# Patient Record
Sex: MALE | Race: WHITE | Employment: OTHER | URBAN - METROPOLITAN AREA
[De-identification: names, ages, dates, MRNs, and addresses within clinical notes are randomized per-mention and may not be internally consistent; named-entity substitution may affect disease eponyms.]

---

## 2018-01-15 PROBLEM — I47.9 PAROXYSMAL TACHYCARDIA (HCC): Status: ACTIVE | Noted: 2018-01-15

## 2018-01-15 PROBLEM — I10 ESSENTIAL HYPERTENSION WITH GOAL BLOOD PRESSURE LESS THAN 130/85: Status: ACTIVE | Noted: 2018-01-15

## 2018-01-15 PROBLEM — Z82.49 FAMILY HISTORY OF CORONARY ARTERIOSCLEROSIS: Status: ACTIVE | Noted: 2018-01-15

## 2018-01-15 PROBLEM — R00.2 PALPITATIONS: Status: ACTIVE | Noted: 2018-01-15

## 2018-01-15 PROBLEM — Z76.89 ENCOUNTER TO ESTABLISH CARE: Status: ACTIVE | Noted: 2018-01-15

## 2018-01-22 ENCOUNTER — HOSPITAL ENCOUNTER (OUTPATIENT)
Dept: LAB | Age: 63
Discharge: HOME | End: 2018-01-22
Payer: COMMERCIAL

## 2018-01-22 DIAGNOSIS — R00.2 PALPITATIONS: ICD-10-CM

## 2018-01-22 DIAGNOSIS — Z82.49 FAMILY HISTORY OF CORONARY ARTERIOSCLEROSIS: ICD-10-CM

## 2018-01-22 DIAGNOSIS — Z76.89 ENCOUNTER TO ESTABLISH CARE: ICD-10-CM

## 2018-01-22 DIAGNOSIS — I10 ESSENTIAL HYPERTENSION WITH GOAL BLOOD PRESSURE LESS THAN 130/85: ICD-10-CM

## 2018-01-22 DIAGNOSIS — I47.9 PAROXYSMAL TACHYCARDIA (HCC): ICD-10-CM

## 2018-01-22 LAB
ANION GAP SERPL CALC-SCNC: 6 MMOL/L
BUN SERPL-MCNC: 14 MG/DL (ref 8–23)
CALCIUM SERPL-MCNC: 8.8 MG/DL (ref 8.3–10.4)
CHLORIDE SERPL-SCNC: 104 MMOL/L (ref 98–107)
CHOLEST SERPL-MCNC: 299 MG/DL
CO2 SERPL-SCNC: 29 MMOL/L (ref 21–32)
CREAT SERPL-MCNC: 1 MG/DL (ref 0.8–1.5)
GLUCOSE SERPL-MCNC: 110 MG/DL (ref 65–100)
HDLC SERPL-MCNC: 44 MG/DL (ref 40–60)
HDLC SERPL: 6.8 {RATIO}
LDLC SERPL CALC-MCNC: 206.8 MG/DL
LIPID PROFILE,FLP: ABNORMAL
MAGNESIUM SERPL-MCNC: 1.8 MG/DL (ref 1.8–2.4)
POTASSIUM SERPL-SCNC: 3.8 MMOL/L (ref 3.5–5.1)
SODIUM SERPL-SCNC: 139 MMOL/L (ref 136–145)
TRIGL SERPL-MCNC: 241 MG/DL (ref 35–150)
TSH SERPL DL<=0.005 MIU/L-ACNC: 0.8 UIU/ML (ref 0.36–3.74)
VLDLC SERPL CALC-MCNC: 48.2 MG/DL (ref 6–23)

## 2018-01-22 PROCEDURE — 83735 ASSAY OF MAGNESIUM: CPT | Performed by: INTERNAL MEDICINE

## 2018-01-22 PROCEDURE — 36415 COLL VENOUS BLD VENIPUNCTURE: CPT | Performed by: INTERNAL MEDICINE

## 2018-01-22 PROCEDURE — 84443 ASSAY THYROID STIM HORMONE: CPT | Performed by: INTERNAL MEDICINE

## 2018-01-22 PROCEDURE — 80048 BASIC METABOLIC PNL TOTAL CA: CPT | Performed by: INTERNAL MEDICINE

## 2018-01-22 PROCEDURE — 80061 LIPID PANEL: CPT | Performed by: INTERNAL MEDICINE

## 2018-02-01 PROBLEM — E78.2 MIXED HYPERLIPIDEMIA: Status: ACTIVE | Noted: 2018-02-01

## 2018-02-01 PROBLEM — I49.1 PAC (PREMATURE ATRIAL CONTRACTION): Status: ACTIVE | Noted: 2018-02-01

## 2018-02-13 ENCOUNTER — HOSPITAL ENCOUNTER (OUTPATIENT)
Dept: PHYSICAL THERAPY | Age: 63
Discharge: HOME | End: 2018-02-13
Payer: COMMERCIAL

## 2018-02-13 PROCEDURE — 97161 PT EVAL LOW COMPLEX 20 MIN: CPT

## 2018-02-13 PROCEDURE — 97110 THERAPEUTIC EXERCISES: CPT

## 2018-02-13 NOTE — THERAPY EVALUATION
Mikhail Hylton  : 1955  Primary: [facility]  Secondary:  [facility] [place] [address], [place], [address]  [phone]   [phone]       OUTPATIENT PHYSICAL THERAPY:Initial Assessment 2018    ICD-10: Treatment Diagnosis: Pain in right shoulder (M25.511)  Treatment diagnosis 2: Impingement syndrome, right shoulder (M75.41)  Treatment diagnosis 3: Calcific tendinitis, right shoulder (M75.31)  Precautions: None  Allergies: Review of patient's allergies indicates no known allergies. Fall Risk Score: 1 (? 5 = High Risk)  MD Orders: evaluate and treat  MEDICAL/REFERRING DIAGNOSIS:  Impingement syndrome of right shoulder [M75.41]  Calcific tendinitis of right shoulder [M75.31]   DATE OF ONSET: Increasing pain for months  REFERRING PHYSICIAN: Carla Bess MD  RETURN PHYSICIAN APPOINTMENT: 2018     INITIAL ASSESSMENT:  Mr. Samuel Sanchez is a 57 y.o. male presenting to physical therapy with complaints of pain in right shoulder for the past few months. Patient is a full time  but has decreased work volume due to pain in right shoulder. Pain is increased with overhead work and minimal when upper extremity is below shoulder level. Pain is primarily in anterior shoulder and radiates to medial deltoid. Pain is occasionally in posterior shoulder muscles. Patient reports recent imaging that indicates calcific tendinitis in right shoulder. Patient reports no previous injuries or surgeries. Patient speaks very little [place] and requires a polish . Patient is a good candidate for skilled physical therapy services to include manual therapy, therapeutic exercise, and pain modalities as needed. PROBLEM LIST (Impacting functional limitations):  1. Decreased ADL/Functional Activities  2. Increased Pain  3. Decreased Activity Tolerance  4.  Decreased Flexibility/Joint Mobility INTERVENTIONS PLANNED:  1. Cold  2. Cryotherapy  3. Electrical Stimulation  4. Heat  5. Home Exercise Program (HEP)  6. Manual Therapy  7. Neuromuscular Re-education/Strengthening  8. Range of Motion (ROM)  9. Therapeutic Activites  10. Therapeutic Exercise/Strengthening  11. Transcutaneous Electrical Nerve Stimulation (TENS)  12. Ultrasound (US)   TREATMENT PLAN:  Effective Dates: 2/13/18 TO 3/13/18. Frequency/Duration: 2 times a week for 4 weeks  GOALS: (Goals have been discussed and agreed upon with patient.)  Short-Term Functional Goals: Time Frame: 2/13/18 to 2/27/18  1. Patient will be independent with home program to increase shoulder mobility. 2. Patient will report no more than 5/10 pain in right shoulder with all upper extremity work. Discharge Goals: Time Frame: 2/13/18 to 3/13/18  1. Patient will report no more than 2/10 pain in right shoulder with all overhead work and prolonged painting. 2. Patient will increase shoulder internal rotation bilaterally to 55 degrees to increase mobility. 3. Patient will increase right shoulder abduction to 170 degrees to increase functional mobility. 4. Patient will perform full active elevation of right shoulder/upper extremity with 0/10 pain. 5. Patient will improve Disability of the arm, shoulder, and hand score to 18/55 from 31/55. Rehabilitation Potential For Stated Goals: Good  Regarding [facility], I certify that the treatment plan above will be carried out by a therapist or under their direction. Thank you for this referral,  Justina Joseph PT     Referring Physician Signature: Liliana Granger MD              Date                    The information in this section was collected on 2/13/18 (except where otherwise noted). HISTORY:   History of Present Injury/Illness (Reason for Referral):  Mr. Zahra Real is a 57 y.o. male presenting to physical therapy with complaints of pain in right shoulder for the past few months.   Patient is a full time  but has decreased work volume due to pain in right shoulder. Pain is increased with overhead work and minimal when upper extremity is below shoulder level. Pain is primarily in anterior shoulder and radiates to medial deltoid. Pain is occasionally in posterior shoulder muscles. Patient reports recent imaging that indicates calcific tendinitis in right shoulder. Patient reports no previous injuries or surgeries. Patient speaks very little [place] and requires a polish . Past Medical History/Comorbidities:   Mr. He Ibarra  has no past medical history on file. Mr. He Ibarra  has no past surgical history on file. Social History/Living Environment:    Patient lives at home. Prior Level of Function/Work/Activity:  Full time , self employed. Dominant Side:         RIGHT  Current Medications:       Current Outpatient Prescriptions:     amLODIPine-benazepril (LOTREL) 2.5-10 mg per capsule, Take 1 Cap by mouth daily. , Disp: 90 Cap, Rfl: 3   Date Last Reviewed:  2/13/2018   Number of Personal Factors/Comorbidities that affect the Plan of Care:  (None) 0: LOW COMPLEXITY   EXAMINATION:   Observation/Orthostatic Postural Assessment:          Patient presents with mild rounded shoulders posture. Palpation:          Tender at anterior shoulder (biceps, long head tendon at bicipital groove)  ROM:            Shoulder AROM: (degrees)  Flexion: left=180, right=165 with pain  Abduction: left=180, right=150 with pain  External rotation: left=85, right=80  Internal rotation: left=40, right=40    Strength:            Gross strength in bilateral shoulders and upper extremities is 5/5. Pain only with resisted shoulder flexion (right side).    : left=65 lbs, right=63 lbs    Special Tests:            Giron Syed=negative  Speed's test=positive  Empty can=negative  Hornblower's=negative  Painful arc=positive    Neurological Screen:        Myotomes:  Bilateral upper extremities are normal Dermatomes:  Intact for light touch and sensation    Functional Mobility:           Standing shoulder flexion: left=175, right=175 with pain  Standing shoulder abduction: gnxs=793, right=175 with pain     Body Structures Involved:  1. Bones  2. Joints  3. Muscles Body Functions Affected:  1. Sensory/Pain  2. Movement Related Activities and Participation Affected:  1. General Tasks and Demands  2. Mobility  3. Domestic Life  4. Community, Social and [place]   Number of elements (examined above) that affect the Plan of Care: 1-2: LOW COMPLEXITY   CLINICAL PRESENTATION:   Presentation: Stable and uncomplicated: LOW COMPLEXITY   CLINICAL DECISION MAKING:   Outcome Measure: Tool Used: Disabilities of the Arm, Shoulder and Hand (DASH) Questionnaire - Quick Version  Score:  Initial: 31/55  Most Recent: X/55 (Date: -- )   Interpretation of Score: The DASH is designed to measure the activities of daily living in person's with upper extremity dysfunction or pain. Each section is scored on a 1-5 scale, 5 representing the greatest disability. The scores of each section are added together for a total score of 55. Score 11 12-19 20-28 29-37 38-45 46-54 55   Modifier CH CI CJ CK CL CM CN       Medical Necessity:   · Patient is expected to demonstrate progress in strength, range of motion, coordination and functional technique to decrease pain and increase tolerance for overhead activities and work duties. · Skilled intervention continues to be required due to pain and limitation in right shoulder. Reason for Services/Other Comments:  · Patient continues to require skilled intervention due to pain and limitation in right shoulder.    Use of outcome tool(s) and clinical judgement create a POC that gives a:  (no co-morbidities, moderate pain, moderate limitation on outcome measure) Clear prediction of patient's progress: LOW COMPLEXITY            TREATMENT:   (In addition to Assessment/Re-Assessment sessions the following treatments were rendered)  Pre-treatment Symptoms/Complaints:  Patient reports increased pain with overhead work (painting) and minimal pain when below shoulder level. Pain: Initial:   Pain Intensity 1: 4  Pain Location 1: Shoulder  Pain Orientation 1: Right  Post Session:  Patient reports 2/10 pain     Therapeutic Exercise: (25 Minutes):  Exercises per grid below to improve mobility, strength, coordination and dynamic movement of shoulder - right to improve functional lifting, carrying, reaching and overhead activites. Required minimal visual, verbal and manual cues to promote proper body alignment, promote proper body posture and promote proper body mechanics. Progressed resistance, range, repetitions and complexity of movement as indicated. Date:  2/13/18 Date:   Date:     Activity/Exercise Parameters Parameters Parameters   Wand flexion [id number], supine     Wand abduction [id number], standing     Functional IR stretch [id number], right, with towel     Prone T 1x10, 5 sec holds     Prone shoulder extension 1x10, 5 sec holds                   Manual Therapy (     ): None today    Therapeutic Modalities: for pain and edema                  Right Shoulder Cold  Type: Cold/ice pack  Duration : 10 minutes  Patient Position: Sitting                                                                            HEP: As above; handouts given to patient for all exercises. Treatment/Session Assessment:    · Response to Treatment:  Patient reported decreased pain following cold treatment and reported no issues with stretches/exercises. Patient showed good understanding of home program.  Patient advised to limit overhead work and heavy lifting, and ice shoulder 2 times per day. · Compliance with Program/Exercises: compliant most of the time. · Recommendations/Intent for next treatment session: \"Next visit will focus on advancements to more challenging activities\".     Total Treatment Duration: 55 minutes; 20 minute evaluation, 25 minutes therapeutic exercise, 10 minute cold pack    PT Patient Time In/Time Out  Time In: 0905  Time Out: [address], PT

## 2018-02-13 NOTE — PROGRESS NOTES
Ambulatory/Rehab Services H2 Model Falls Risk Assessment    Risk Factor Pts. ·   Confusion/Disorientation/Impulsivity  []    4 ·   Symptomatic Depression  []   2 ·   Altered Elimination  []   1 ·   Dizziness/Vertigo  []   1 ·   Gender (Male)  [x]   1 ·   Any administered antiepileptics (anticonvulsants):  []   2 ·   Any administered benzodiazepines:  []   1 ·   Visual Impairment (specify):  []   1 ·   Portable Oxygen Use  []   1 ·   Orthostatic ? BP  []   1 ·   History of Recent Falls (within 3 mos.)  []   5     Ability to Rise from Chair (choose one) Pts. ·   Ability to rise in a single movement  [x]   0 ·   Pushes up, successful in one attempt  []   1 ·   Multiple attempts, but successful  []   3 ·   Unable to rise without assistance  []   4   Total: (5 or greater = High Risk) 1     Falls Prevention Plan:   []                Physical Limitations to Exercise (specify):   []                Mobility Assistance Device (type):   []                Exercise/Equipment Adaptation (specify):    ©2010 [facility] [address] Patent #[id number].  Federal Law prohibits the replication, distribution or use without written permission from [facility] [employer]

## 2018-02-20 ENCOUNTER — HOSPITAL ENCOUNTER (OUTPATIENT)
Dept: PHYSICAL THERAPY | Age: 63
Discharge: HOME | End: 2018-02-20
Payer: COMMERCIAL

## 2018-02-20 PROCEDURE — 97110 THERAPEUTIC EXERCISES: CPT

## 2018-02-20 PROCEDURE — 97035 APP MDLTY 1+ULTRASOUND EA 15: CPT

## 2018-02-20 NOTE — PROGRESS NOTES
Taylorjuana Wyatt  : 1955  Primary: [place] [facility]  Secondary:  [facility] [place] [address], [place], [address]  [phone]   [phone]       OUTPATIENT PHYSICAL THERAPY:Daily Note 2018    ICD-10: Treatment Diagnosis: Pain in right shoulder (M25.511)  Treatment diagnosis 2: Impingement syndrome, right shoulder (M75.41)  Treatment diagnosis 3: Calcific tendinitis, right shoulder (M75.31)  Precautions: None  Allergies: Review of patient's allergies indicates no known allergies. Fall Risk Score: 1 (? 5 = High Risk)  MD Orders: evaluate and treat  MEDICAL/REFERRING DIAGNOSIS:  Impingement syndrome of right shoulder [M75.41]  Calcific tendinitis of right shoulder [M75.31]   DATE OF ONSET: Increasing pain for months  REFERRING PHYSICIAN: Shasha Hooper MD  RETURN PHYSICIAN APPOINTMENT: 2018     INITIAL ASSESSMENT:  Mr. Benoit Metzger is a 57 y.o. male presenting to physical therapy with complaints of pain in right shoulder for the past few months. Patient is a full time  but has decreased work volume due to pain in right shoulder. Pain is increased with overhead work and minimal when upper extremity is below shoulder level. Pain is primarily in anterior shoulder and radiates to medial deltoid. Pain is occasionally in posterior shoulder muscles. Patient reports recent imaging that indicates calcific tendinitis in right shoulder. Patient reports no previous injuries or surgeries. Patient speaks very little [place] and requires a polish . Patient is a good candidate for skilled physical therapy services to include manual therapy, therapeutic exercise, and pain modalities as needed. PROBLEM LIST (Impacting functional limitations):  1. Decreased ADL/Functional Activities  2. Increased Pain  3. Decreased Activity Tolerance  4.  Decreased Flexibility/Joint Mobility INTERVENTIONS PLANNED:  1. Cold  2. Cryotherapy  3. Electrical Stimulation  4. Heat  5. Home Exercise Program (HEP)  6. Manual Therapy  7. Neuromuscular Re-education/Strengthening  8. Range of Motion (ROM)  9. Therapeutic Activites  10. Therapeutic Exercise/Strengthening  11. Transcutaneous Electrical Nerve Stimulation (TENS)  12. Ultrasound (US)   TREATMENT PLAN:  Effective Dates: 2/13/18 TO 3/13/18. Frequency/Duration: 2 times a week for 4 weeks  GOALS: (Goals have been discussed and agreed upon with patient.)  Short-Term Functional Goals: Time Frame: 2/13/18 to 2/27/18  1. Patient will be independent with home program to increase shoulder mobility. 2. Patient will report no more than 5/10 pain in right shoulder with all upper extremity work. Discharge Goals: Time Frame: 2/13/18 to 3/13/18  1. Patient will report no more than 2/10 pain in right shoulder with all overhead work and prolonged painting. 2. Patient will increase shoulder internal rotation bilaterally to 55 degrees to increase mobility. 3. Patient will increase right shoulder abduction to 170 degrees to increase functional mobility. 4. Patient will perform full active elevation of right shoulder/upper extremity with 0/10 pain. 5. Patient will improve Disability of the arm, shoulder, and hand score to 18/55 from 31/55. Rehabilitation Potential For Stated Goals: Good  [facility] certify that the treatment plan above will be carried out by a therapist or under their direction. Thank you for this referral,  Haven Buck PT     Referring Physician Signature: Donnie Vallejo MD              Date                    The information in this section was collected on 2/13/18 (except where otherwise noted). HISTORY:   History of Present Injury/Illness (Reason for Referral):  Mr. He Ibarra is a 57 y.o. male presenting to physical therapy with complaints of pain in right shoulder for the past few months.   Patient is a full time  but has decreased work volume due to pain in right shoulder. Pain is increased with overhead work and minimal when upper extremity is below shoulder level. Pain is primarily in anterior shoulder and radiates to medial deltoid. Pain is occasionally in posterior shoulder muscles. Patient reports recent imaging that indicates calcific tendinitis in right shoulder. Patient reports no previous injuries or surgeries. Patient speaks very little [place] and requires a polish . Past Medical History/Comorbidities:   Mr. Terell Hernandez  has no past medical history on file. Mr. Terell Hernandez  has no past surgical history on file. Social History/Living Environment:    Patient lives at home. Prior Level of Function/Work/Activity:  Full time , self employed. Dominant Side:         RIGHT  Current Medications:       Current Outpatient Prescriptions:     amLODIPine-benazepril (LOTREL) 2.5-10 mg per capsule, Take 1 Cap by mouth daily. , Disp: 90 Cap, Rfl: 3   Date Last Reviewed:  2/20/2018   Number of Personal Factors/Comorbidities that affect the Plan of Care:  (None) 0: LOW COMPLEXITY   EXAMINATION:   Observation/Orthostatic Postural Assessment:          Patient presents with mild rounded shoulders posture. Palpation:          Tender at anterior shoulder (biceps, long head tendon at bicipital groove)  ROM:            Shoulder AROM: (degrees)  Flexion: left=180, right=165 with pain  Abduction: left=180, right=150 with pain  External rotation: left=85, right=80  Internal rotation: left=40, right=40    Strength:            Gross strength in bilateral shoulders and upper extremities is 5/5. Pain only with resisted shoulder flexion (right side).    : left=65 lbs, right=63 lbs    Special Tests:            Giron Syed=negative  Speed's test=positive  Empty can=negative  Hornblower's=negative  Painful arc=positive    Neurological Screen:        Myotomes:  Bilateral upper extremities are normal Dermatomes:  Intact for light touch and sensation    Functional Mobility:           Standing shoulder flexion: left=175, right=175 with pain  Standing shoulder abduction: gqvy=706, right=175 with pain     Body Structures Involved:  1. Bones  2. Joints  3. Muscles Body Functions Affected:  1. Sensory/Pain  2. Movement Related Activities and Participation Affected:  1. General Tasks and Demands  2. Mobility  3. Domestic Life  4. Community, Social and [place]   Number of elements (examined above) that affect the Plan of Care: 1-2: LOW COMPLEXITY   CLINICAL PRESENTATION:   Presentation: Stable and uncomplicated: LOW COMPLEXITY   CLINICAL DECISION MAKING:   Outcome Measure: Tool Used: Disabilities of the Arm, Shoulder and Hand (DASH) Questionnaire - Quick Version  Score:  Initial: 31/55  Most Recent: X/55 (Date: -- )   Interpretation of Score: The DASH is designed to measure the activities of daily living in person's with upper extremity dysfunction or pain. Each section is scored on a 1-5 scale, 5 representing the greatest disability. The scores of each section are added together for a total score of 55. Score 11 12-19 20-28 29-37 38-45 46-54 55   Modifier CH CI CJ CK CL CM CN       Medical Necessity:   · Patient is expected to demonstrate progress in strength, range of motion, coordination and functional technique to decrease pain and increase tolerance for overhead activities and work duties. · Skilled intervention continues to be required due to pain and limitation in right shoulder. Reason for Services/Other Comments:  · Patient continues to require skilled intervention due to pain and limitation in right shoulder.    Use of outcome tool(s) and clinical judgement create a POC that gives a:  (no co-morbidities, moderate pain, moderate limitation on outcome measure) Clear prediction of patient's progress: LOW COMPLEXITY            TREATMENT:   (In addition to Assessment/Re-Assessment sessions the following treatments were rendered)  Pre-treatment Symptoms/Complaints:  Patient reports moderate shoulder pain today and reports no issues with home program.    Pain: Initial:   Pain Intensity 1: 4  Pain Location 1: Shoulder  Pain Orientation 1: Right, Anterior  Post Session:  Patient reports 2/10 pain     Therapeutic Exercise: (35 Minutes):  Exercises per grid below to improve mobility, strength, coordination and dynamic movement of shoulder - right to improve functional lifting, carrying, reaching and overhead activites. Required minimal visual, verbal and manual cues to promote proper body alignment, promote proper body posture and promote proper body mechanics. Progressed resistance, range, repetitions and complexity of movement as indicated. Date:  2/13/18 Date:  2/20/18 Date:     Activity/Exercise Parameters Parameters Parameters   Wand flexion [id number], supine     Wand abduction [id number], standing     Functional IR stretch [id number], right, with towel     Prone T 1x10, 5 sec holds     Low rows  2x10, green    Cable rows  2x10, 20 lbs    Bilateral ER  2x10, red    Bicep curls  2x10, eccentric only, 8 lbs    Tband punch  2x10, blue    Prone shoulder extension 1x10, 5 sec holds     Doorway stretch, low  [id number]            Manual Therapy (     ): None today    Therapeutic Modalities: for pain and edema                  Right Shoulder Cold  Type: Cold/ice pack  Duration : 10 minutes  Patient Position: Sitting                                            Right Shoulder Ultrasound  Duty Cycle: 50 %  Frequency: 1 mHz  Intensity: 1.5 duran/cm sq  Duration : 10 minutes  Patient Position: Sitting                               HEP: As above; handouts given to patient for all exercises. Treatment/Session Assessment:    · Response to Treatment:  Patient reported no issues or pain with exercises and stretches. Patient reported mild relief following session.   · Compliance with Program/Exercises: compliant most of the time.  · Recommendations/Intent for next treatment session: \"Next visit will focus on advancements to more challenging activities\".     Total Treatment Duration: 55 minutes    PT Patient Time In/Time Out  Time In: 0800  Time Out: Pr-[address] [place] By Caden Richmond PT

## 2018-02-22 ENCOUNTER — HOSPITAL ENCOUNTER (OUTPATIENT)
Dept: PHYSICAL THERAPY | Age: 63
Discharge: HOME | End: 2018-02-22
Payer: COMMERCIAL

## 2018-02-22 PROCEDURE — 97110 THERAPEUTIC EXERCISES: CPT

## 2018-02-22 PROCEDURE — 97140 MANUAL THERAPY 1/> REGIONS: CPT

## 2018-02-22 PROCEDURE — 97035 APP MDLTY 1+ULTRASOUND EA 15: CPT

## 2018-02-22 NOTE — PROGRESS NOTES
Natalya Davey  : 1955  Primary: [facility]  Secondary:  [facility] [place] [facility] [address], [place], [address]  [phone]   [phone]       OUTPATIENT PHYSICAL THERAPY:Daily Note 2018    ICD-10: Treatment Diagnosis: Pain in right shoulder (M25.511)  Treatment diagnosis 2: Impingement syndrome, right shoulder (M75.41)  Treatment diagnosis 3: Calcific tendinitis, right shoulder (M75.31)  Precautions: None  Allergies: Review of patient's allergies indicates no known allergies. Fall Risk Score: 1 (? 5 = High Risk)  MD Orders: evaluate and treat  MEDICAL/REFERRING DIAGNOSIS:  Impingement syndrome of right shoulder [M75.41]  Calcific tendinitis of right shoulder [M75.31]   DATE OF ONSET: Increasing pain for months  REFERRING PHYSICIAN: Lesli Brady MD  RETURN PHYSICIAN APPOINTMENT: 2018     INITIAL ASSESSMENT:  Mr. Sofya Hill is a 57 y.o. male presenting to physical therapy with complaints of pain in right shoulder for the past few months. Patient is a full time  but has decreased work volume due to pain in right shoulder. Pain is increased with overhead work and minimal when upper extremity is below shoulder level. Pain is primarily in anterior shoulder and radiates to medial deltoid. Pain is occasionally in posterior shoulder muscles. Patient reports recent imaging that indicates calcific tendinitis in right shoulder. Patient reports no previous injuries or surgeries. Patient speaks very little [place] and requires a polish . Patient is a good candidate for skilled physical therapy services to include manual therapy, therapeutic exercise, and pain modalities as needed. PROBLEM LIST (Impacting functional limitations):  1. Decreased ADL/Functional Activities  2. Increased Pain  3. Decreased Activity Tolerance  4.  Decreased Flexibility/Joint Mobility INTERVENTIONS PLANNED:  1. Cold  2. Cryotherapy  3. Electrical Stimulation  4. Heat  5. Home Exercise Program (HEP)  6. Manual Therapy  7. Neuromuscular Re-education/Strengthening  8. Range of Motion (ROM)  9. Therapeutic Activites  10. Therapeutic Exercise/Strengthening  11. Transcutaneous Electrical Nerve Stimulation (TENS)  12. Ultrasound (US)   TREATMENT PLAN:  Effective Dates: 2/13/18 TO 3/13/18. Frequency/Duration: 2 times a week for 4 weeks  GOALS: (Goals have been discussed and agreed upon with patient.)  Short-Term Functional Goals: Time Frame: 2/13/18 to 2/27/18  1. Patient will be independent with home program to increase shoulder mobility. 2. Patient will report no more than 5/10 pain in right shoulder with all upper extremity work. Discharge Goals: Time Frame: 2/13/18 to 3/13/18  1. Patient will report no more than 2/10 pain in right shoulder with all overhead work and prolonged painting. 2. Patient will increase shoulder internal rotation bilaterally to 55 degrees to increase mobility. 3. Patient will increase right shoulder abduction to 170 degrees to increase functional mobility. 4. Patient will perform full active elevation of right shoulder/upper extremity with 0/10 pain. 5. Patient will improve Disability of the arm, shoulder, and hand score to 18/55 from 31/55. Rehabilitation Potential For Stated Goals: Good  Regarding [facility], I certify that the treatment plan above will be carried out by a therapist or under their direction. Thank you for this referral,  Haven Buck PT     Referring Physician Signature: Donnie Vallejo MD              Date                    The information in this section was collected on 2/13/18 (except where otherwise noted). HISTORY:   History of Present Injury/Illness (Reason for Referral):  Mr. He Ibarra is a 57 y.o. male presenting to physical therapy with complaints of pain in right shoulder for the past few months.   Patient is a full time  but has decreased work volume due to pain in right shoulder. Pain is increased with overhead work and minimal when upper extremity is below shoulder level. Pain is primarily in anterior shoulder and radiates to medial deltoid. Pain is occasionally in posterior shoulder muscles. Patient reports recent imaging that indicates calcific tendinitis in right shoulder. Patient reports no previous injuries or surgeries. Patient speaks very little [place] and requires a polish . Past Medical History/Comorbidities:   Mr. Te Hummel  has no past medical history on file. Mr. Te Hummel  has no past surgical history on file. Social History/Living Environment:    Patient lives at home. Prior Level of Function/Work/Activity:  Full time , self employed. Dominant Side:         RIGHT  Current Medications:       Current Outpatient Prescriptions:     amLODIPine-benazepril (LOTREL) 2.5-10 mg per capsule, Take 1 Cap by mouth daily. , Disp: 90 Cap, Rfl: 3   Date Last Reviewed:  2/22/2018   Number of Personal Factors/Comorbidities that affect the Plan of Care:  (None) 0: LOW COMPLEXITY   EXAMINATION:   Observation/Orthostatic Postural Assessment:          Patient presents with mild rounded shoulders posture. Palpation:          Tender at anterior shoulder (biceps, long head tendon at bicipital groove)  ROM:            Shoulder AROM: (degrees)  Flexion: left=180, right=165 with pain  Abduction: left=180, right=150 with pain  External rotation: left=85, right=80  Internal rotation: left=40, right=40    Strength:            Gross strength in bilateral shoulders and upper extremities is 5/5. Pain only with resisted shoulder flexion (right side).    : left=65 lbs, right=63 lbs    Special Tests:            Giron Syed=negative  Speed's test=positive  Empty can=negative  Hornblower's=negative  Painful arc=positive    Neurological Screen:        Myotomes:  Bilateral upper extremities are normal Dermatomes:  Intact for light touch and sensation    Functional Mobility:           Standing shoulder flexion: left=175, right=175 with pain  Standing shoulder abduction: vpac=395, right=175 with pain     Body Structures Involved:  1. Bones  2. Joints  3. Muscles Body Functions Affected:  1. Sensory/Pain  2. Movement Related Activities and Participation Affected:  1. General Tasks and Demands  2. Mobility  3. Domestic Life  4. Community, Social and [place]   Number of elements (examined above) that affect the Plan of Care: 1-2: LOW COMPLEXITY   CLINICAL PRESENTATION:   Presentation: Stable and uncomplicated: LOW COMPLEXITY   CLINICAL DECISION MAKING:   Outcome Measure: Tool Used: Disabilities of the Arm, Shoulder and Hand (DASH) Questionnaire - Quick Version  Score:  Initial: 31/55  Most Recent: X/55 (Date: -- )   Interpretation of Score: The DASH is designed to measure the activities of daily living in person's with upper extremity dysfunction or pain. Each section is scored on a 1-5 scale, 5 representing the greatest disability. The scores of each section are added together for a total score of 55. Score 11 12-19 20-28 29-37 38-45 46-54 55   Modifier CH CI CJ CK CL CM CN       Medical Necessity:   · Patient is expected to demonstrate progress in strength, range of motion, coordination and functional technique to decrease pain and increase tolerance for overhead activities and work duties. · Skilled intervention continues to be required due to pain and limitation in right shoulder. Reason for Services/Other Comments:  · Patient continues to require skilled intervention due to pain and limitation in right shoulder.    Use of outcome tool(s) and clinical judgement create a POC that gives a:  (no co-morbidities, moderate pain, moderate limitation on outcome measure) Clear prediction of patient's progress: LOW COMPLEXITY            TREATMENT:   (In addition to Assessment/Re-Assessment sessions the following treatments were rendered)  Pre-treatment Symptoms/Complaints:  Patient reports mild pain today only with movement. Patient reports slight improvement since starting therapy. Pain: Initial:   Pain Intensity 1: 4  Pain Location 1: Shoulder  Pain Orientation 1: Right, Anterior  Post Session:  Patient reports 3/10 pain     Therapeutic Exercise: (35 Minutes):  Exercises per grid below to improve mobility, strength, coordination and dynamic movement of shoulder - right to improve functional lifting, carrying, reaching and overhead activites. Required minimal visual, verbal and manual cues to promote proper body alignment, promote proper body posture and promote proper body mechanics. Progressed resistance, range, repetitions and complexity of movement as indicated. Date:  2/13/18 Date:  2/20/18 Date:  2/22/18   Activity/Exercise Parameters Parameters Parameters   Wand flexion [id number], supine     Wand abduction [id number], standing     Push up plus   1x10, against table   Functional IR stretch [id number], right, with towel     Sidelying ER   1x15, 3 lbs   Prone Y   2x10, 3 lbs   Prone T 1x10, 5 sec holds  2x10, 3 lbs   Low rows  2x10, green    Cable rows  2x10, 20 lbs 2x12, B, 22.5lbs   Bilateral ER  2x10, red    Bicep curls  2x10, eccentric only, 8 lbs 2x10, 10 lbs, eccentric only   Cable punch  2x10, blue 2x10, 10 lbs, R   Prone shoulder extension 1x10, 5 sec holds  2x10, 3 lb   Doorway stretch, low  [id number]            Manual Therapy (    Soft Tissue Mobilization Duration  Duration: 10 Minutes):  Inferior and posterior glides of glenohumeral joint in supine with gentle oscillation and distraction    Therapeutic Modalities: for pain and edema                                                               Right Shoulder Ultrasound  Duty Cycle: 100 %  Frequency: 1 mHz  Intensity: 0.5 duran/cm sq  Duration : 10 minutes  Patient Position: Sitting                               HEP: As above; handouts given to patient for all exercises. Treatment/Session Assessment:    · Response to Treatment:  Patient reported no issues or pain with most exercises and stretches. Patient reports slight pain with prone shoulder Y exercise  · Compliance with Program/Exercises: compliant most of the time. · Recommendations/Intent for next treatment session: \"Next visit will focus on advancements to more challenging activities\".     Total Treatment Duration: 55 minutes    PT Patient Time In/Time Out  Time In: 1230  Time Out: 238 Maria Eugenia Casas, PT

## 2018-02-27 ENCOUNTER — HOSPITAL ENCOUNTER (OUTPATIENT)
Dept: PHYSICAL THERAPY | Age: 63
Discharge: HOME | End: 2018-02-27
Payer: COMMERCIAL

## 2018-02-27 PROCEDURE — 97035 APP MDLTY 1+ULTRASOUND EA 15: CPT

## 2018-02-27 PROCEDURE — 97110 THERAPEUTIC EXERCISES: CPT

## 2018-02-27 PROCEDURE — 97140 MANUAL THERAPY 1/> REGIONS: CPT

## 2018-02-27 NOTE — PROGRESS NOTES
Char Burgess  : 1955  Primary: [facility]  Secondary:  [facility] [place] [address], [place], [address] [place] [address] Street  [phone]   [phone]       OUTPATIENT PHYSICAL THERAPY:Daily Note 2018    ICD-10: Treatment Diagnosis: Pain in right shoulder (M25.511)  Treatment diagnosis 2: Impingement syndrome, right shoulder (M75.41)  Treatment diagnosis 3: Calcific tendinitis, right shoulder (M75.31)  Precautions: None  Allergies: Review of patient's allergies indicates no known allergies. Fall Risk Score: 1 (? 5 = High Risk)  MD Orders: evaluate and treat  MEDICAL/REFERRING DIAGNOSIS:  Impingement syndrome of right shoulder [M75.41]  Calcific tendinitis of right shoulder [M75.31]   DATE OF ONSET: Increasing pain for months  REFERRING PHYSICIAN: Niko Gomez MD  RETURN PHYSICIAN APPOINTMENT: 2018     INITIAL ASSESSMENT:  Mr. Freida Alvarez is a 57 y.o. male presenting to physical therapy with complaints of pain in right shoulder for the past few months. Patient is a full time  but has decreased work volume due to pain in right shoulder. Pain is increased with overhead work and minimal when upper extremity is below shoulder level. Pain is primarily in anterior shoulder and radiates to medial deltoid. Pain is occasionally in posterior shoulder muscles. Patient reports recent imaging that indicates calcific tendinitis in right shoulder. Patient reports no previous injuries or surgeries. Patient speaks very little [place] and requires a polish . Patient is a good candidate for skilled physical therapy services to include manual therapy, therapeutic exercise, and pain modalities as needed. PROBLEM LIST (Impacting functional limitations):  1. Decreased ADL/Functional Activities  2. Increased Pain  3. Decreased Activity Tolerance  4.  Decreased Flexibility/Joint Mobility INTERVENTIONS PLANNED:  1. Cold  2. Cryotherapy  3. Electrical Stimulation  4. Heat  5. Home Exercise Program (HEP)  6. Manual Therapy  7. Neuromuscular Re-education/Strengthening  8. Range of Motion (ROM)  9. Therapeutic Activites  10. Therapeutic Exercise/Strengthening  11. Transcutaneous Electrical Nerve Stimulation (TENS)  12. Ultrasound (US)   TREATMENT PLAN:  Effective Dates: 2/13/18 TO 3/13/18. Frequency/Duration: 2 times a week for 4 weeks  GOALS: (Goals have been discussed and agreed upon with patient.)  Short-Term Functional Goals: Time Frame: 2/13/18 to 2/27/18  1. Patient will be independent with home program to increase shoulder mobility. 2. Patient will report no more than 5/10 pain in right shoulder with all upper extremity work. Discharge Goals: Time Frame: 2/13/18 to 3/13/18  1. Patient will report no more than 2/10 pain in right shoulder with all overhead work and prolonged painting. 2. Patient will increase shoulder internal rotation bilaterally to 55 degrees to increase mobility. 3. Patient will increase right shoulder abduction to 170 degrees to increase functional mobility. 4. Patient will perform full active elevation of right shoulder/upper extremity with 0/10 pain. 5. Patient will improve Disability of the arm, shoulder, and hand score to 18/55 from 31/55. Rehabilitation Potential For Stated Goals: Good  Regarding \[facility]\"" [employer], I certify that the treatment plan above will be carried out by a therapist or under their direction. Thank you for this referral,  Piper De Leon PT     Referring Physician Signature: Nemesio Robertson MD              Date                    The information in this section was collected on 2/13/18 (except where otherwise noted). HISTORY:   History of Present Injury/Illness (Reason for Referral):  Mr. Parvez Bedolla is a 57 y.o. male presenting to physical therapy with complaints of pain in right shoulder for the past few months.   Patient is a full time  but has decreased work volume due to pain in right shoulder. Pain is increased with overhead work and minimal when upper extremity is below shoulder level. Pain is primarily in anterior shoulder and radiates to medial deltoid. Pain is occasionally in posterior shoulder muscles. Patient reports recent imaging that indicates calcific tendinitis in right shoulder. Patient reports no previous injuries or surgeries. Patient speaks very little [place] and requires a polish . Past Medical History/Comorbidities:   Mr. Te Hummel  has no past medical history on file. Mr. Te Hummel  has no past surgical history on file. Social History/Living Environment:    Patient lives at home. Prior Level of Function/Work/Activity:  Full time , self employed. Dominant Side:         RIGHT  Current Medications:       Current Outpatient Prescriptions:     amLODIPine-benazepril (LOTREL) 2.5-10 mg per capsule, Take 1 Cap by mouth daily. , Disp: 90 Cap, Rfl: 3   Date Last Reviewed:  2/27/2018   Number of Personal Factors/Comorbidities that affect the Plan of Care:  (None) 0: LOW COMPLEXITY   EXAMINATION:   Observation/Orthostatic Postural Assessment:          Patient presents with mild rounded shoulders posture. Palpation:          Tender at anterior shoulder (biceps, long head tendon at bicipital groove)  ROM:            Shoulder AROM: (degrees)  Flexion: left=180, right=165 with pain  Abduction: left=180, right=150 with pain  External rotation: left=85, right=80  Internal rotation: left=40, right=40    Strength:            Gross strength in bilateral shoulders and upper extremities is 5/5. Pain only with resisted shoulder flexion (right side).    : left=65 lbs, right=63 lbs    Special Tests:            Giron Syed=negative  Speed's test=positive  Empty can=negative  Hornblower's=negative  Painful arc=positive    Neurological Screen:        Myotomes:  Bilateral upper extremities are normal Dermatomes:  Intact for light touch and sensation    Functional Mobility:           Standing shoulder flexion: left=175, right=175 with pain  Standing shoulder abduction: wctp=770, right=175 with pain     Body Structures Involved:  1. Bones  2. Joints  3. Muscles Body Functions Affected:  1. Sensory/Pain  2. Movement Related Activities and Participation Affected:  1. General Tasks and Demands  2. Mobility  3. Domestic Life  4. Community, Social and [place]   Number of elements (examined above) that affect the Plan of Care: 1-2: LOW COMPLEXITY   CLINICAL PRESENTATION:   Presentation: Stable and uncomplicated: LOW COMPLEXITY   CLINICAL DECISION MAKING:   Outcome Measure: Tool Used: Disabilities of the Arm, Shoulder and Hand (DASH) Questionnaire - Quick Version  Score:  Initial: 31/55  Most Recent: X/55 (Date: -- )   Interpretation of Score: The DASH is designed to measure the activities of daily living in person's with upper extremity dysfunction or pain. Each section is scored on a 1-5 scale, 5 representing the greatest disability. The scores of each section are added together for a total score of 55. Score 11 12-19 20-28 29-37 38-45 46-54 55   Modifier CH CI CJ CK CL CM CN       Medical Necessity:   · Patient is expected to demonstrate progress in strength, range of motion, coordination and functional technique to decrease pain and increase tolerance for overhead activities and work duties. · Skilled intervention continues to be required due to pain and limitation in right shoulder. Reason for Services/Other Comments:  · Patient continues to require skilled intervention due to pain and limitation in right shoulder.    Use of outcome tool(s) and clinical judgement create a POC that gives a:  (no co-morbidities, moderate pain, moderate limitation on outcome measure) Clear prediction of patient's progress: LOW COMPLEXITY            TREATMENT:   (In addition to Assessment/Re-Assessment sessions the following treatments were rendered)  Pre-treatment Symptoms/Complaints:  Patient continues to report mild pain in right shoulder and reports no significant change in symptoms since last session. Pain: Initial:   Pain Intensity 1: 4  Pain Location 1: Shoulder  Pain Orientation 1: Right, Anterior  Post Session:  Patient reports 2/10 pain     Therapeutic Exercise: (35 Minutes):  Exercises per grid below to improve mobility, strength, coordination and dynamic movement of shoulder - right to improve functional lifting, carrying, reaching and overhead activites. Required minimal visual, verbal and manual cues to promote proper body alignment, promote proper body posture and promote proper body mechanics. Progressed resistance, range, repetitions and complexity of movement as indicated. Date:  2/27/18 Date:  2/20/18 Date:  2/22/18   Activity/Exercise Parameters Parameters Parameters   Wand flexion      Wand abduction      Push up plus 2x10, against table  1x10, against table   Functional IR stretch      External rotation  2x10, blue, right  1x15, 3 lbs   Prone Y   2x10, 3 lbs   Prone T Standing Tband, 1x10, green  2x10, 3 lbs   Low rows  2x10, green    Cable rows 3x10, B, 25lbs 2x10, 20 lbs 2x12, B, 22.5lbs   Bilateral ER  2x10, red    Bicep curls 2x10, 10 lbs, eccentric focus 2x10, eccentric only, 8 lbs 2x10, 10 lbs, eccentric only   Cable punch 2x10, 12.5 lbs, B 2x10, blue 2x10, 10 lbs, R   Prone shoulder extension   2x10, 3 lb   Doorway stretch, low  [id number]            Manual Therapy (    Soft Tissue Mobilization Duration  Duration: 10 Minutes):  Inferior and posterior glides of glenohumeral joint in supine with gentle oscillation and distraction    Therapeutic Modalities: for pain and edema                                                               Right Shoulder Ultrasound  Duty Cycle: 100 %  Frequency: 1 mHz  Intensity: 0.5 duran/cm sq  Duration : 10 minutes  Patient Position: Sitting HEP: As above; handouts given to patient for all exercises. Treatment/Session Assessment:    · Response to Treatment:  Patient reported no issues or pain with exercises today. Patient reported minimal pain post session, primarily fatigue. · Compliance with Program/Exercises: compliant most of the time. · Recommendations/Intent for next treatment session: \"Next visit will focus on advancements to more challenging activities\".     Total Treatment Duration: 55 minutes    PT Patient Time In/Time Out  Time In: 0805  Time Out: 0900    Karis Carlson PT

## 2018-03-02 ENCOUNTER — HOSPITAL ENCOUNTER (OUTPATIENT)
Dept: PHYSICAL THERAPY | Age: 63
Discharge: HOME | End: 2018-03-02
Payer: COMMERCIAL

## 2018-03-02 PROCEDURE — 97140 MANUAL THERAPY 1/> REGIONS: CPT

## 2018-03-02 PROCEDURE — 97035 APP MDLTY 1+ULTRASOUND EA 15: CPT

## 2018-03-02 PROCEDURE — 97110 THERAPEUTIC EXERCISES: CPT

## 2018-03-02 NOTE — PROGRESS NOTES
Taylor Kerr  : 1955  Primary: [facility]*  Secondary:  [facility] at [address], [facility], [facility]  [phone]   [phone]       OUTPATIENT PHYSICAL THERAPY:Daily Note 3/2/2018    ICD-10: Treatment Diagnosis: Pain in right shoulder (M25.511)  Treatment diagnosis 2: Impingement syndrome, right shoulder (M75.41)  Treatment diagnosis 3: Calcific tendinitis, right shoulder (M75.31)  Precautions: None  Allergies: Review of patient's allergies indicates no known allergies. Fall Risk Score: 1 (? 5 = High Risk)  MD Orders: evaluate and treat  MEDICAL/REFERRING DIAGNOSIS:  Impingement syndrome of right shoulder [M75.41]  Calcific tendinitis of right shoulder [M75.31]   DATE OF ONSET: Increasing pain for months  REFERRING PHYSICIAN: Nenita Jaime MD  RETURN PHYSICIAN APPOINTMENT: 2018     INITIAL ASSESSMENT:  Mr. Te Hummel is a 57 y.o. male presenting to physical therapy with complaints of pain in right shoulder for the past few months. Patient is a full time  but has decreased work volume due to pain in right shoulder. Pain is increased with overhead work and minimal when upper extremity is below shoulder level. Pain is primarily in anterior shoulder and radiates to medial deltoid. Pain is occasionally in posterior shoulder muscles. Patient reports recent imaging that indicates calcific tendinitis in right shoulder. Patient reports no previous injuries or surgeries. Patient speaks very little [place] and requires a polish . Patient is a good candidate for skilled physical therapy services to include manual therapy, therapeutic exercise, and pain modalities as needed. PROBLEM LIST (Impacting functional limitations):  1. Decreased ADL/Functional Activities  2. Increased Pain  3. Decreased Activity Tolerance  4.  Decreased Flexibility/Joint Mobility INTERVENTIONS PLANNED:  1. Cold  2. Cryotherapy  3. Electrical Stimulation  4. Heat  5. Home Exercise Program (HEP)  6. Manual Therapy  7. Neuromuscular Re-education/Strengthening  8. Range of Motion (ROM)  9. Therapeutic Activites  10. Therapeutic Exercise/Strengthening  11. Transcutaneous Electrical Nerve Stimulation (TENS)  12. Ultrasound (US)   TREATMENT PLAN:  Effective Dates: 2/13/18 TO 3/13/18. Frequency/Duration: 2 times a week for 4 weeks  GOALS: (Goals have been discussed and agreed upon with patient.)  Short-Term Functional Goals: Time Frame: 2/13/18 to 2/27/18  1. Patient will be independent with home program to increase shoulder mobility. 2. Patient will report no more than 5/10 pain in right shoulder with all upper extremity work. Discharge Goals: Time Frame: 2/13/18 to 3/13/18  1. Patient will report no more than 2/10 pain in right shoulder with all overhead work and prolonged painting. 2. Patient will increase shoulder internal rotation bilaterally to 55 degrees to increase mobility. 3. Patient will increase right shoulder abduction to 170 degrees to increase functional mobility. 4. Patient will perform full active elevation of right shoulder/upper extremity with 0/10 pain. 5. Patient will improve Disability of the arm, shoulder, and hand score to 18/55 from 31/55. Rehabilitation Potential For Stated Goals: Good  Regarding [facility] [place] [employer], I certify that the treatment plan above will be carried out by a therapist or under their direction. Thank you for this referral,  Dimitri Pérez PT     Referring Physician Signature: Dick Riddle MD              Date                    The information in this section was collected on 2/13/18 (except where otherwise noted). HISTORY:   History of Present Injury/Illness (Reason for Referral):  Mr. Aristides Bess is a 57 y.o. male presenting to physical therapy with complaints of pain in right shoulder for the past few months.   Patient is a full time  but has decreased work volume due to pain in right shoulder. Pain is increased with overhead work and minimal when upper extremity is below shoulder level. Pain is primarily in anterior shoulder and radiates to medial deltoid. Pain is occasionally in posterior shoulder muscles. Patient reports recent imaging that indicates calcific tendinitis in right shoulder. Patient reports no previous injuries or surgeries. Patient speaks very little [place] and requires a polish . Past Medical History/Comorbidities:   Mr. Trevor Lai  has no past medical history on file. Mr. Trevor Lai  has no past surgical history on file. Social History/Living Environment:    Patient lives at home. Prior Level of Function/Work/Activity:  Full time , self employed. Dominant Side:         RIGHT  Current Medications:       Current Outpatient Prescriptions:     amLODIPine-benazepril (LOTREL) 2.5-10 mg per capsule, Take 1 Cap by mouth daily. , Disp: 90 Cap, Rfl: 3   Date Last Reviewed:  3/2/2018   Number of Personal Factors/Comorbidities that affect the Plan of Care:  (None) 0: LOW COMPLEXITY   EXAMINATION:   Observation/Orthostatic Postural Assessment:          Patient presents with mild rounded shoulders posture. Palpation:          Tender at anterior shoulder (biceps, long head tendon at bicipital groove)  ROM:            Shoulder AROM: (degrees)  Flexion: left=180, right=165 with pain  Abduction: left=180, right=150 with pain  External rotation: left=85, right=80  Internal rotation: left=40, right=40    Strength:            Gross strength in bilateral shoulders and upper extremities is 5/5. Pain only with resisted shoulder flexion (right side).    : left=65 lbs, right=63 lbs    Special Tests:            Giron Syed=negative  Speed's test=positive  Empty can=negative  Hornblower's=negative  Painful arc=positive    Neurological Screen:        Myotomes:  Bilateral upper extremities are normal Dermatomes:  Intact for light touch and sensation    Functional Mobility:           Standing shoulder flexion: left=175, right=175 with pain  Standing shoulder abduction: yxnn=260, right=175 with pain     Body Structures Involved:  1. Bones  2. Joints  3. Muscles Body Functions Affected:  1. Sensory/Pain  2. Movement Related Activities and Participation Affected:  1. General Tasks and Demands  2. Mobility  3. Domestic Life  4. Community, Social and [place]   Number of elements (examined above) that affect the Plan of Care: 1-2: LOW COMPLEXITY   CLINICAL PRESENTATION:   Presentation: Stable and uncomplicated: LOW COMPLEXITY   CLINICAL DECISION MAKING:   Outcome Measure: Tool Used: Disabilities of the Arm, Shoulder and Hand (DASH) Questionnaire - Quick Version  Score:  Initial: 31/55  Most Recent: X/55 (Date: -- )   Interpretation of Score: The DASH is designed to measure the activities of daily living in person's with upper extremity dysfunction or pain. Each section is scored on a 1-5 scale, 5 representing the greatest disability. The scores of each section are added together for a total score of 55. Score 11 12-19 20-28 29-37 38-45 46-54 55   Modifier CH CI CJ CK CL CM CN       Medical Necessity:   · Patient is expected to demonstrate progress in strength, range of motion, coordination and functional technique to decrease pain and increase tolerance for overhead activities and work duties. · Skilled intervention continues to be required due to pain and limitation in right shoulder. Reason for Services/Other Comments:  · Patient continues to require skilled intervention due to pain and limitation in right shoulder.    Use of outcome tool(s) and clinical judgement create a POC that gives a:  (no co-morbidities, moderate pain, moderate limitation on outcome measure) Clear prediction of patient's progress: LOW COMPLEXITY            TREATMENT:   (In addition to Assessment/Re-Assessment sessions the following treatments were rendered)  Pre-treatment Symptoms/Complaints:  Patient reports improvement since starting therapy however continues to report increased right shoulder pain with overhead activities. .    Pain: Initial:   Pain Intensity 1: 3  Pain Location 1: Shoulder  Pain Orientation 1: Anterior, Right  Post Session:  Patient reports 1/10 pain     Therapeutic Exercise: (20 Minutes):  Exercises per grid below to improve mobility, strength, coordination and dynamic movement of shoulder - right to improve functional lifting, carrying, reaching and overhead activites. Required minimal visual, verbal and manual cues to promote proper body alignment, promote proper body posture and promote proper body mechanics. Progressed resistance, range, repetitions and complexity of movement as indicated. Date:  2/27/18 Date:  3-2-18 Date:  2/22/18   Activity/Exercise Parameters Parameters Parameters   Wand flexion      Wand abduction      Push up plus 2x10, against table Wall push ups 2 x 10 1x10, against table   Functional IR stretch      External rotation  2x10, blue, right  1x15, 3 lbs   Prone Y   2x10, 3 lbs   Prone T Standing Tband, 1x10, green  2x10, 3 lbs   Low rows  Green band 2 x 10    Cable rows 3x10, B, 25lbs 12.5 lbs 2 x 10 2x12, B, 22.5lbs   Bilateral ER      Bicep curls 2x10, 10 lbs, eccentric focus  2x10, 10 lbs, eccentric only   Cable punch 2x10, 12.5 lbs, B  2x10, 10 lbs, R   Prone shoulder extension   2x10, 3 lb   Doorway stretch, low      Scapular retraction  Multiple reps    scaption  2 x 10                  Manual Therapy (    Soft Tissue Mobilization Duration  Duration: 25 Minutes):  Inferior and posterior glides of glenohumeral joint in supine with gentle oscillation and distraction    Therapeutic Modalities: for pain and edema                                                               Right Shoulder Ultrasound  Frequency: 1 mHz  Intensity: 1.3 duran/cm sq  Duration : 10 minutes  Patient Position: Sitting HEP: As above; handouts given to patient for all exercises. Treatment/Session Assessment:    · Response to Treatment: decreased shoulder pain with forward elevation after educated in proper scapular positioning and control    · Compliance with Program/Exercises: compliant most of the time. · Recommendations/Intent for next treatment session: \"Next visit will focus on advancements to more challenging activities\".     Total Treatment Duration: 55 minutes    PT Patient Time In/Time Out  Time In: 1100  Time Out: [address] [facility]

## 2018-03-06 ENCOUNTER — HOSPITAL ENCOUNTER (OUTPATIENT)
Dept: PHYSICAL THERAPY | Age: 63
Discharge: HOME | End: 2018-03-06
Payer: COMMERCIAL

## 2018-03-06 PROCEDURE — 97140 MANUAL THERAPY 1/> REGIONS: CPT

## 2018-03-06 PROCEDURE — 97110 THERAPEUTIC EXERCISES: CPT

## 2018-03-06 NOTE — PROGRESS NOTES
Jelena Barragan  : 1955  Primary: Noe Manzanares [facility]*  Secondary:  Therapy Center at [address], [address]  [phone]   [phone]       OUTPATIENT PHYSICAL THERAPY:Daily Note 3/6/2018    ICD-10: Treatment Diagnosis: Pain in right shoulder (M25.511)  Treatment diagnosis 2: Impingement syndrome, right shoulder (M75.41)  Treatment diagnosis 3: Calcific tendinitis, right shoulder (M75.31)  Precautions: None  Allergies: Review of patient's allergies indicates no known allergies. Fall Risk Score: 1 (? 5 = High Risk)  MD Orders: evaluate and treat  MEDICAL/REFERRING DIAGNOSIS:  Impingement syndrome of right shoulder [M75.41]  Calcific tendinitis of right shoulder [M75.31]   DATE OF ONSET: Increasing pain for months  REFERRING PHYSICIAN: Brittnee Hernández MD  RETURN PHYSICIAN APPOINTMENT: 2018     INITIAL ASSESSMENT:  Mr. José Miguel Menendez is a 57 y.o. male presenting to physical therapy with complaints of pain in right shoulder for the past few months. Patient is a full time  but has decreased work volume due to pain in right shoulder. Pain is increased with overhead work and minimal when upper extremity is below shoulder level. Pain is primarily in anterior shoulder and radiates to medial deltoid. Pain is occasionally in posterior shoulder muscles. Patient reports recent imaging that indicates calcific tendinitis in right shoulder. Patient reports no previous injuries or surgeries. Patient speaks very little [place] and requires a polish . Patient is a good candidate for skilled physical therapy services to include manual therapy, therapeutic exercise, and pain modalities as needed. PROBLEM LIST (Impacting functional limitations):  1. Decreased ADL/Functional Activities  2. Increased Pain  3. Decreased Activity Tolerance  4.  Decreased Flexibility/Joint Mobility INTERVENTIONS PLANNED:  1. Cold  2. Cryotherapy  3. Electrical Stimulation  4. Heat  5. Home Exercise Program (HEP)  6. Manual Therapy  7. Neuromuscular Re-education/Strengthening  8. Range of Motion (ROM)  9. Therapeutic Activites  10. Therapeutic Exercise/Strengthening  11. Transcutaneous Electrical Nerve Stimulation (TENS)  12. Ultrasound (US)   TREATMENT PLAN:  Effective Dates: 2/13/18 TO 3/13/18. Frequency/Duration: 2 times a week for 4 weeks  GOALS: (Goals have been discussed and agreed upon with patient.)  Short-Term Functional Goals: Time Frame: 2/13/18 to 2/27/18  1. Patient will be independent with home program to increase shoulder mobility. 2. Patient will report no more than 5/10 pain in right shoulder with all upper extremity work. Discharge Goals: Time Frame: 2/13/18 to 3/13/18  1. Patient will report no more than 2/10 pain in right shoulder with all overhead work and prolonged painting. 2. Patient will increase shoulder internal rotation bilaterally to 55 degrees to increase mobility. 3. Patient will increase right shoulder abduction to 170 degrees to increase functional mobility. 4. Patient will perform full active elevation of right shoulder/upper extremity with 0/10 pain. 5. Patient will improve Disability of the arm, shoulder, and hand score to 18/55 from 31/55. Rehabilitation Potential For Stated Goals: Good  Regarding [facility] [employer], I certify that the treatment plan above will be carried out by a therapist or under their direction. Thank you for this referral,  Karis Carlson PT     Referring Physician Signature: Dorothea Sim MD              Date                    The information in this section was collected on 2/13/18 (except where otherwise noted). HISTORY:   History of Present Injury/Illness (Reason for Referral):  Mr. Trevor Lai is a 57 y.o. male presenting to physical therapy with complaints of pain in right shoulder for the past few months.   Patient is a full time  but has decreased work volume due to pain in right shoulder. Pain is increased with overhead work and minimal when upper extremity is below shoulder level. Pain is primarily in anterior shoulder and radiates to medial deltoid. Pain is occasionally in posterior shoulder muscles. Patient reports recent imaging that indicates calcific tendinitis in right shoulder. Patient reports no previous injuries or surgeries. Patient speaks very little [place] and requires a polish . Past Medical History/Comorbidities:   Mr. Orion Naidu  has no past medical history on file. Mr. Orion Naidu  has no past surgical history on file. Social History/Living Environment:    Patient lives at home. Prior Level of Function/Work/Activity:  Full time , self employed. Dominant Side:         RIGHT  Current Medications:       Current Outpatient Prescriptions:     amLODIPine-benazepril (LOTREL) 2.5-10 mg per capsule, Take 1 Cap by mouth daily. , Disp: 90 Cap, Rfl: 3   Date Last Reviewed:  3/6/2018   Number of Personal Factors/Comorbidities that affect the Plan of Care:  (None) 0: LOW COMPLEXITY   EXAMINATION:   Observation/Orthostatic Postural Assessment:          Patient presents with mild rounded shoulders posture. Palpation:          Tender at anterior shoulder (biceps, long head tendon at bicipital groove)  ROM:            Shoulder AROM: (degrees)  Flexion: left=180, right=165 with pain  Abduction: left=180, right=150 with pain  External rotation: left=85, right=80  Internal rotation: left=40, right=40    Strength:            Gross strength in bilateral shoulders and upper extremities is 5/5. Pain only with resisted shoulder flexion (right side).    : left=65 lbs, right=63 lbs    Special Tests:            Giron Syed=negative  Speed's test=positive  Empty can=negative  Hornblower's=negative  Painful arc=positive    Neurological Screen:        Myotomes:  Bilateral upper extremities are normal Dermatomes:  Intact for light touch and sensation    Functional Mobility:           Standing shoulder flexion: left=175, right=175 with pain  Standing shoulder abduction: pcvs=827, right=175 with pain     Body Structures Involved:  1. Bones  2. Joints  3. Muscles Body Functions Affected:  1. Sensory/Pain  2. Movement Related Activities and Participation Affected:  1. General Tasks and Demands  2. Mobility  3. Domestic Life  4. Community, Social and [place]   Number of elements (examined above) that affect the Plan of Care: 1-2: LOW COMPLEXITY   CLINICAL PRESENTATION:   Presentation: Stable and uncomplicated: LOW COMPLEXITY   CLINICAL DECISION MAKING:   Outcome Measure: Tool Used: Disabilities of the Arm, Shoulder and Hand (DASH) Questionnaire - Quick Version  Score:  Initial: 31/55  Most Recent: X/55 (Date: -- )   Interpretation of Score: The DASH is designed to measure the activities of daily living in person's with upper extremity dysfunction or pain. Each section is scored on a 1-5 scale, 5 representing the greatest disability. The scores of each section are added together for a total score of 55. Score 11 12-19 20-28 29-37 38-45 46-54 55   Modifier CH CI CJ CK CL CM CN       Medical Necessity:   · Patient is expected to demonstrate progress in strength, range of motion, coordination and functional technique to decrease pain and increase tolerance for overhead activities and work duties. · Skilled intervention continues to be required due to pain and limitation in right shoulder. Reason for Services/Other Comments:  · Patient continues to require skilled intervention due to pain and limitation in right shoulder.    Use of outcome tool(s) and clinical judgement create a POC that gives a:  (no co-morbidities, moderate pain, moderate limitation on outcome measure) Clear prediction of patient's progress: LOW COMPLEXITY            TREATMENT:   (In addition to Assessment/Re-Assessment sessions the following treatments were rendered)  Pre-treatment Symptoms/Complaints:  Patient reports minor pain in right shoulder but reports overall improvement with therapy. Pain: Initial:   Pain Intensity 1: 3  Pain Location 1: Shoulder  Pain Orientation 1: Anterior, Right  Post Session:  Patient reports 2/10 pain     Therapeutic Exercise: (45 Minutes):  Exercises per grid below to improve mobility, strength, coordination and dynamic movement of shoulder - right to improve functional lifting, carrying, reaching and overhead activites. Required minimal visual, verbal and manual cues to promote proper body alignment, promote proper body posture and promote proper body mechanics. Progressed resistance, range, repetitions and complexity of movement as indicated. Date:  2/27/18 Date:  3-2-18 Date:  3/6/18   Activity/Exercise Parameters Parameters Parameters   Wand flexion      Wand abduction      Push up plus 2x10, against table Wall push ups 2 x 10 2x10, against wall, green ball   Functional IR stretch   [id number]   External rotation  2x10, blue, right  2x15, green band   Prone Y      Prone T Standing Tband, 1x10, green  Standing, 2x15, green band   Low rows  Green band 2 x 10 2x15, blue   Cable rows 3x10, B, 25lbs 12.5 lbs 2 x 10 3x10, B, 25lbs   Bilateral ER      Bicep curls 2x10, 10 lbs, eccentric focus  2x10, 10 lbs, eccentric only   Cable punch 2x10, 12.5 lbs, B  2x10, 12.5lbs, R   Prone shoulder extension      Doorway stretch, low   [id number]   Scapular retraction  Multiple reps    scaption  2 x 10 2x10, 2 lbs   Rhomboid stretch   [id number]   Wall walks with Tband   10ft x 4, green loop     Manual Therapy (    Soft Tissue Mobilization Duration  Duration: 10 Minutes):  Inferior and posterior glides of glenohumeral joint in supine with gentle oscillation and distraction    Therapeutic Modalities: for pain and edema                                                                                               HEP: As above; handouts given to patient for all exercises. Treatment/Session Assessment:    · Response to Treatment: Patient continues to tolerate and progress all exercises. Patient shows improved tolerance for overhead movements with proper scapular positioning and upper extremity positioning. Discussed with patient proper positioning within scapular plane during upper extremity activities. Patient showed good understanding. · Compliance with Program/Exercises: compliant most of the time. · Recommendations/Intent for next treatment session: \"Next visit will focus on advancements to more challenging activities\".     Total Treatment Duration: 55 minutes    PT Patient Time In/Time Out  Time In: 0900  Time Out: 1200 MILTON [place], PT

## 2018-03-08 ENCOUNTER — HOSPITAL ENCOUNTER (OUTPATIENT)
Dept: PHYSICAL THERAPY | Age: 63
Discharge: HOME | End: 2018-03-08
Payer: COMMERCIAL

## 2018-03-08 PROCEDURE — 97110 THERAPEUTIC EXERCISES: CPT

## 2018-03-08 PROCEDURE — 97140 MANUAL THERAPY 1/> REGIONS: CPT

## 2018-03-08 NOTE — PROGRESS NOTES
Carlos Mustafa  : 1955  Primary: [facility]*  Secondary:  [facility] at [address], [address]  [phone]   [phone]       OUTPATIENT PHYSICAL THERAPY:Daily Note 3/8/2018    ICD-10: Treatment Diagnosis: Pain in right shoulder (M25.511)  Treatment diagnosis 2: Impingement syndrome, right shoulder (M75.41)  Treatment diagnosis 3: Calcific tendinitis, right shoulder (M75.31)  Precautions: None  Allergies: Review of patient's allergies indicates no known allergies. Fall Risk Score: 1 (? 5 = High Risk)  MD Orders: evaluate and treat  MEDICAL/REFERRING DIAGNOSIS:  Impingement syndrome of right shoulder [M75.41]  Calcific tendinitis of right shoulder [M75.31]   DATE OF ONSET: Increasing pain for months  REFERRING PHYSICIAN: Christine Luz MD  RETURN PHYSICIAN APPOINTMENT: 2018     INITIAL ASSESSMENT:  Mr. Sonja Gomez is a 57 y.o. male presenting to physical therapy with complaints of pain in right shoulder for the past few months. Patient is a full time  but has decreased work volume due to pain in right shoulder. Pain is increased with overhead work and minimal when upper extremity is below shoulder level. Pain is primarily in anterior shoulder and radiates to medial deltoid. Pain is occasionally in posterior shoulder muscles. Patient reports recent imaging that indicates calcific tendinitis in right shoulder. Patient reports no previous injuries or surgeries. Patient speaks very little [place] and requires a polish . Patient is a good candidate for skilled physical therapy services to include manual therapy, therapeutic exercise, and pain modalities as needed. PROBLEM LIST (Impacting functional limitations):  1. Decreased ADL/Functional Activities  2. Increased Pain  3. Decreased Activity Tolerance  4.  Decreased Flexibility/Joint Mobility INTERVENTIONS PLANNED:  1. Cold  2. Cryotherapy  3. Electrical Stimulation  4. Heat  5. Home Exercise Program (HEP)  6. Manual Therapy  7. Neuromuscular Re-education/Strengthening  8. Range of Motion (ROM)  9. Therapeutic Activites  10. Therapeutic Exercise/Strengthening  11. Transcutaneous Electrical Nerve Stimulation (TENS)  12. Ultrasound (US)   TREATMENT PLAN:  Effective Dates: 2/13/18 TO 3/13/18. Frequency/Duration: 2 times a week for 4 weeks  GOALS: (Goals have been discussed and agreed upon with patient.)  Short-Term Functional Goals: Time Frame: 2/13/18 to 2/27/18  1. Patient will be independent with home program to increase shoulder mobility. 2. Patient will report no more than 5/10 pain in right shoulder with all upper extremity work. Discharge Goals: Time Frame: 2/13/18 to 3/13/18  1. Patient will report no more than 2/10 pain in right shoulder with all overhead work and prolonged painting. 2. Patient will increase shoulder internal rotation bilaterally to 55 degrees to increase mobility. 3. Patient will increase right shoulder abduction to 170 degrees to increase functional mobility. 4. Patient will perform full active elevation of right shoulder/upper extremity with 0/10 pain. 5. Patient will improve Disability of the arm, shoulder, and hand score to 18/55 from 31/55. Rehabilitation Potential For Stated Goals: Good  Regarding [facility] [employer], I certify that the treatment plan above will be carried out by a therapist or under their direction. Thank you for this referral,  Justina Joseph PT     Referring Physician Signature: Liliana Granger MD              Date                    The information in this section was collected on 2/13/18 (except where otherwise noted). HISTORY:   History of Present Injury/Illness (Reason for Referral):  Mr. Zahra Real is a 57 y.o. male presenting to physical therapy with complaints of pain in right shoulder for the past few months.   Patient is a full time  but has decreased work volume due to pain in right shoulder. Pain is increased with overhead work and minimal when upper extremity is below shoulder level. Pain is primarily in anterior shoulder and radiates to medial deltoid. Pain is occasionally in posterior shoulder muscles. Patient reports recent imaging that indicates calcific tendinitis in right shoulder. Patient reports no previous injuries or surgeries. Patient speaks very little [place] and requires a polish . Past Medical History/Comorbidities:   Mr. Aristides Bess  has no past medical history on file. Mr. Aristides Bess  has no past surgical history on file. Social History/Living Environment:    Patient lives at home. Prior Level of Function/Work/Activity:  Full time , self employed. Dominant Side:         RIGHT  Current Medications:       Current Outpatient Prescriptions:     amLODIPine-benazepril (LOTREL) 2.5-10 mg per capsule, Take 1 Cap by mouth daily. , Disp: 90 Cap, Rfl: 3   Date Last Reviewed:  3/8/2018   Number of Personal Factors/Comorbidities that affect the Plan of Care:  (None) 0: LOW COMPLEXITY   EXAMINATION:   Observation/Orthostatic Postural Assessment:          Patient presents with mild rounded shoulders posture. Palpation:          Tender at anterior shoulder (biceps, long head tendon at bicipital groove)  ROM:            Shoulder AROM: (degrees)  Flexion: left=180, right=165 with pain  Abduction: left=180, right=150 with pain  External rotation: left=85, right=80  Internal rotation: left=40, right=40    Strength:            Gross strength in bilateral shoulders and upper extremities is 5/5. Pain only with resisted shoulder flexion (right side).    : left=65 lbs, right=63 lbs    Special Tests:            Giron Syed=negative  Speed's test=positive  Empty can=negative  Hornblower's=negative  Painful arc=positive    Neurological Screen:        Myotomes:  Bilateral upper extremities are normal Dermatomes:  Intact for light touch and sensation    Functional Mobility:           Standing shoulder flexion: left=175, right=175 with pain  Standing shoulder abduction: txvs=597, right=175 with pain     Body Structures Involved:  1. Bones  2. Joints  3. Muscles Body Functions Affected:  1. Sensory/Pain  2. Movement Related Activities and Participation Affected:  1. General Tasks and Demands  2. Mobility  3. Domestic Life  4. Community, Social and [place]   Number of elements (examined above) that affect the Plan of Care: 1-2: LOW COMPLEXITY   CLINICAL PRESENTATION:   Presentation: Stable and uncomplicated: LOW COMPLEXITY   CLINICAL DECISION MAKING:   Outcome Measure: Tool Used: Disabilities of the Arm, Shoulder and Hand (DASH) Questionnaire - Quick Version  Score:  Initial: 31/55  Most Recent: X/55 (Date: -- )   Interpretation of Score: The DASH is designed to measure the activities of daily living in person's with upper extremity dysfunction or pain. Each section is scored on a 1-5 scale, 5 representing the greatest disability. The scores of each section are added together for a total score of 55. Score 11 12-19 20-28 29-37 38-45 46-54 55   Modifier CH CI CJ CK CL CM CN       Medical Necessity:   · Patient is expected to demonstrate progress in strength, range of motion, coordination and functional technique to decrease pain and increase tolerance for overhead activities and work duties. · Skilled intervention continues to be required due to pain and limitation in right shoulder. Reason for Services/Other Comments:  · Patient continues to require skilled intervention due to pain and limitation in right shoulder.    Use of outcome tool(s) and clinical judgement create a POC that gives a:  (no co-morbidities, moderate pain, moderate limitation on outcome measure) Clear prediction of patient's progress: LOW COMPLEXITY            TREATMENT:   (In addition to Assessment/Re-Assessment sessions the following treatments were rendered)  Pre-treatment Symptoms/Complaints:  Patient reports over all improvement since starting therapy however continues to report shoulder pain with over head activities. Pain: Initial:   Pain Intensity 1: 3  Pain Location 1: Shoulder  Pain Orientation 1: Right  Post Session:  Patient reports 1/10 pain     Therapeutic Exercise: (45 Minutes):  Exercises per grid below to improve mobility, strength, coordination and dynamic movement of shoulder - right to improve functional lifting, carrying, reaching and overhead activites. Required minimal visual, verbal and manual cues to promote proper body alignment, promote proper body posture and promote proper body mechanics. Progressed resistance, range, repetitions and complexity of movement as indicated. Date:  3-8-18 Date:  3-2-18 Date:  3/6/18   Activity/Exercise Parameters Parameters Parameters   Wand flexion      Wand abduction      Push up plus 2x12, against wall green ball Wall push ups 2 x 10 2x10, against wall, green ball   Functional IR stretch 3 x 20 sec  [id number]   External rotation  2x15, blue, right  2x15, green band   Prone Y      Prone T Standing Tband, blue 2 x 15  Standing, 2x15, green band   Low rows Blue 2 x 15 Green band 2 x 10 2x15, blue   Cable rows 3x10, B, 25lbs 12.5 lbs 2 x 10 3x10, B, 25lbs   Bilateral ER      Bicep curls 2x10, 10 lbs, eccentric focus  2x10, 10 lbs, eccentric only   Cable punch 2x10, 25 lbs , B  2x10, 12.5lbs, R   Prone shoulder extension      Doorway stretch, low 3 x 20 sec  [id number]   Scapular retraction  Multiple reps    scaption 3 lbs 2 x 12 2 x 10 2x10, 2 lbs   Rhomboid stretch   [id number]   Wall walks with Tband 10 ft x 4  10ft x 4, green loop     Manual Therapy (    Soft Tissue Mobilization Duration  Duration: 10 Minutes):  Inferior and posterior glides of glenohumeral joint in supine with gentle oscillation and distraction    Therapeutic Modalities: for pain and edema HEP: As above; handouts given to patient for all exercises. Treatment/Session Assessment:    · Response to Treatment: improved ROM and functional strength. · Compliance with Program/Exercises: compliant most of the time. · Recommendations/Intent for next treatment session: \"Next visit will focus on advancements to more challenging activities\".     Total Treatment Duration: 55 minutes    PT Patient Time In/Time Out  Time In: 9961  Time Out: [facility]

## 2018-03-12 ENCOUNTER — APPOINTMENT (OUTPATIENT)
Dept: PHYSICAL THERAPY | Age: 63
End: 2018-03-12
Payer: COMMERCIAL

## 2018-03-13 ENCOUNTER — HOSPITAL ENCOUNTER (OUTPATIENT)
Dept: PHYSICAL THERAPY | Age: 63
Discharge: HOME | End: 2018-03-13
Payer: COMMERCIAL

## 2018-03-13 PROCEDURE — 97110 THERAPEUTIC EXERCISES: CPT

## 2018-03-13 PROCEDURE — 97140 MANUAL THERAPY 1/> REGIONS: CPT

## 2018-03-13 NOTE — PROGRESS NOTES
Jose Sniderter  : 1955  Primary: [facility]*  Secondary:  [facility] at [place] [address], [place], [address]  [phone]   [phone]       OUTPATIENT PHYSICAL THERAPY:Daily Note 3/13/2018    ICD-10: Treatment Diagnosis: Pain in right shoulder (M25.511)  Treatment diagnosis 2: Impingement syndrome, right shoulder (M75.41)  Treatment diagnosis 3: Calcific tendinitis, right shoulder (M75.31)  Precautions: None  Allergies: Review of patient's allergies indicates no known allergies. Fall Risk Score: 1 (? 5 = High Risk)  MD Orders: evaluate and treat  MEDICAL/REFERRING DIAGNOSIS:  Impingement syndrome of right shoulder [M75.41]  Calcific tendinitis of right shoulder [M75.31]   DATE OF ONSET: Increasing pain for months  REFERRING PHYSICIAN: Nemesio Robertson MD  RETURN PHYSICIAN APPOINTMENT: 2018     INITIAL ASSESSMENT:  Mr. Parvez Bedolla is a 57 y.o. male presenting to physical therapy with complaints of pain in right shoulder for the past few months. Patient is a full time  but has decreased work volume due to pain in right shoulder. Pain is increased with overhead work and minimal when upper extremity is below shoulder level. Pain is primarily in anterior shoulder and radiates to medial deltoid. Pain is occasionally in posterior shoulder muscles. Patient reports recent imaging that indicates calcific tendinitis in right shoulder. Patient reports no previous injuries or surgeries. Patient speaks very little [place] and requires a polish . Patient is a good candidate for skilled physical therapy services to include manual therapy, therapeutic exercise, and pain modalities as needed. PROBLEM LIST (Impacting functional limitations):  1. Decreased ADL/Functional Activities  2. Increased Pain  3. Decreased Activity Tolerance  4.  Decreased Flexibility/Joint Mobility INTERVENTIONS PLANNED:  1. Cold  2. Cryotherapy  3. Electrical Stimulation  4. Heat  5. Home Exercise Program (HEP)  6. Manual Therapy  7. Neuromuscular Re-education/Strengthening  8. Range of Motion (ROM)  9. Therapeutic Activites  10. Therapeutic Exercise/Strengthening  11. Transcutaneous Electrical Nerve Stimulation (TENS)  12. Ultrasound (US)   TREATMENT PLAN:  Effective Dates: 2/13/18 TO 3/13/18. Frequency/Duration: 2 times a week for 4 weeks  GOALS: (Goals have been discussed and agreed upon with patient.)  Short-Term Functional Goals: Time Frame: 2/13/18 to 2/27/18  1. Patient will be independent with home program to increase shoulder mobility. 2. Patient will report no more than 5/10 pain in right shoulder with all upper extremity work. Discharge Goals: Time Frame: 2/13/18 to 3/13/18  1. Patient will report no more than 2/10 pain in right shoulder with all overhead work and prolonged painting. 2. Patient will increase shoulder internal rotation bilaterally to 55 degrees to increase mobility. 3. Patient will increase right shoulder abduction to 170 degrees to increase functional mobility. 4. Patient will perform full active elevation of right shoulder/upper extremity with 0/10 pain. 5. Patient will improve Disability of the arm, shoulder, and hand score to 18/55 from 31/55. Rehabilitation Potential For Stated Goals: Good  Regarding [facility] [employer], I certify that the treatment plan above will be carried out by a therapist or under their direction. Thank you for this referral,  Uday Carvalho PT     Referring Physician Signature: Artur Fisher MD              Date                    The information in this section was collected on 2/13/18 (except where otherwise noted). HISTORY:   History of Present Injury/Illness (Reason for Referral):  Mr. Sarwat Heaton is a 57 y.o. male presenting to physical therapy with complaints of pain in right shoulder for the past few months.   Patient is a full time  but has decreased work volume due to pain in right shoulder. Pain is increased with overhead work and minimal when upper extremity is below shoulder level. Pain is primarily in anterior shoulder and radiates to medial deltoid. Pain is occasionally in posterior shoulder muscles. Patient reports recent imaging that indicates calcific tendinitis in right shoulder. Patient reports no previous injuries or surgeries. Patient speaks very little [place] and requires a polish . Past Medical History/Comorbidities:   Mr. Ronnie Wilson  has no past medical history on file. Mr. Ronnie Wilson  has no past surgical history on file. Social History/Living Environment:    Patient lives at home. Prior Level of Function/Work/Activity:  Full time , self employed. Dominant Side:         RIGHT  Current Medications:       Current Outpatient Prescriptions:     amLODIPine-benazepril (LOTREL) 2.5-10 mg per capsule, Take 1 Cap by mouth daily. , Disp: 90 Cap, Rfl: 3   Date Last Reviewed:  3/13/2018   Number of Personal Factors/Comorbidities that affect the Plan of Care:  (None) 0: LOW COMPLEXITY   EXAMINATION:   Observation/Orthostatic Postural Assessment:          Patient presents with mild rounded shoulders posture. Palpation:          Tender at anterior shoulder (biceps, long head tendon at bicipital groove)  ROM:            Shoulder AROM: (degrees)  Flexion: left=180, right=165 with pain  Abduction: left=180, right=150 with pain  External rotation: left=85, right=80  Internal rotation: left=40, right=40    Strength:            Gross strength in bilateral shoulders and upper extremities is 5/5. Pain only with resisted shoulder flexion (right side).    : left=65 lbs, right=63 lbs    Special Tests:            Giron Syed=negative  Speed's test=positive  Empty can=negative  Hornblower's=negative  Painful arc=positive    Neurological Screen:        Myotomes:  Bilateral upper extremities are normal Dermatomes:  Intact for light touch and sensation    Functional Mobility:           Standing shoulder flexion: left=175, right=175 with pain  Standing shoulder abduction: edyr=106, right=175 with pain     Body Structures Involved:  1. Bones  2. Joints  3. Muscles Body Functions Affected:  1. Sensory/Pain  2. Movement Related Activities and Participation Affected:  1. General Tasks and Demands  2. Mobility  3. Domestic Life  4. Community, Social and [place]   Number of elements (examined above) that affect the Plan of Care: 1-2: LOW COMPLEXITY   CLINICAL PRESENTATION:   Presentation: Stable and uncomplicated: LOW COMPLEXITY   CLINICAL DECISION MAKING:   Outcome Measure: Tool Used: Disabilities of the Arm, Shoulder and Hand (DASH) Questionnaire - Quick Version  Score:  Initial: 31/55  Most Recent: X/55 (Date: -- )   Interpretation of Score: The DASH is designed to measure the activities of daily living in person's with upper extremity dysfunction or pain. Each section is scored on a 1-5 scale, 5 representing the greatest disability. The scores of each section are added together for a total score of 55. Score 11 12-19 20-28 29-37 38-45 46-54 55   Modifier CH CI CJ CK CL CM CN       Medical Necessity:   · Patient is expected to demonstrate progress in strength, range of motion, coordination and functional technique to decrease pain and increase tolerance for overhead activities and work duties. · Skilled intervention continues to be required due to pain and limitation in right shoulder. Reason for Services/Other Comments:  · Patient continues to require skilled intervention due to pain and limitation in right shoulder.    Use of outcome tool(s) and clinical judgement create a POC that gives a:  (no co-morbidities, moderate pain, moderate limitation on outcome measure) Clear prediction of patient's progress: LOW COMPLEXITY            TREATMENT:   (In addition to Assessment/Re-Assessment sessions the following treatments were rendered)  Pre-treatment Symptoms/Complaints:  Patient states he was able to paint overhead yesterday several hours without difficulty. .      Pain: Initial:   Pain Intensity 1: 2  Pain Location 1: Shoulder  Pain Orientation 1: Right  Post Session:  Patient reports 1/10 pain     Therapeutic Exercise: (45 Minutes):  Exercises per grid below to improve mobility, strength, coordination and dynamic movement of shoulder - right to improve functional lifting, carrying, reaching and overhead activites. Required minimal visual, verbal and manual cues to promote proper body alignment, promote proper body posture and promote proper body mechanics. Progressed resistance, range, repetitions and complexity of movement as indicated. Date:  3-8-18 Date:  3-13-18 Date:  3/6/18   Activity/Exercise Parameters Parameters Parameters   Wand flexion      Wand abduction      Push up plus 2x12, against wall green ball Wall push ups 2 x 15 2x10, against wall, green ball   Functional IR stretch 3 x 20 sec 10 x 10 sec [id number]   External rotation  2x15, blue, right 2 x 15 blue 2x15, green band   Prone Y      Prone T Standing Tband, blue 2 x 15 Standing blue 2 x 10 Standing, 2x15, green band   Low rows Blue 2 x 15 Black 2 x 15 2x15, blue   Cable rows 3x10, B, 25lbs 25 lbs 3 x 12 3x10, B, 25lbs   Bilateral ER  Red 2 x 10    Bicep curls 2x10, 10 lbs, eccentric focus  2x10, 10 lbs, eccentric only   Cable punch 2x10, 25 lbs , B 12.5 lbs 3 x 12 2x10, 12.5lbs, R   Prone shoulder extension      Doorway stretch, low 3 x 20 sec  [id number]   Scapular retraction      scaption 3 lbs 2 x 12 3 lbs 3 x 10 2x10, 2 lbs   Rhomboid stretch   [id number]   Wall walks with Tband 10 ft x 4 Green 10 ft x 4 10ft x 4, green loop   Internal rotation  Blue 2 x 15            Manual Therapy (    Soft Tissue Mobilization Duration  Duration: 10 Minutes):  Inferior and posterior glides of glenohumeral joint in supine with gentle oscillation and distraction followed by PROM all planes    Therapeutic Modalities: for pain and edema                                                                                               HEP: As above; handouts given to patient for all exercises. Treatment/Session Assessment:    · Response to Treatment: improved ROM and functional strength. · Compliance with Program/Exercises: compliant most of the time. · Recommendations/Intent for next treatment session: \"Next visit will focus on advancements to more challenging activities\".     Total Treatment Duration: 55 minutes    PT Patient Time In/Time Out  Time In: 0900  Time Out: Heath Gallegos 14, PTA

## 2018-03-14 ENCOUNTER — HOSPITAL ENCOUNTER (OUTPATIENT)
Dept: PHYSICAL THERAPY | Age: 63
Discharge: HOME | End: 2018-03-14
Payer: COMMERCIAL

## 2018-03-14 PROCEDURE — 97110 THERAPEUTIC EXERCISES: CPT

## 2018-03-14 NOTE — THERAPY DISCHARGE
Audie Campos  : 1955  Primary: [facility]*  Secondary:  [facility] [place] [address], [place], [address]  [phone]   [phone]       OUTPATIENT PHYSICAL THERAPY:Daily Note, Progress Report and Discharge 3/14/2018    ICD-10: Treatment Diagnosis: Pain in right shoulder (M25.511)  Treatment diagnosis 2: Impingement syndrome, right shoulder (M75.41)  Treatment diagnosis 3: Calcific tendinitis, right shoulder (M75.31)  Precautions: None  Allergies: Review of patient's allergies indicates no known allergies. Fall Risk Score: 1 (? 5 = High Risk)  MD Orders: evaluate and treat  MEDICAL/REFERRING DIAGNOSIS:  Impingement syndrome of right shoulder [M75.41]  Calcific tendinitis of right shoulder [M75.31]   DATE OF ONSET: Increasing pain for months  REFERRING PHYSICIAN: Rufus Garcia MD  RETURN PHYSICIAN APPOINTMENT: 2018     INITIAL ASSESSMENT:  Mr. Fátima Malloy is a 57 y.o. male presenting to physical therapy with complaints of pain in right shoulder for the past few months. Patient is a full time  but has decreased work volume due to pain in right shoulder. Pain is increased with overhead work and minimal when upper extremity is below shoulder level. Pain is primarily in anterior shoulder and radiates to medial deltoid. Pain is occasionally in posterior shoulder muscles. Patient reports recent imaging that indicates calcific tendinitis in right shoulder. Patient reports no previous injuries or surgeries. Patient speaks very little [place] and requires a polish . Patient has been seen for 9 sessions from 18 to 3/14/18. Patient has met goals for functional range of motion, pain reduction, and upper extremity function. 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 Patient reports that he is ready to discontinue therapy.   Patient to continue with home program and be discharged at this time. PROBLEM LIST (Impacting functional limitations):  1. Decreased ADL/Functional Activities  2. Increased Pain  3. Decreased Activity Tolerance  4. Decreased Flexibility/Joint Mobility INTERVENTIONS PLANNED:  1. Cold  2. Cryotherapy  3. Electrical Stimulation  4. Heat  5. Home Exercise Program (HEP)  6. Manual Therapy  7. Neuromuscular Re-education/Strengthening  8. Range of Motion (ROM)  9. Therapeutic Activites  10. Therapeutic Exercise/Strengthening  11. Transcutaneous Electrical Nerve Stimulation (TENS)  12. Ultrasound (US)   TREATMENT PLAN:  Effective Dates: 2/13/18 TO 3/13/18. Frequency/Duration: Patient has been seen for 9 sessions from 2/13/18 to 3/14/18. Patient has met goals for functional range of motion, pain reduction, and upper extremity function. 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 Patient reports that he is ready to discontinue therapy. Patient to continue with home program and be discharged at this time. GOALS: (Goals have been discussed and agreed upon with patient.)  Short-Term Functional Goals: Time Frame: 2/13/18 to 2/27/18  1. Patient will be independent with home program to increase shoulder mobility. -met  2. Patient will report no more than 5/10 pain in right shoulder with all upper extremity work.-met  Discharge Goals: Time Frame: 2/13/18 to 3/13/18  1. Patient will report no more than 2/10 pain in right shoulder with all overhead work and prolonged painting.-met  2. Patient will increase shoulder internal rotation bilaterally to 55 degrees to increase mobility. -not met  3. Patient will increase right shoulder abduction to 170 degrees to increase functional mobility. -met  4. Patient will perform full active elevation of right shoulder/upper extremity with 0/10 pain.-met  5.  Patient will improve Disability of the arm, shoulder, and hand score to 18/55 from 31/55.-met  Rehabilitation Potential For Stated Goals: Good  Regarding Higinio Ceannate therapy, I certify that the treatment plan above will be carried out by a therapist or under their direction. Thank you for this referral,  Mikhail Ibrahim PT     Referring Physician Signature: Suni Chandler MD              Date                    The information in this section was collected on 3/14/18 (except where otherwise noted). HISTORY:   History of Present Injury/Illness (Reason for Referral):  Mr. Ronnie Wilson is a 57 y.o. male presenting to physical therapy with complaints of pain in right shoulder for the past few months. Patient is a full time  but has decreased work volume due to pain in right shoulder. Pain is increased with overhead work and minimal when upper extremity is below shoulder level. Pain is primarily in anterior shoulder and radiates to medial deltoid. Pain is occasionally in posterior shoulder muscles. Patient reports recent imaging that indicates calcific tendinitis in right shoulder. Patient reports no previous injuries or surgeries. Patient speaks very little [place] and requires a polish . Past Medical History/Comorbidities:   Mr. Ronnie Wilson  has no past medical history on file. Mr. Ronnie Wilson  has no past surgical history on file. Social History/Living Environment:    Patient lives at home. Prior Level of Function/Work/Activity:  Full time , self employed. Dominant Side:         RIGHT  Current Medications:       Current Outpatient Prescriptions:     amLODIPine-benazepril (LOTREL) 2.5-10 mg per capsule, Take 1 Cap by mouth daily. , Disp: 90 Cap, Rfl: 3   Date Last Reviewed:  3/14/2018   Number of Personal Factors/Comorbidities that affect the Plan of Care:  (None) 0: LOW COMPLEXITY   EXAMINATION:   Observation/Orthostatic Postural Assessment:          Patient presents with mild rounded shoulders posture.   Palpation:          No tenderness at right shoulder  ROM:            Shoulder AROM: (degrees)  Flexion: left=180, right=170  Abduction: left=180, right=170  External rotation: left=85, right=85  Internal rotation: left=40, right=40    Strength:            Gross strength in bilateral shoulders and upper extremities is 5/5. : left=65 lbs, right=65 lbs    Special Tests:            Giron Syed=negative  Speed's test=negative  Empty can=negative  Hornblower's=negative  Painful arc=negative    Neurological Screen:        Myotomes:  Bilateral upper extremities are normal        Dermatomes:  Intact for light touch and sensation    Functional Mobility:           Standing shoulder flexion: left=175, right=170  Standing shoulder abduction: mvco=525, right=175      Body Structures Involved:  1. Bones  2. Joints  3. Muscles Body Functions Affected:  1. Sensory/Pain  2. Movement Related Activities and Participation Affected:  1. General Tasks and Demands  2. Mobility  3. Domestic Life  4. Community, Social and [place]   Number of elements (examined above) that affect the Plan of Care: 1-2: LOW COMPLEXITY   CLINICAL PRESENTATION:   Presentation: Stable and uncomplicated: LOW COMPLEXITY   CLINICAL DECISION MAKING:   Outcome Measure: Tool Used: Disabilities of the Arm, Shoulder and Hand (DASH) Questionnaire - Quick Version  Score:  Initial: 31/55  Most Recent: 15/55 (Date: 3/14/18 )   Interpretation of Score: The DASH is designed to measure the activities of daily living in person's with upper extremity dysfunction or pain. Each section is scored on a 1-5 scale, 5 representing the greatest disability. The scores of each section are added together for a total score of 55. Score 11 12-19 20-28 29-37 38-45 46-54 55   Modifier CH CI CJ CK CL CM CN       Medical Necessity:   Patient has been seen for 9 sessions from 2/13/18 to 3/14/18. Patient has met goals for functional range of motion, pain reduction, and upper extremity function.   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 Patient reports that he is ready to discontinue therapy. Patient to continue with home program and be discharged at this time. Use of outcome tool(s) and clinical judgement create a POC that gives a:  (no co-morbidities, moderate pain, moderate limitation on outcome measure) Clear prediction of patient's progress: LOW COMPLEXITY            TREATMENT:   (In addition to Assessment/Re-Assessment sessions the following treatments were rendered)  Pre-treatment Symptoms/Complaints:  Patient reports no pain today and reports that he is ready for discharge. Pain: Initial:   Pain Intensity 1: 0  Pain Location 1: Shoulder  Pain Orientation 1: Right  Post Session:  Patient reports 0/10 pain     Therapeutic Exercise: (45 Minutes):  Exercises per grid below to improve mobility, strength, coordination and dynamic movement of shoulder - right to improve functional lifting, carrying, reaching and overhead activites. Required minimal visual, verbal and manual cues to promote proper body alignment, promote proper body posture and promote proper body mechanics. Progressed resistance, range, repetitions and complexity of movement as indicated.    Date:  3-8-18 Date:  3-13-18 Date:  3/14/18   Activity/Exercise Parameters Parameters Parameters   Wand flexion      Wand abduction      Push up plus 2x12, against wall green ball Wall push ups 2 x 15 2x10, against table   Functional IR stretch 3 x 20 sec 10 x 10 sec [id number]   External rotation  2x15, blue, right 2 x 15 blue 1x15, green band   Prone Y   1x10   Prone T Standing Tband, blue 2 x 15 Standing blue 2 x 10 1x10   Low rows Blue 2 x 15 Black 2 x 15 1x10, black   Cable rows 3x10, B, 25lbs 25 lbs 3 x 12    Bilateral ER  Red 2 x 10    Bicep curls 2x10, 10 lbs, eccentric focus     Cable punch 2x10, 25 lbs , B 12.5 lbs 3 x 12    Prone shoulder extension   1x10   Doorway stretch, low 3 x 20 sec  [id number] Scapular retraction      scaption 3 lbs 2 x 12 3 lbs 3 x 10    Rhomboid stretch      Wall walks with Tband 10 ft x 4 Green 10 ft x 4    Internal rotation  Blue 2 x 15            Manual Therapy (     ): None today    Therapeutic Modalities: for pain and edema                                                                                               HEP: As above; handouts given to patient for all exercises. Treatment/Session Assessment:    · Response to Treatment: Patient reports minimal pain with ADL's and reports no issues with home program.  Patient reports no pain with overhead active motions with shoulder. · Compliance with Program/Exercises: compliant most of the time. · Recommendations/Intent for next treatment session: Patient to be discharged at this time.      Total Treatment Duration: 45 minutes    PT Patient Time In/Time Out  Time In: 1000  Time Out: [facility], PT

## 2018-10-09 ENCOUNTER — HOSPITAL ENCOUNTER (OUTPATIENT)
Dept: PHYSICAL THERAPY | Age: 63
Discharge: HOME | End: 2018-10-09
Payer: COMMERCIAL

## 2018-10-09 ENCOUNTER — HOSPITAL ENCOUNTER (OUTPATIENT)
Dept: SURGERY | Age: 63
Discharge: HOME | End: 2018-10-09
Payer: COMMERCIAL

## 2018-10-09 VITALS
TEMPERATURE: 97.6 F | OXYGEN SATURATION: 96 % | RESPIRATION RATE: 16 BRPM | HEART RATE: 84 BPM | HEIGHT: 65 IN | DIASTOLIC BLOOD PRESSURE: 93 MMHG | WEIGHT: 165 LBS | BODY MASS INDEX: 27.49 KG/M2 | SYSTOLIC BLOOD PRESSURE: 156 MMHG

## 2018-10-09 LAB
ALBUMIN SERPL-MCNC: 3.9 G/DL (ref 3.2–4.6)
ANION GAP SERPL CALC-SCNC: 8 MMOL/L
APPEARANCE UR: CLEAR
APTT PPP: 27.6 SEC (ref 23.2–35.3)
BACTERIA SPEC CULT: NORMAL
BACTERIA URNS QL MICRO: 0 /HPF
BASOPHILS # BLD: 0.1 K/UL (ref 0–0.2)
BASOPHILS NFR BLD: 1 % (ref 0–2)
BILIRUB UR QL: NEGATIVE
BUN SERPL-MCNC: 12 MG/DL (ref 8–23)
CALCIUM SERPL-MCNC: 9.6 MG/DL (ref 8.3–10.4)
CASTS URNS QL MICRO: ABNORMAL /LPF
CHLORIDE SERPL-SCNC: 104 MMOL/L (ref 98–107)
CO2 SERPL-SCNC: 31 MMOL/L (ref 21–32)
COLOR UR: YELLOW
CREAT SERPL-MCNC: 1.04 MG/DL (ref 0.8–1.5)
DIFFERENTIAL METHOD BLD: NORMAL
EOSINOPHIL # BLD: 0.3 K/UL (ref 0–0.8)
EOSINOPHIL NFR BLD: 3 % (ref 0.5–7.8)
EPI CELLS #/AREA URNS HPF: ABNORMAL /HPF
ERYTHROCYTE [DISTWIDTH] IN BLOOD BY AUTOMATED COUNT: 12.9 %
EST. AVERAGE GLUCOSE BLD GHB EST-MCNC: 143 MG/DL
GLUCOSE SERPL-MCNC: 126 MG/DL (ref 65–100)
GLUCOSE UR STRIP.AUTO-MCNC: NEGATIVE MG/DL
HBA1C MFR BLD: 6.6 %
HCT VFR BLD AUTO: 48.1 % (ref 41.1–50.3)
HGB BLD-MCNC: 16.3 G/DL (ref 13.6–17.2)
HGB UR QL STRIP: NEGATIVE
IMM GRANULOCYTES # BLD: 0 K/UL (ref 0–0.5)
IMM GRANULOCYTES NFR BLD AUTO: 0 % (ref 0–5)
INR PPP: 1
KETONES UR QL STRIP.AUTO: NEGATIVE MG/DL
LEUKOCYTE ESTERASE UR QL STRIP.AUTO: NEGATIVE
LYMPHOCYTES # BLD: 1.9 K/UL (ref 0.5–4.6)
LYMPHOCYTES NFR BLD: 20 % (ref 13–44)
MCH RBC QN AUTO: 30 PG (ref 26.1–32.9)
MCHC RBC AUTO-ENTMCNC: 33.9 G/DL (ref 31.4–35)
MCV RBC AUTO: 88.4 FL (ref 79.6–97.8)
MONOCYTES # BLD: 0.7 K/UL (ref 0.1–1.3)
MONOCYTES NFR BLD: 8 % (ref 4–12)
NEUTS SEG # BLD: 6.4 K/UL (ref 1.7–8.2)
NEUTS SEG NFR BLD: 69 % (ref 43–78)
NITRITE UR QL STRIP.AUTO: NEGATIVE
NRBC # BLD: 0 K/UL (ref 0–0.2)
PH UR STRIP: 8 [PH] (ref 5–9)
PLATELET # BLD AUTO: 181 K/UL (ref 150–450)
PMV BLD AUTO: 10.7 FL (ref 9.4–12.3)
POTASSIUM SERPL-SCNC: 4 MMOL/L (ref 3.5–5.1)
PROT UR STRIP-MCNC: 30 MG/DL
PROTHROMBIN TIME: 13.1 SEC (ref 11.5–14.5)
RBC # BLD AUTO: 5.44 M/UL (ref 4.23–5.6)
RBC #/AREA URNS HPF: ABNORMAL /HPF
SERVICE CMNT-IMP: NORMAL
SODIUM SERPL-SCNC: 143 MMOL/L (ref 136–145)
SP GR UR REFRACTOMETRY: 1.02 (ref 1–1.02)
UROBILINOGEN UR QL STRIP.AUTO: 0.2 EU/DL (ref 0.2–1)
WBC # BLD AUTO: 9.3 K/UL (ref 4.3–11.1)
WBC URNS QL MICRO: 0 /HPF

## 2018-10-09 PROCEDURE — 85730 THROMBOPLASTIN TIME PARTIAL: CPT

## 2018-10-09 PROCEDURE — 80048 BASIC METABOLIC PNL TOTAL CA: CPT

## 2018-10-09 PROCEDURE — 80307 DRUG TEST PRSMV CHEM ANLYZR: CPT

## 2018-10-09 PROCEDURE — 97161 PT EVAL LOW COMPLEX 20 MIN: CPT

## 2018-10-09 PROCEDURE — 83036 HEMOGLOBIN GLYCOSYLATED A1C: CPT

## 2018-10-09 PROCEDURE — 87641 MR-STAPH DNA AMP PROBE: CPT

## 2018-10-09 PROCEDURE — 82040 ASSAY OF SERUM ALBUMIN: CPT

## 2018-10-09 PROCEDURE — 85025 COMPLETE CBC W/AUTO DIFF WBC: CPT

## 2018-10-09 PROCEDURE — 36415 COLL VENOUS BLD VENIPUNCTURE: CPT

## 2018-10-09 PROCEDURE — 85610 PROTHROMBIN TIME: CPT

## 2018-10-09 PROCEDURE — 81001 URINALYSIS AUTO W/SCOPE: CPT

## 2018-10-09 NOTE — ADVANCED PRACTICE NURSE
Total Joint Surgery Preoperative Chart Review      Patient ID:  April Bro  [id number]  20 y.o.  1955  Surgeon: Dr. Allan Seymour  Date of Surgery: 10/17/2018  Procedure: Total Right Hip Arthroplasty  Primary Care Physician: None None  Specialty Physician(s):      Subjective:   April Bro is a 70 y.o. WHITE OR  male who presents for preoperative evaluation for Total Right Hip arthroplasty. This is a preoperative chart review note based on data collected by the nurse at the surgical Pre-Assessment visit. Past Medical History:   Diagnosis Date    Arthritis     GERD (gastroesophageal reflux disease)     no meds     HTN (hypertension)     Hyperlipidemia     PAC (premature atrial contraction)     Palpitations       History reviewed. No pertinent surgical history. History reviewed. No pertinent family history. Social History   Substance Use Topics    Smoking status: Never Smoker    Smokeless tobacco: Never Used    Alcohol use Yes      Comment: maybe 1 times per week       Prior to Admission medications    Medication Sig Start Date End Date Taking? Authorizing Provider   MULTIVITAMIN PO Take  by mouth daily. Yes Historical Provider   beta-carotene,A,-vits C and E (ANTIOXIDANT PO) Take  by mouth daily. Yes Historical Provider   OTHER,NON-FORMULARY, Calcium, magnesium, zinc and vitamin D3 OTC supplement. 1 tab by mouth daily. Yes Historical Provider   amLODIPine-benazepril (LOTREL) 2.5-10 mg per capsule Take 1 Cap by mouth daily.  7/5/18   Vish Louie MD     No Known Allergies       Objective:     Physical Exam:   Patient Vitals for the past 24 hrs:   Temp Pulse Resp BP SpO2   10/09/18 1036 97.6 °F (36.4 °C) 84 16 (!) 156/93 96 %       ECG:    EKG Results     None          Data Review:   Labs:   Recent Results (from the past 24 hour(s))   CBC WITH AUTOMATED DIFF    Collection Time: 10/09/18  9:15 AM   Result Value Ref Range    WBC 9.3 4.3 - 11.1 K/uL    RBC 5.44 4.23 - 5.6 M/uL HGB 16.3 13.6 - 17.2 g/dL    HCT 48.1 41.1 - 50.3 %    MCV 88.4 79.6 - 97.8 FL    MCH 30.0 26.1 - 32.9 PG    MCHC 33.9 31.4 - 35.0 g/dL    RDW 12.9 %    PLATELET 975 994 - 369 K/uL    MPV 10.7 9.4 - 12.3 FL    ABSOLUTE NRBC 0.00 0.0 - 0.2 K/uL    DF AUTOMATED      NEUTROPHILS 69 43 - 78 %    LYMPHOCYTES 20 13 - 44 %    MONOCYTES 8 4.0 - 12.0 %    EOSINOPHILS 3 0.5 - 7.8 %    BASOPHILS 1 0.0 - 2.0 %    IMMATURE GRANULOCYTES 0 0.0 - 5.0 %    ABS. NEUTROPHILS 6.4 1.7 - 8.2 K/UL    ABS. LYMPHOCYTES 1.9 0.5 - 4.6 K/UL    ABS. MONOCYTES 0.7 0.1 - 1.3 K/UL    ABS. EOSINOPHILS 0.3 0.0 - 0.8 K/UL    ABS. BASOPHILS 0.1 0.0 - 0.2 K/UL    ABS. IMM.  GRANS. 0.0 0.0 - 0.5 K/UL   PROTHROMBIN TIME + INR    Collection Time: 10/09/18  9:15 AM   Result Value Ref Range    Prothrombin time 13.1 11.5 - 14.5 sec    INR 1.0     PTT    Collection Time: 10/09/18  9:15 AM   Result Value Ref Range    aPTT 27.6 23.2 - 00.0 SEC   METABOLIC PANEL, BASIC    Collection Time: 10/09/18  9:15 AM   Result Value Ref Range    Sodium 143 136 - 145 mmol/L    Potassium 4.0 3.5 - 5.1 mmol/L    Chloride 104 98 - 107 mmol/L    CO2 31 21 - 32 mmol/L    Anion gap 8 mmol/L    Glucose 126 (H) 65 - 100 mg/dL    BUN 12 8 - 23 MG/DL    Creatinine 1.04 0.8 - 1.5 MG/DL    GFR est AA >60 >60 ml/min/1.73m2    GFR est non-AA >60 ml/min/1.73m2    Calcium 9.6 8.3 - 10.4 MG/DL   URINALYSIS W/ RFLX MICROSCOPIC    Collection Time: 10/09/18  9:15 AM   Result Value Ref Range    Color YELLOW      Appearance CLEAR      Specific gravity 1.024 (H) 1.001 - 1.023      pH (UA) 8.0 5.0 - 9.0      Protein 30 (A) NEG mg/dL    Glucose NEGATIVE  mg/dL    Ketone NEGATIVE  NEG mg/dL    Bilirubin NEGATIVE  NEG      Blood NEGATIVE  NEG      Urobilinogen 0.2 0.2 - 1.0 EU/dL    Nitrites NEGATIVE  NEG      Leukocyte Esterase NEGATIVE  NEG      WBC 0 0 /hpf    RBC 0-3 0 /hpf    Epithelial cells 0-3 0 /hpf    Bacteria 0 0 /hpf    Casts 0-3 0 /lpf   ALBUMIN    Collection Time: 10/09/18  9:15 AM Result Value Ref Range    Albumin 3.9 3.2 - 4.6 g/dL   HEMOGLOBIN A1C WITH EAG    Collection Time: 10/09/18  9:15 AM   Result Value Ref Range    Hemoglobin A1c 6.6 %    Est. average glucose 143 mg/dL         Problem List:  )  Patient Active Problem List   Diagnosis Code    Palpitations R00.2    Encounter to establish care Z76.89    Family history of coronary arteriosclerosis Z82.49    Essential hypertension with goal blood pressure less than 130/85 I10    Paroxysmal tachycardia (HCC) I47.9    Mixed hyperlipidemia E78.2    PAC (premature atrial contraction) I49.1       Total Joint Surgery Pre-Assessment Recommendations:           Risk for BRANDON. Recommend continuous saturation monitoring hours of sleep, during hospitalization. No PCP he was given list for [facility] by PAT.     Signed By: Grey Looney NP-C    October 9, 2018

## 2018-10-09 NOTE — PERIOP NOTES
Your patient recently had labs drawn during a hospital appointment due to an upcoming surgery. The results are attached. If you have any questions or concerns please reach out to your patient for a follow-up in your office. Please do not respond to this message as my mailbox is not monitored. You may call [phone] with questions or concerns. Recent Results (from the past 12 hour(s))   CBC WITH AUTOMATED DIFF    Collection Time: 10/09/18  9:15 AM   Result Value Ref Range    WBC 9.3 4.3 - 11.1 K/uL    RBC 5.44 4.23 - 5.6 M/uL    HGB 16.3 13.6 - 17.2 g/dL    HCT 48.1 41.1 - 50.3 %    MCV 88.4 79.6 - 97.8 FL    MCH 30.0 26.1 - 32.9 PG    MCHC 33.9 31.4 - 35.0 g/dL    RDW 12.9 %    PLATELET 171 676 - 575 K/uL    MPV 10.7 9.4 - 12.3 FL    ABSOLUTE NRBC 0.00 0.0 - 0.2 K/uL    DF AUTOMATED      NEUTROPHILS 69 43 - 78 %    LYMPHOCYTES 20 13 - 44 %    MONOCYTES 8 4.0 - 12.0 %    EOSINOPHILS 3 0.5 - 7.8 %    BASOPHILS 1 0.0 - 2.0 %    IMMATURE GRANULOCYTES 0 0.0 - 5.0 %    ABS. NEUTROPHILS 6.4 1.7 - 8.2 K/UL    ABS. LYMPHOCYTES 1.9 0.5 - 4.6 K/UL    ABS. MONOCYTES 0.7 0.1 - 1.3 K/UL    ABS. EOSINOPHILS 0.3 0.0 - 0.8 K/UL    ABS. BASOPHILS 0.1 0.0 - 0.2 K/UL    ABS. IMM.  GRANS. 0.0 0.0 - 0.5 K/UL   PROTHROMBIN TIME + INR    Collection Time: 10/09/18  9:15 AM   Result Value Ref Range    Prothrombin time 13.1 11.5 - 14.5 sec    INR 1.0     PTT    Collection Time: 10/09/18  9:15 AM   Result Value Ref Range    aPTT 27.6 23.2 - 12.9 SEC   METABOLIC PANEL, BASIC    Collection Time: 10/09/18  9:15 AM   Result Value Ref Range    Sodium 143 136 - 145 mmol/L    Potassium 4.0 3.5 - 5.1 mmol/L    Chloride 104 98 - 107 mmol/L    CO2 31 21 - 32 mmol/L    Anion gap 8 mmol/L    Glucose 126 (H) 65 - 100 mg/dL    BUN 12 8 - 23 MG/DL    Creatinine 1.04 0.8 - 1.5 MG/DL    GFR est AA >60 >60 ml/min/1.73m2    GFR est non-AA >60 ml/min/1.73m2    Calcium 9.6 8.3 - 10.4 MG/DL   URINALYSIS W/ RFLX MICROSCOPIC    Collection Time: 10/09/18  9:15 AM   Result Value Ref Range    Color YELLOW      Appearance CLEAR      Specific gravity 1.024 (H) 1.001 - 1.023      pH (UA) 8.0 5.0 - 9.0      Protein 30 (A) NEG mg/dL    Glucose NEGATIVE  mg/dL    Ketone NEGATIVE  NEG mg/dL    Bilirubin NEGATIVE  NEG      Blood NEGATIVE  NEG      Urobilinogen 0.2 0.2 - 1.0 EU/dL    Nitrites NEGATIVE  NEG      Leukocyte Esterase NEGATIVE  NEG      WBC 0 0 /hpf    RBC 0-3 0 /hpf    Epithelial cells 0-3 0 /hpf    Bacteria 0 0 /hpf    Casts 0-3 0 /lpf   ALBUMIN    Collection Time: 10/09/18  9:15 AM   Result Value Ref Range    Albumin 3.9 3.2 - 4.6 g/dL   HEMOGLOBIN A1C WITH EAG    Collection Time: 10/09/18  9:15 AM   Result Value Ref Range    Hemoglobin A1c 6.6 %    Est. average glucose 143 mg/dL

## 2018-10-09 NOTE — PERIOP NOTES
Patient verified name, , and surgery as listed in [facility] via hospital provided . Type 3 surgery, Joint camp assessment complete. Labs per surgeon: cbc, hgba1c,nicotine, albumin, ua, pt,ptt,bmp ; results (processing0  Labs per anesthesia protocol: included in surgeon's orders. EKG:in EMR dated 1/15/18 along with stress (18) and card note (18) - all within anesthesia protocols. Karli Jean NP notified pt does not have PCP. Information given to patient to find PCP. Beckie  requested for [employer] via  request form. Teaching materials given in [place] - pt confirms daughter in law speaks and reads English and can read them to him at home. Hibiclens and instructions to return bottle on DOS given per hospital policy. Nasal Swab collected per MD order and instructions for Mupirocin nasal ointment if required. Patient provided with handouts including Guide to Surgery, Pain Management, Hand Hygiene, Blood Transfusion Education, and [place] [facility] [facility] Brochure. Patient answered medical/surgical history questions at their best of ability. All prior to admission medications documented in [facility]. Original medication prescription bottle not visualized during patient appointment. Patient instructed to hold all vitamins 7 days prior to surgery and NSAIDS 5 days prior to surgery. Medications to be held: vitamins    Patient instructed to continue previous medications as prescribed prior to surgery and to take the following medications the day of surgery according to anesthesia guidelines with a small sip of water: none. Patient teach back successful and patient demonstrates knowledge of instruction.

## 2018-10-09 NOTE — PROGRESS NOTES
10/09/18 0900   Oxygen Therapy   O2 Sat (%) 96 %   Pulse via Oximetry 88 beats per minute   O2 Device Room air   Pre-Treatment   Breath Sounds Bilateral Clear   Pre FEV1 (liters) 2.2 liters   % Predicted 79   Incentive Spirometry Treatment   Actual Volume (ml) 2000 ml     Initial respiratory Assessment completed with pt. Pt was interviewed and evaluated in Joint camp prior to surgery. Patient ID:  Janny Gilmore  [id number]  31 y.o.  1955  Surgeon: Dr. Vaibahv Cohen  Date of Surgery: 10/17/2018  Procedure: Total Right Hip Arthroplasty  Primary Care Physician: None None  Specialists:                                  Pt instructed in the use of Incentive Spirometry. Pt instructed to bring Incentive Spirometer back on date of surgery & to start using Is upon return to pt room.     Pt taught proper cough technique    History of smoking:   NONE                                                       Quit date:           Secondhand smoke:NONE      Past procedures with Oxygen desaturation:NONE    Past Medical History:   Diagnosis Date    Arthritis     GERD (gastroesophageal reflux disease)     no meds     HTN (hypertension)     Hyperlipidemia     PAC (premature atrial contraction)     Palpitations                                                                                                                                                      Respiratory history:DENIES SOB                                                                  Respiratory meds:                                         FAMILY PRESENT:                                                                                                PAST SLEEP STUDY:                          NO  HX OF BRANDON:                                          NO                                     BRANDON assessment:                                               SLEEPS ON SIDE                                                             PHYSICAL EXAM   Body mass index is 27.46 kg/(m^2).    Visit Vitals    BP (!) 156/93 (BP 1 Location: Left arm, BP Patient Position: At rest;Sitting)    Pulse 84    Temp 97.6 °F (36.4 °C)    Resp 16    Ht 5' 5\" (1.651 m)    Wt 74.8 kg (165 lb)    SpO2 96%    BMI 27.46 kg/m2     Neck circumference:   40.5   cm    Loud snoring:        YES                             Witnessed apnea or wakening gasping or choking:,             DENIES,                                                                                        Awakens with headaches:                                                  DENIES    Morning or daytime tiredness/ sleepiness:                                                                                                          TIRED   Dry mouth or sore throat in morning:                                                                                       DENIES    Metcalf stage:  4    SACS score:13    STOP/BAN                              CPAP:                       NONE                 CONT SAT HS            Referrals:    Pt. Phone Number:

## 2018-10-09 NOTE — PROGRESS NOTES
Sonny Console  : 1946(12 y.o.) [address] at [place] [address], [address] [facility]  [phone]       Physical Therapy Prehab Plan of Treatment and Evaluation Summary:10/9/2018    ICD-10: Treatment Diagnosis:   · Pain in Right Hip (M25.551)  · Stiffness of Right Hip, Not elsewhere classified (M25.651)  · Difficulty in walking, Not elsewhere classified (R26.2)  · Other abnormalities of gait and mobility (R26.89)  Precautions/Allergies:   Review of patient's allergies indicates no known allergies. MEDICAL/REFERRING DIAGNOSIS:  Unilateral primary osteoarthritis, right hip [M16.11]  REFERRING PHYSICIAN: Shilo Aldridge MD  DATE OF SURGERY: 10/17/18   Assessment:   Comments:  Pain in right hip joint with decreased independence with functional mobility. Pt plans to return home following hospital stay.  present today to interpret for pt. Pt appears motivated and prepared for surgery. PROBLEM LIST (Impacting functional limitations):  Mr. Laura Smith presents with the following right lower extremity(s) problems:  1. Transfers  2. Gait  3. Strength  4. Range of Motion  5. Pain   INTERVENTIONS PLANNED:  1. Home Exercise Program  2. Educational Discussion     TREATMENT PLAN: Effective Dates: 10/9/2018 TO 10/9/2018. Frequency/Duration: Patient to continue to perform home exercise program at least twice per day up until his surgery. GOALS: (Goals have been discussed and agreed upon with patient.)  Discharge Goals: Time Frame: 1 Day  1. Patient will demonstrate independence with a home exercise program designed to increase strength, range of motion and pain control to minimize functional deficits and optimize patient for total joint replacement. Rehabilitation Potential For Stated Goals: Good  Regarding [employer] therapy, I certify that the treatment plan above will be carried out by a therapist or under their direction.   Thank you for this referral,  Fariha Levi CHANO Hart               HISTORY:   Present Symptoms:  Pain Intensity 1: 4  Pain Location 1: Hip  Pain Orientation 1: Right   History of Present Injury/Illness (Reason for Referral):  Medical/Referring Diagnosis: Unilateral primary osteoarthritis, right hip [M16.11]   Past Medical History/Comorbidities:   Mr. Marely Jackson  has a past medical history of Arthritis; GERD (gastroesophageal reflux disease); HTN (hypertension); Hyperlipidemia; PAC (premature atrial contraction); and Palpitations. He also has no past medical history of Aneurysm ([facility] [address] Asthma; Autoimmune disease ([facility] [address] [facility] CAD (coronary artery disease); Cancer ([facility] [address] [facility] Chronic kidney disease; Chronic obstructive pulmonary disease ([facility] [address] [facility] Chronic pain; Coagulation disorder ([facility] [address] [facility] Diabetes ([facility] [address] [facility] Endocarditis; Heart failure ([facility] [address] [facility] Ill-defined condition; Liver disease; Nicotine vapor product user; Non-nicotine vapor product user; Psychiatric disorder; PUD (peptic ulcer disease); Rheumatic fever; Seizures ([facility] [address] [facility] Sleep apnea; Stroke [facility]); Thromboembolus ([facility] [address] or Thyroid disease. Mr. Marely Jackson  has no past surgical history on file.   Social History/Living Environment:   Home Environment: Private residence  # Steps to Enter: 0  One/Two Story Residence: Two story, live on 1st floor  # of Interior Steps: 17  Living Alone: No  Support Systems: Other (comments) (son or daughter-in-law)  Patient Expects to be Discharged to[de-identified] Private residence  Tub or Shower Type: Tub/Shower combination  Work/Activity:  Employment requires heavy activity (climbing ladders)  Dominant Side:  RIGHT  Current Medications:  See Pre-assessment nursing note   Number of Personal Factors/Comorbidities that affect the Plan of Care: 0: LOW COMPLEXITY   EXAMINATION:   ADLs (Current Functional Status):   Ambulation:  [x] Independent  [] Walk Indoors Only  [] Walk Outdoors  [] Use Assistive Device  [] Use Wheelchair Only Dressing:  [x] Independent  Requires Assistance from Someone for:  [] Sock/Shoes  [] Pants  [] Everything   Bathing/Showering:   [x] Independent  [] Requires Assistance from Someone  [] Sponge Bath Only Household Activities:  [] Routine house and yard work  [x] Light Housework Only  [] None   Observation/Orthostatic Postural Assessment:   Within defined limits   ROM/Flexibility:   AROM: Generally decreased, functional                           Strength:   Strength: Generally decreased, functional                  Functional Mobility:    Coordination: Generally decreased, functional    Gait Description (WDL): Within defined limits  Stand to Sit: Independent  Sit to Stand: Independent  Distance (ft): 1000 Feet (ft)  Ambulation - Level of Assistance: Independent  Gait Abnormalities: Antalgic          Balance:    Sitting: Intact  Standing: Intact   Body Structures Involved:  1. Bones  2. Joints  3. Muscles  4. Ligaments Body Functions Affected:  1. Neuromusculoskeletal  2. Movement Related Activities and Participation Affected:  1. Mobility   Number of elements that affect the Plan of Care: 4+: HIGH COMPLEXITY   CLINICAL PRESENTATION:   Presentation: Stable and uncomplicated: LOW COMPLEXITY   CLINICAL DECISION MAKING:   Outcome Measure: Tool Used: Lower Extremity Functional Scale (LEFS)  Score:  Initial: 39/80 Most Recent: X/80 (Date: -- )   Interpretation of Score: 20 questions each scored on a 5 point scale with 0 representing \"extreme difficulty or unable to perform\" and 4 representing \"no difficulty\". The lower the score, the greater the functional disability. 80/80 represents no disability. Minimal detectable change is 9 points. Score 80 79-65 64-49 48-33 32-17 16-1 0   Modifier CH CI CJ CK CL CM CN     ?  Mobility - Walking and Moving Around:     - CURRENT STATUS: CK - 40%-59% impaired, limited or restricted    - GOAL STATUS: CK - 40%-59% impaired, limited or restricted    - D/C STATUS:  CK - 40%-59% impaired, limited or restricted  Medical Necessity:   · Mr. Eli Hammond is expected to optimize his lower extremity strength and ROM in preparation for joint replacement surgery. Reason for Services/Other Comments:  · Achieve baseline assesment of musculoskeletal system, functional mobility and home environment. , educate in PT HEP in preparation for surgery, educate in hospital plan of care. Use of outcome tool(s) and clinical judgement create a POC that gives a: Clear prediction of patient's progress: LOW COMPLEXITY   TREATMENT:   Treatment/Session Assessment:  Patient was instructed in PT- HEP to increase strength and ROM in LEs. Answered all questions. · Post session pain:  4  · Compliance with Program/Exercises: compliant most of the time.   Total Treatment Duration:  PT Patient Time In/Time Out  Time In: 0915  Time Out: [facility],

## 2018-10-09 NOTE — PERIOP NOTES
Labs done today within anesthesia protocols    Recent Results (from the past 12 hour(s))   CBC WITH AUTOMATED DIFF    Collection Time: 10/09/18  9:15 AM   Result Value Ref Range    WBC 9.3 4.3 - 11.1 K/uL    RBC 5.44 4.23 - 5.6 M/uL    HGB 16.3 13.6 - 17.2 g/dL    HCT 48.1 41.1 - 50.3 %    MCV 88.4 79.6 - 97.8 FL    MCH 30.0 26.1 - 32.9 PG    MCHC 33.9 31.4 - 35.0 g/dL    RDW 12.9 %    PLATELET 369 145 - 060 K/uL    MPV 10.7 9.4 - 12.3 FL    ABSOLUTE NRBC 0.00 0.0 - 0.2 K/uL    DF AUTOMATED      NEUTROPHILS 69 43 - 78 %    LYMPHOCYTES 20 13 - 44 %    MONOCYTES 8 4.0 - 12.0 %    EOSINOPHILS 3 0.5 - 7.8 %    BASOPHILS 1 0.0 - 2.0 %    IMMATURE GRANULOCYTES 0 0.0 - 5.0 %    ABS. NEUTROPHILS 6.4 1.7 - 8.2 K/UL    ABS. LYMPHOCYTES 1.9 0.5 - 4.6 K/UL    ABS. MONOCYTES 0.7 0.1 - 1.3 K/UL    ABS. EOSINOPHILS 0.3 0.0 - 0.8 K/UL    ABS. BASOPHILS 0.1 0.0 - 0.2 K/UL    ABS. IMM.  GRANS. 0.0 0.0 - 0.5 K/UL   PROTHROMBIN TIME + INR    Collection Time: 10/09/18  9:15 AM   Result Value Ref Range    Prothrombin time 13.1 11.5 - 14.5 sec    INR 1.0     PTT    Collection Time: 10/09/18  9:15 AM   Result Value Ref Range    aPTT 27.6 23.2 - 46.2 SEC   METABOLIC PANEL, BASIC    Collection Time: 10/09/18  9:15 AM   Result Value Ref Range    Sodium 143 136 - 145 mmol/L    Potassium 4.0 3.5 - 5.1 mmol/L    Chloride 104 98 - 107 mmol/L    CO2 31 21 - 32 mmol/L    Anion gap 8 mmol/L    Glucose 126 (H) 65 - 100 mg/dL    BUN 12 8 - 23 MG/DL    Creatinine 1.04 0.8 - 1.5 MG/DL    GFR est AA >60 >60 ml/min/1.73m2    GFR est non-AA >60 ml/min/1.73m2    Calcium 9.6 8.3 - 10.4 MG/DL   URINALYSIS W/ RFLX MICROSCOPIC    Collection Time: 10/09/18  9:15 AM   Result Value Ref Range    Color YELLOW      Appearance CLEAR      Specific gravity 1.024 (H) 1.001 - 1.023      pH (UA) 8.0 5.0 - 9.0      Protein 30 (A) NEG mg/dL    Glucose NEGATIVE  mg/dL    Ketone NEGATIVE  NEG mg/dL    Bilirubin NEGATIVE  NEG      Blood NEGATIVE  NEG      Urobilinogen 0.2 0.2 - 1.0 EU/dL    Nitrites NEGATIVE  NEG      Leukocyte Esterase NEGATIVE  NEG      WBC 0 0 /hpf    RBC 0-3 0 /hpf    Epithelial cells 0-3 0 /hpf    Bacteria 0 0 /hpf    Casts 0-3 0 /lpf   ALBUMIN    Collection Time: 10/09/18  9:15 AM   Result Value Ref Range    Albumin 3.9 3.2 - 4.6 g/dL   HEMOGLOBIN A1C WITH EAG    Collection Time: 10/09/18  9:15 AM   Result Value Ref Range    Hemoglobin A1c 6.6 %    Est. average glucose 143 mg/dL

## 2018-10-10 LAB
COTININE UR QL SCN: NEGATIVE NG/ML
DRUG SCREEN COMMENT:, 753798: NORMAL

## 2018-10-11 NOTE — PERIOP NOTES
Nicotine level - NEG    10/10/2018  4:38 PM - [place], [facility] [employer]   Component Results   Component Value Flag Ref Range Units Status   Cotinine Screen, urine NEGATIVE

## 2018-10-16 ENCOUNTER — ANESTHESIA EVENT (OUTPATIENT)
Dept: SURGERY | Age: 63
DRG: 470 | End: 2018-10-16
Payer: COMMERCIAL

## 2018-10-17 ENCOUNTER — APPOINTMENT (OUTPATIENT)
Dept: GENERAL RADIOLOGY | Age: 63
DRG: 470 | End: 2018-10-17
Attending: ORTHOPAEDIC SURGERY
Payer: COMMERCIAL

## 2018-10-17 ENCOUNTER — HOME HEALTH ADMISSION (OUTPATIENT)
Dept: HOME HEALTH SERVICES | Facility: HOME HEALTH | Age: 63
End: 2018-10-17
Payer: COMMERCIAL

## 2018-10-17 ENCOUNTER — HOSPITAL ENCOUNTER (INPATIENT)
Age: 63
LOS: 1 days | Discharge: HOME HEALTH | DRG: 470 | End: 2018-10-18
Attending: ORTHOPAEDIC SURGERY | Admitting: ORTHOPAEDIC SURGERY
Payer: COMMERCIAL

## 2018-10-17 ENCOUNTER — ANESTHESIA (OUTPATIENT)
Dept: SURGERY | Age: 63
DRG: 470 | End: 2018-10-17
Payer: COMMERCIAL

## 2018-10-17 DIAGNOSIS — M16.11 PRIMARY OSTEOARTHRITIS OF RIGHT HIP: Primary | ICD-10-CM

## 2018-10-17 PROBLEM — M16.9 OA (OSTEOARTHRITIS) OF HIP: Status: ACTIVE | Noted: 2018-10-17

## 2018-10-17 LAB
ABO + RH BLD: NORMAL
BLOOD GROUP ANTIBODIES SERPL: NORMAL
GLUCOSE BLD STRIP.AUTO-MCNC: 130 MG/DL (ref 65–100)
HGB BLD-MCNC: 14 G/DL (ref 13.6–17.2)
SPECIMEN EXP DATE BLD: NORMAL

## 2018-10-17 PROCEDURE — 77030013727 HC IRR FAN PULSVC ZIMM -B: Performed by: ORTHOPAEDIC SURGERY

## 2018-10-17 PROCEDURE — 74011250636 HC RX REV CODE- 250/636: Performed by: ORTHOPAEDIC SURGERY

## 2018-10-17 PROCEDURE — 77030003666 HC NDL SPINAL BD -A: Performed by: ORTHOPAEDIC SURGERY

## 2018-10-17 PROCEDURE — 74011250636 HC RX REV CODE- 250/636

## 2018-10-17 PROCEDURE — 77030002933 HC SUT MCRYL J&J -A: Performed by: ORTHOPAEDIC SURGERY

## 2018-10-17 PROCEDURE — 74011250637 HC RX REV CODE- 250/637: Performed by: ORTHOPAEDIC SURGERY

## 2018-10-17 PROCEDURE — 77030006777 HC BLD SAW OSC CNMD -B: Performed by: ORTHOPAEDIC SURGERY

## 2018-10-17 PROCEDURE — 36415 COLL VENOUS BLD VENIPUNCTURE: CPT

## 2018-10-17 PROCEDURE — 0SR903A REPLACEMENT OF RIGHT HIP JOINT WITH CERAMIC SYNTHETIC SUBSTITUTE, UNCEMENTED, OPEN APPROACH: ICD-10-PCS | Performed by: ORTHOPAEDIC SURGERY

## 2018-10-17 PROCEDURE — 97161 PT EVAL LOW COMPLEX 20 MIN: CPT

## 2018-10-17 PROCEDURE — 65270000029 HC RM PRIVATE

## 2018-10-17 PROCEDURE — 76010000162 HC OR TIME 1.5 TO 2 HR INTENSV-TIER 1: Performed by: ORTHOPAEDIC SURGERY

## 2018-10-17 PROCEDURE — 74011000302 HC RX REV CODE- 302: Performed by: ORTHOPAEDIC SURGERY

## 2018-10-17 PROCEDURE — 77030020782 HC GWN BAIR PAWS FLX 3M -B: Performed by: ANESTHESIOLOGY

## 2018-10-17 PROCEDURE — 77030034849: Performed by: ORTHOPAEDIC SURGERY

## 2018-10-17 PROCEDURE — 77030002922 HC SUT FBRWRE ARTH -B: Performed by: ORTHOPAEDIC SURGERY

## 2018-10-17 PROCEDURE — 77030006835 HC BLD SAW SAG STRY -B: Performed by: ORTHOPAEDIC SURGERY

## 2018-10-17 PROCEDURE — 76060000034 HC ANESTHESIA 1.5 TO 2 HR: Performed by: ORTHOPAEDIC SURGERY

## 2018-10-17 PROCEDURE — 77030039267 HC ADH SKN EXOFIN S2SG -B: Performed by: ORTHOPAEDIC SURGERY

## 2018-10-17 PROCEDURE — 72170 X-RAY EXAM OF PELVIS: CPT

## 2018-10-17 PROCEDURE — 94760 N-INVAS EAR/PLS OXIMETRY 1: CPT

## 2018-10-17 PROCEDURE — 77030011266 HC ELECTRD BLD INSL COVD -A: Performed by: ORTHOPAEDIC SURGERY

## 2018-10-17 PROCEDURE — 74011000250 HC RX REV CODE- 250: Performed by: ORTHOPAEDIC SURGERY

## 2018-10-17 PROCEDURE — 77030033067 HC SUT PDO STRATFX SPIR J&J -B: Performed by: ORTHOPAEDIC SURGERY

## 2018-10-17 PROCEDURE — 77030019940 HC BLNKT HYPOTHRM STRY -B: Performed by: ANESTHESIOLOGY

## 2018-10-17 PROCEDURE — 77030034479 HC ADH SKN CLSR PRINEO J&J -B: Performed by: ORTHOPAEDIC SURGERY

## 2018-10-17 PROCEDURE — 82962 GLUCOSE BLOOD TEST: CPT

## 2018-10-17 PROCEDURE — 94762 N-INVAS EAR/PLS OXIMTRY CONT: CPT

## 2018-10-17 PROCEDURE — 74011250636 HC RX REV CODE- 250/636: Performed by: ANESTHESIOLOGY

## 2018-10-17 PROCEDURE — 76210000006 HC OR PH I REC 0.5 TO 1 HR: Performed by: ORTHOPAEDIC SURGERY

## 2018-10-17 PROCEDURE — 86901 BLOOD TYPING SEROLOGIC RH(D): CPT

## 2018-10-17 PROCEDURE — 77030033138 HC SUT PGA STRATFX J&J -B: Performed by: ORTHOPAEDIC SURGERY

## 2018-10-17 PROCEDURE — 86580 TB INTRADERMAL TEST: CPT | Performed by: ORTHOPAEDIC SURGERY

## 2018-10-17 PROCEDURE — C1776 JOINT DEVICE (IMPLANTABLE): HCPCS | Performed by: ORTHOPAEDIC SURGERY

## 2018-10-17 PROCEDURE — 77030018074 HC RTVR SUT ARTH4 S&N -B: Performed by: ORTHOPAEDIC SURGERY

## 2018-10-17 PROCEDURE — 97165 OT EVAL LOW COMPLEX 30 MIN: CPT

## 2018-10-17 PROCEDURE — 77030018836 HC SOL IRR NACL ICUM -A: Performed by: ORTHOPAEDIC SURGERY

## 2018-10-17 PROCEDURE — 85018 HEMOGLOBIN: CPT

## 2018-10-17 PROCEDURE — 77030019557 HC ELECTRD VES SEAL MEDT -F: Performed by: ORTHOPAEDIC SURGERY

## 2018-10-17 PROCEDURE — 77030032490 HC SLV COMPR SCD KNE COVD -B

## 2018-10-17 PROCEDURE — 77030007880 HC KT SPN EPDRL BBMI -B: Performed by: ANESTHESIOLOGY

## 2018-10-17 PROCEDURE — 74011000250 HC RX REV CODE- 250

## 2018-10-17 PROCEDURE — 97110 THERAPEUTIC EXERCISES: CPT

## 2018-10-17 PROCEDURE — 97530 THERAPEUTIC ACTIVITIES: CPT

## 2018-10-17 PROCEDURE — 77030003665 HC NDL SPN BBMI -A: Performed by: ANESTHESIOLOGY

## 2018-10-17 DEVICE — LINER ACET SZ E ID36MM THK5.9MM 10DEG X3 FOR 54-56MM: Type: IMPLANTABLE DEVICE | Site: HIP | Status: FUNCTIONAL

## 2018-10-17 DEVICE — STEM FEM SZ 3 127D 30X102MM -- ACCOLADE II V40: Type: IMPLANTABLE DEVICE | Site: HIP | Status: FUNCTIONAL

## 2018-10-17 DEVICE — HEAD FEM DELT V40 +2.5MM 36MM -- V40 BIOLOX: Type: IMPLANTABLE DEVICE | Site: HIP | Status: FUNCTIONAL

## 2018-10-17 RX ORDER — NEOMYCIN AND POLYMYXIN B SULFATES 40; 200000 MG/ML; [USP'U]/ML
SOLUTION IRRIGATION AS NEEDED
Status: DISCONTINUED | OUTPATIENT
Start: 2018-10-17 | End: 2018-10-17 | Stop reason: HOSPADM

## 2018-10-17 RX ORDER — ALBUTEROL SULFATE 0.83 MG/ML
2.5 SOLUTION RESPIRATORY (INHALATION) AS NEEDED
Status: DISCONTINUED | OUTPATIENT
Start: 2018-10-17 | End: 2018-10-17 | Stop reason: HOSPADM

## 2018-10-17 RX ORDER — NALOXONE HYDROCHLORIDE 0.4 MG/ML
.2-.4 INJECTION, SOLUTION INTRAMUSCULAR; INTRAVENOUS; SUBCUTANEOUS
Status: DISCONTINUED | OUTPATIENT
Start: 2018-10-17 | End: 2018-10-18 | Stop reason: HOSPADM

## 2018-10-17 RX ORDER — KETOROLAC TROMETHAMINE 15 MG/ML
15 INJECTION, SOLUTION INTRAMUSCULAR; INTRAVENOUS EVERY 8 HOURS
Status: COMPLETED | OUTPATIENT
Start: 2018-10-17 | End: 2018-10-18

## 2018-10-17 RX ORDER — LIDOCAINE HYDROCHLORIDE 10 MG/ML
0.1 INJECTION, SOLUTION EPIDURAL; INFILTRATION; INTRACAUDAL; PERINEURAL AS NEEDED
Status: DISCONTINUED | OUTPATIENT
Start: 2018-10-17 | End: 2018-10-17 | Stop reason: HOSPADM

## 2018-10-17 RX ORDER — SODIUM CHLORIDE 0.9 % (FLUSH) 0.9 %
5-10 SYRINGE (ML) INJECTION AS NEEDED
Status: DISCONTINUED | OUTPATIENT
Start: 2018-10-17 | End: 2018-10-18 | Stop reason: HOSPADM

## 2018-10-17 RX ORDER — DEXAMETHASONE SODIUM PHOSPHATE 100 MG/10ML
INJECTION INTRAMUSCULAR; INTRAVENOUS AS NEEDED
Status: DISCONTINUED | OUTPATIENT
Start: 2018-10-17 | End: 2018-10-17 | Stop reason: HOSPADM

## 2018-10-17 RX ORDER — DIPHENHYDRAMINE HYDROCHLORIDE 50 MG/ML
12.5 INJECTION, SOLUTION INTRAMUSCULAR; INTRAVENOUS
Status: DISCONTINUED | OUTPATIENT
Start: 2018-10-17 | End: 2018-10-17 | Stop reason: HOSPADM

## 2018-10-17 RX ORDER — KETOROLAC TROMETHAMINE 30 MG/ML
INJECTION, SOLUTION INTRAMUSCULAR; INTRAVENOUS AS NEEDED
Status: DISCONTINUED | OUTPATIENT
Start: 2018-10-17 | End: 2018-10-17 | Stop reason: HOSPADM

## 2018-10-17 RX ORDER — ASPIRIN 325 MG
325 TABLET, DELAYED RELEASE (ENTERIC COATED) ORAL EVERY 12 HOURS
Qty: 60 TAB | Refills: 0 | Status: SHIPPED | OUTPATIENT
Start: 2018-10-17 | End: 2019-08-15

## 2018-10-17 RX ORDER — ASPIRIN 325 MG
325 TABLET, DELAYED RELEASE (ENTERIC COATED) ORAL EVERY 12 HOURS
Status: DISCONTINUED | OUTPATIENT
Start: 2018-10-17 | End: 2018-10-18 | Stop reason: HOSPADM

## 2018-10-17 RX ORDER — ACETAMINOPHEN 500 MG
1000 TABLET ORAL EVERY 6 HOURS
Qty: 120 TAB | Refills: 0 | Status: SHIPPED | OUTPATIENT
Start: 2018-10-18 | End: 2021-02-09

## 2018-10-17 RX ORDER — ONDANSETRON 8 MG/1
8 TABLET, ORALLY DISINTEGRATING ORAL
Qty: 30 TAB | Refills: 0 | Status: SHIPPED | OUTPATIENT
Start: 2018-10-17 | End: 2019-08-15

## 2018-10-17 RX ORDER — CYCLOBENZAPRINE HCL 10 MG
5 TABLET ORAL 3 TIMES DAILY
Status: DISCONTINUED | OUTPATIENT
Start: 2018-10-17 | End: 2018-10-18 | Stop reason: HOSPADM

## 2018-10-17 RX ORDER — MIDAZOLAM HYDROCHLORIDE 1 MG/ML
2 INJECTION, SOLUTION INTRAMUSCULAR; INTRAVENOUS
Status: DISCONTINUED | OUTPATIENT
Start: 2018-10-17 | End: 2018-10-17 | Stop reason: HOSPADM

## 2018-10-17 RX ORDER — DEXAMETHASONE SODIUM PHOSPHATE 100 MG/10ML
10 INJECTION INTRAMUSCULAR; INTRAVENOUS ONCE
Status: DISCONTINUED | OUTPATIENT
Start: 2018-10-18 | End: 2018-10-18 | Stop reason: HOSPADM

## 2018-10-17 RX ORDER — SODIUM CHLORIDE 9 MG/ML
100 INJECTION, SOLUTION INTRAVENOUS CONTINUOUS
Status: DISCONTINUED | OUTPATIENT
Start: 2018-10-17 | End: 2018-10-18 | Stop reason: HOSPADM

## 2018-10-17 RX ORDER — GABAPENTIN 100 MG/1
100 CAPSULE ORAL 2 TIMES DAILY
Qty: 30 CAP | Refills: 0 | Status: SHIPPED | OUTPATIENT
Start: 2018-10-17 | End: 2019-08-15

## 2018-10-17 RX ORDER — ACETAMINOPHEN 500 MG
1000 TABLET ORAL EVERY 6 HOURS
Status: DISCONTINUED | OUTPATIENT
Start: 2018-10-18 | End: 2018-10-18 | Stop reason: HOSPADM

## 2018-10-17 RX ORDER — ONDANSETRON 8 MG/1
8 TABLET, ORALLY DISINTEGRATING ORAL
Status: DISCONTINUED | OUTPATIENT
Start: 2018-10-17 | End: 2018-10-18 | Stop reason: HOSPADM

## 2018-10-17 RX ORDER — CYCLOBENZAPRINE HCL 10 MG
5 TABLET ORAL 3 TIMES DAILY
Qty: 30 TAB | Refills: 0 | Status: SHIPPED | OUTPATIENT
Start: 2018-10-17 | End: 2019-08-15

## 2018-10-17 RX ORDER — CELECOXIB 200 MG/1
200 CAPSULE ORAL EVERY 12 HOURS
Status: DISCONTINUED | OUTPATIENT
Start: 2018-10-17 | End: 2018-10-17 | Stop reason: ALTCHOICE

## 2018-10-17 RX ORDER — ZOLPIDEM TARTRATE 5 MG/1
5 TABLET ORAL
Status: DISCONTINUED | OUTPATIENT
Start: 2018-10-17 | End: 2018-10-18 | Stop reason: HOSPADM

## 2018-10-17 RX ORDER — AMOXICILLIN 250 MG
2 CAPSULE ORAL DAILY
Qty: 120 TAB | Refills: 0 | Status: SHIPPED | OUTPATIENT
Start: 2018-10-18

## 2018-10-17 RX ORDER — OXYCODONE HYDROCHLORIDE 5 MG/1
10 TABLET ORAL
Status: DISCONTINUED | OUTPATIENT
Start: 2018-10-17 | End: 2018-10-17 | Stop reason: HOSPADM

## 2018-10-17 RX ORDER — HYDROMORPHONE HYDROCHLORIDE 2 MG/ML
1 INJECTION, SOLUTION INTRAMUSCULAR; INTRAVENOUS; SUBCUTANEOUS
Status: DISCONTINUED | OUTPATIENT
Start: 2018-10-17 | End: 2018-10-18 | Stop reason: HOSPADM

## 2018-10-17 RX ORDER — HYDROMORPHONE HYDROCHLORIDE 2 MG/ML
0.5 INJECTION, SOLUTION INTRAMUSCULAR; INTRAVENOUS; SUBCUTANEOUS
Status: DISCONTINUED | OUTPATIENT
Start: 2018-10-17 | End: 2018-10-17 | Stop reason: HOSPADM

## 2018-10-17 RX ORDER — HYDROMORPHONE HYDROCHLORIDE 2 MG/1
2 TABLET ORAL
Status: DISCONTINUED | OUTPATIENT
Start: 2018-10-17 | End: 2018-10-18 | Stop reason: HOSPADM

## 2018-10-17 RX ORDER — ZOLPIDEM TARTRATE 5 MG/1
5 TABLET ORAL
Qty: 30 TAB | Refills: 0 | Status: SHIPPED | OUTPATIENT
Start: 2018-10-17 | End: 2019-08-15

## 2018-10-17 RX ORDER — LIDOCAINE HYDROCHLORIDE 10 MG/ML
0.1 INJECTION INFILTRATION; PERINEURAL AS NEEDED
Status: DISCONTINUED | OUTPATIENT
Start: 2018-10-17 | End: 2018-10-17 | Stop reason: SDUPTHER

## 2018-10-17 RX ORDER — TRANEXAMIC ACID 100 MG/ML
INJECTION, SOLUTION INTRAVENOUS AS NEEDED
Status: DISCONTINUED | OUTPATIENT
Start: 2018-10-17 | End: 2018-10-17 | Stop reason: HOSPADM

## 2018-10-17 RX ORDER — FENTANYL CITRATE 50 UG/ML
100 INJECTION, SOLUTION INTRAMUSCULAR; INTRAVENOUS ONCE
Status: DISCONTINUED | OUTPATIENT
Start: 2018-10-17 | End: 2018-10-17 | Stop reason: HOSPADM

## 2018-10-17 RX ORDER — SODIUM CHLORIDE, SODIUM LACTATE, POTASSIUM CHLORIDE, CALCIUM CHLORIDE 600; 310; 30; 20 MG/100ML; MG/100ML; MG/100ML; MG/100ML
100 INJECTION, SOLUTION INTRAVENOUS CONTINUOUS
Status: DISCONTINUED | OUTPATIENT
Start: 2018-10-17 | End: 2018-10-17 | Stop reason: HOSPADM

## 2018-10-17 RX ORDER — ONDANSETRON 2 MG/ML
INJECTION INTRAMUSCULAR; INTRAVENOUS AS NEEDED
Status: DISCONTINUED | OUTPATIENT
Start: 2018-10-17 | End: 2018-10-17 | Stop reason: HOSPADM

## 2018-10-17 RX ORDER — LIDOCAINE HYDROCHLORIDE 10 MG/ML
0.1 INJECTION, SOLUTION EPIDURAL; INFILTRATION; INTRACAUDAL; PERINEURAL AS NEEDED
Status: DISCONTINUED | OUTPATIENT
Start: 2018-10-17 | End: 2018-10-17 | Stop reason: SDUPTHER

## 2018-10-17 RX ORDER — MIDAZOLAM HYDROCHLORIDE 1 MG/ML
2 INJECTION, SOLUTION INTRAMUSCULAR; INTRAVENOUS ONCE
Status: DISCONTINUED | OUTPATIENT
Start: 2018-10-17 | End: 2018-10-17 | Stop reason: HOSPADM

## 2018-10-17 RX ORDER — TRANEXAMIC ACID 650 1/1
1300 TABLET ORAL
Status: COMPLETED | OUTPATIENT
Start: 2018-10-17 | End: 2018-10-17

## 2018-10-17 RX ORDER — ONDANSETRON 2 MG/ML
4 INJECTION INTRAMUSCULAR; INTRAVENOUS ONCE
Status: DISCONTINUED | OUTPATIENT
Start: 2018-10-17 | End: 2018-10-17 | Stop reason: HOSPADM

## 2018-10-17 RX ORDER — SODIUM CHLORIDE 0.9 % (FLUSH) 0.9 %
5-10 SYRINGE (ML) INJECTION EVERY 8 HOURS
Status: DISCONTINUED | OUTPATIENT
Start: 2018-10-17 | End: 2018-10-18 | Stop reason: HOSPADM

## 2018-10-17 RX ORDER — CEFAZOLIN SODIUM/WATER 2 G/20 ML
2 SYRINGE (ML) INTRAVENOUS EVERY 8 HOURS
Status: COMPLETED | OUTPATIENT
Start: 2018-10-17 | End: 2018-10-18

## 2018-10-17 RX ORDER — CEFAZOLIN SODIUM/WATER 2 G/20 ML
2 SYRINGE (ML) INTRAVENOUS ONCE
Status: COMPLETED | OUTPATIENT
Start: 2018-10-17 | End: 2018-10-17

## 2018-10-17 RX ORDER — HYDROMORPHONE HYDROCHLORIDE 2 MG/1
2 TABLET ORAL
Qty: 42 TAB | Refills: 0 | Status: SHIPPED | OUTPATIENT
Start: 2018-10-17 | End: 2019-08-15

## 2018-10-17 RX ORDER — DIPHENHYDRAMINE HCL 25 MG
25 CAPSULE ORAL
Status: DISCONTINUED | OUTPATIENT
Start: 2018-10-17 | End: 2018-10-18 | Stop reason: HOSPADM

## 2018-10-17 RX ORDER — ROPIVACAINE HYDROCHLORIDE 2 MG/ML
INJECTION, SOLUTION EPIDURAL; INFILTRATION; PERINEURAL AS NEEDED
Status: DISCONTINUED | OUTPATIENT
Start: 2018-10-17 | End: 2018-10-17 | Stop reason: HOSPADM

## 2018-10-17 RX ORDER — MIDAZOLAM HYDROCHLORIDE 1 MG/ML
INJECTION, SOLUTION INTRAMUSCULAR; INTRAVENOUS AS NEEDED
Status: DISCONTINUED | OUTPATIENT
Start: 2018-10-17 | End: 2018-10-17 | Stop reason: HOSPADM

## 2018-10-17 RX ORDER — OXYCODONE HYDROCHLORIDE 5 MG/1
5 TABLET ORAL
Status: DISCONTINUED | OUTPATIENT
Start: 2018-10-17 | End: 2018-10-17 | Stop reason: HOSPADM

## 2018-10-17 RX ORDER — EPHEDRINE SULFATE 50 MG/ML
INJECTION, SOLUTION INTRAVENOUS AS NEEDED
Status: DISCONTINUED | OUTPATIENT
Start: 2018-10-17 | End: 2018-10-17 | Stop reason: HOSPADM

## 2018-10-17 RX ORDER — ACETAMINOPHEN 10 MG/ML
1000 INJECTION, SOLUTION INTRAVENOUS ONCE
Status: COMPLETED | OUTPATIENT
Start: 2018-10-17 | End: 2018-10-17

## 2018-10-17 RX ORDER — FENTANYL CITRATE 50 UG/ML
INJECTION, SOLUTION INTRAMUSCULAR; INTRAVENOUS AS NEEDED
Status: DISCONTINUED | OUTPATIENT
Start: 2018-10-17 | End: 2018-10-17 | Stop reason: HOSPADM

## 2018-10-17 RX ORDER — AMOXICILLIN 250 MG
2 CAPSULE ORAL DAILY
Status: DISCONTINUED | OUTPATIENT
Start: 2018-10-18 | End: 2018-10-18 | Stop reason: HOSPADM

## 2018-10-17 RX ORDER — MIDAZOLAM HYDROCHLORIDE 1 MG/ML
2 INJECTION, SOLUTION INTRAMUSCULAR; INTRAVENOUS
Status: DISCONTINUED | OUTPATIENT
Start: 2018-10-17 | End: 2018-10-17 | Stop reason: SDUPTHER

## 2018-10-17 RX ORDER — GABAPENTIN 100 MG/1
100 CAPSULE ORAL 2 TIMES DAILY
Status: DISCONTINUED | OUTPATIENT
Start: 2018-10-17 | End: 2018-10-18 | Stop reason: HOSPADM

## 2018-10-17 RX ORDER — PROPOFOL 10 MG/ML
INJECTION, EMULSION INTRAVENOUS
Status: DISCONTINUED | OUTPATIENT
Start: 2018-10-17 | End: 2018-10-17 | Stop reason: HOSPADM

## 2018-10-17 RX ORDER — NALOXONE HYDROCHLORIDE 0.4 MG/ML
0.1 INJECTION, SOLUTION INTRAMUSCULAR; INTRAVENOUS; SUBCUTANEOUS AS NEEDED
Status: DISCONTINUED | OUTPATIENT
Start: 2018-10-17 | End: 2018-10-17 | Stop reason: HOSPADM

## 2018-10-17 RX ORDER — MIDAZOLAM HYDROCHLORIDE 1 MG/ML
2 INJECTION, SOLUTION INTRAMUSCULAR; INTRAVENOUS ONCE
Status: DISCONTINUED | OUTPATIENT
Start: 2018-10-17 | End: 2018-10-17 | Stop reason: SDUPTHER

## 2018-10-17 RX ADMIN — TRANEXAMIC ACID 1300 MG: 650 TABLET, FILM COATED ORAL at 17:27

## 2018-10-17 RX ADMIN — FENTANYL CITRATE 50 MCG: 50 INJECTION, SOLUTION INTRAMUSCULAR; INTRAVENOUS at 07:36

## 2018-10-17 RX ADMIN — SODIUM CHLORIDE, SODIUM LACTATE, POTASSIUM CHLORIDE, AND CALCIUM CHLORIDE 100 ML/HR: 600; 310; 30; 20 INJECTION, SOLUTION INTRAVENOUS at 06:24

## 2018-10-17 RX ADMIN — PROPOFOL 25 MCG/KG/MIN: 10 INJECTION, EMULSION INTRAVENOUS at 08:00

## 2018-10-17 RX ADMIN — FENTANYL CITRATE 50 MCG: 50 INJECTION, SOLUTION INTRAMUSCULAR; INTRAVENOUS at 07:45

## 2018-10-17 RX ADMIN — ACETAMINOPHEN 1000 MG: 10 INJECTION, SOLUTION INTRAVENOUS at 17:28

## 2018-10-17 RX ADMIN — GABAPENTIN 100 MG: 100 CAPSULE ORAL at 17:28

## 2018-10-17 RX ADMIN — TRANEXAMIC ACID 1 G: 100 INJECTION, SOLUTION INTRAVENOUS at 08:51

## 2018-10-17 RX ADMIN — EPHEDRINE SULFATE 10 MG: 50 INJECTION, SOLUTION INTRAVENOUS at 08:03

## 2018-10-17 RX ADMIN — TRANEXAMIC ACID 1 G: 100 INJECTION, SOLUTION INTRAVENOUS at 07:42

## 2018-10-17 RX ADMIN — SODIUM CHLORIDE, SODIUM LACTATE, POTASSIUM CHLORIDE, AND CALCIUM CHLORIDE: 600; 310; 30; 20 INJECTION, SOLUTION INTRAVENOUS at 08:32

## 2018-10-17 RX ADMIN — CYCLOBENZAPRINE HYDROCHLORIDE 5 MG: 10 TABLET, FILM COATED ORAL at 17:27

## 2018-10-17 RX ADMIN — EPHEDRINE SULFATE 10 MG: 50 INJECTION, SOLUTION INTRAVENOUS at 08:40

## 2018-10-17 RX ADMIN — Medication 1 AMPULE: at 21:58

## 2018-10-17 RX ADMIN — ASPIRIN 325 MG: 325 TABLET, DELAYED RELEASE ORAL at 21:58

## 2018-10-17 RX ADMIN — EPHEDRINE SULFATE 10 MG: 50 INJECTION, SOLUTION INTRAVENOUS at 08:33

## 2018-10-17 RX ADMIN — TRANEXAMIC ACID 1300 MG: 650 TABLET, FILM COATED ORAL at 13:28

## 2018-10-17 RX ADMIN — ONDANSETRON 4 MG: 2 INJECTION INTRAMUSCULAR; INTRAVENOUS at 09:08

## 2018-10-17 RX ADMIN — Medication 3 AMPULE: at 06:19

## 2018-10-17 RX ADMIN — TUBERCULIN PURIFIED PROTEIN DERIVATIVE 5 UNITS: 5 INJECTION, SOLUTION INTRADERMAL at 06:20

## 2018-10-17 RX ADMIN — Medication 2 G: at 07:43

## 2018-10-17 RX ADMIN — Medication 2 G: at 17:28

## 2018-10-17 RX ADMIN — MIDAZOLAM HYDROCHLORIDE 1 MG: 1 INJECTION, SOLUTION INTRAMUSCULAR; INTRAVENOUS at 07:45

## 2018-10-17 RX ADMIN — MULTIPLE VITAMINS W/ MINERALS TAB 1 TABLET: TAB at 13:28

## 2018-10-17 RX ADMIN — DEXAMETHASONE SODIUM PHOSPHATE 10 MG: 100 INJECTION INTRAMUSCULAR; INTRAVENOUS at 09:08

## 2018-10-17 RX ADMIN — KETOROLAC TROMETHAMINE 15 MG: 15 INJECTION, SOLUTION INTRAMUSCULAR; INTRAVENOUS at 21:58

## 2018-10-17 RX ADMIN — CYCLOBENZAPRINE HYDROCHLORIDE 5 MG: 10 TABLET, FILM COATED ORAL at 21:58

## 2018-10-17 RX ADMIN — LIDOCAINE HYDROCHLORIDE 0.1 ML: 10 INJECTION, SOLUTION INFILTRATION; PERINEURAL at 06:20

## 2018-10-17 RX ADMIN — MIDAZOLAM HYDROCHLORIDE 1 MG: 1 INJECTION, SOLUTION INTRAMUSCULAR; INTRAVENOUS at 07:36

## 2018-10-17 RX ADMIN — KETOROLAC TROMETHAMINE 15 MG: 15 INJECTION, SOLUTION INTRAMUSCULAR; INTRAVENOUS at 13:28

## 2018-10-17 NOTE — PROGRESS NOTES
TRANSFER - IN REPORT:    Verbal report received from Chuckie Mock on Roberth Carbajal  being received from PACU for routine post - op      Report consisted of patients Situation, Background, Assessment and   Recommendations(SBAR). Information from the following report(s) SBAR and Intake/Output was reviewed with the receiving nurse. Opportunity for questions and clarification was provided. Assessment completed upon patients arrival to unit and care assumed. Pt speaks [place], has  and daughter in law interpreting. Oriented to room, bed controls, and how to order meals. No complaints. LEs remain numb. SCDs to LEs. Yellow gripper socks to feet and instructed not to get up without staff to assist. Instructed to use IS and that therapy would be getting him up later.

## 2018-10-17 NOTE — PERIOP NOTES
TRANSFER - IN REPORT:    Verbal report received from [facility] rn on [place]  being received from 3rd floor ortho for routine progression of care      Report consisted of patients Situation, Background, Assessment and   Recommendations(SBAR). Information from the following report(s) SBAR and MAR was reviewed with the receiving nurse. Opportunity for questions and clarification was provided. Assessment completed upon patients arrival to unit and care assumed.

## 2018-10-17 NOTE — H&P
[address] [facility]  Pre Operative History and Physical Exam    Patient ID:  Sean Mcadams  [id number]  69 y.o.  1955    Today: October 17, 2018       CC:  Right hip pain    HPI:   The patient has end stage arthritis of the right hip. The patient was evaluated and examined in the office prior to today and was found to have a history on physical exam consistent with intra-articular pathology of the right hip. Patient complains of anterior groin pain predominately. Pain occurs during activity. Patient has difficulty putting on socks/shoes and has notable pain when getting up from a chair and getting out of a car. Patient does not complain of significant lateral hip pain or radicular pain down the leg. There have been no changes to the patient's orthopedic condition since the last office visit    Past Medical History:  Past Medical History:   Diagnosis Date    Arthritis     GERD (gastroesophageal reflux disease)     no meds     HTN (hypertension)     Hyperlipidemia     PAC (premature atrial contraction)     Palpitations        Past Surgical History:  History reviewed. No pertinent surgical history. Medications:     Prior to Admission medications    Medication Sig Start Date End Date Taking? Authorizing Provider   MULTIVITAMIN PO Take  by mouth daily. Yes Provider, Historical   beta-carotene,A,-vits C and E (ANTIOXIDANT PO) Take  by mouth daily. Yes Provider, Historical   OTHER,NON-FORMULARY, Calcium, magnesium, zinc and vitamin D3 OTC supplement. 1 tab by mouth daily. Yes Provider, Historical   amLODIPine-benazepril (LOTREL) 2.5-10 mg per capsule Take 1 Cap by mouth daily. 7/5/18  Yes Luis Love MD       Family History:   History reviewed. No pertinent family history.     Social History:      Social History     Tobacco Use    Smoking status: Never Smoker    Smokeless tobacco: Never Used   Substance Use Topics    Alcohol use: Yes     Comment: maybe 1 times per week Allergies: Allergies   Allergen Reactions    Other Plant, Animal, Environmental Anaphylaxis     SOB          Vitals:      Visit Vitals  /87 (BP 1 Location: Right arm, BP Patient Position: At rest)   Pulse 81   Temp 98.1 °F (36.7 °C)   Resp 16   Ht 5' 5\" (1.651 m)   Wt 74.8 kg (165 lb)   SpO2 96%   BMI 27.46 kg/m²        Objective:         General: No Acute distress                   HEENT: Normocephalic/atramatic                   Lungs:  Breathing non-labored                   Heart:   RRR                    Abdomen: soft       Extremities:  Pain with ROM of the right hip which manifests as anterior groin pain. There is decreased internal and external rotation of the affected hip. No significant pain with palpation over the greater trochanteric bursa. No radicular pain with straight leg raise. Patient walks with and antalgic gait. Distally patient has no neurologic deficit. Patient Active Problem List   Diagnosis Code    Palpitations R00.2    Encounter to establish care Z76.89    Family history of coronary arteriosclerosis Z82.49    Essential hypertension with goal blood pressure less than 130/85 I10    Paroxysmal tachycardia (HCC) I47.9    Mixed hyperlipidemia E78.2    PAC (premature atrial contraction) I49.1    OA (osteoarthritis) of hip M16.9       Assessment:   1. Arthritis of the Right hip    Plan:   The patient has failed previous conservative treatment for this condition. The patient has pain in the left hip which causes daily symptoms which affects the patient's activities of daily living and quality of life.  The risks, benefits, alternatives and possible complications of right total hip arthroplasty have been discussed with the patient including but not limited to infection, bleeding, damage to nerves and blood vessels with particular attention given to risk of damage of the femoral, obturator, lateral femoral cutaneous, superior gluteal, inferior gluteal, and sciatic nerve, risk of dislocation, fracture both intra-op and post-op, limb length inequality, polyethylene wear, implant loosening, risk for continued pain, and risk for revision surgery secondary to but not limited to all of these discussed risks. Further we discussed risk of venous thrombo-embolism including deep vein thrombosis and pulmonary embolism despite being on prophylaxis. We have also previously discussed the potential of morbidity and mortality associated with, but not limited to, the actual surgical procedure, anesthesia, prior medical conditions, and/or the administration of medications perioperatively. I have made no guarantees to the patient regarding outcomes and the patient has voiced understanding of that. The patient has been given the opportunity to ask questions all of which have been answered and the patient wishes to proceed. The patient will be admitted the day of surgery for right total hip replacement.         Signed By: Shaina Camargo MD  October 17, 2018

## 2018-10-17 NOTE — ANESTHESIA POSTPROCEDURE EVALUATION
Procedure(s): RIGHT HIP ARTHROPLASTY TOTAL / NICKIE  POLISH . Anesthesia Post Evaluation Multimodal analgesia: multimodal analgesia used between 6 hours prior to anesthesia start to PACU discharge Patient location during evaluation: PACU Patient participation: complete - patient participated Level of consciousness: awake and awake and alert Pain management: adequate Airway patency: patent Anesthetic complications: no 
Cardiovascular status: acceptable Respiratory status: acceptable Hydration status: acceptable Visit Vitals /56 (BP 1 Location: Right arm, BP Patient Position: At rest) Pulse 68 Temp 36.7 °C (98.1 °F) Resp 16 Ht 5' 5\" (1.651 m) Wt 74.8 kg (165 lb) SpO2 99% BMI 27.46 kg/m²

## 2018-10-17 NOTE — ANESTHESIA PROCEDURE NOTES
Spinal Block Start time: 10/17/2018 7:37 AM 
End time: 10/17/2018 7:41 AM 
Performed by: Merri Essex, MD 
Authorized by: Merri Essex, MD  
 
Pre-procedure: Indications: primary anesthetic  Preanesthetic Checklist: patient identified, risks and benefits discussed, anesthesia consent, site marked, patient being monitored and timeout performed Timeout Time: 07:37 Spinal Block:  
Patient Position:  Seated Prep Region:  Lumbar Prep: chlorhexidine Location:  L3-4 Technique:  Single shot Local:  Lidocaine 1% Local Dose (mL):  3 Needle:  
Needle Type:  Pencan Needle Gauge:  25 G Attempts:  1 Events: CSF confirmed, no blood with aspiration and no paresthesia Assessment: 
Insertion:  Uncomplicated Patient tolerance:  Patient tolerated the procedure well with no immediate complications Anesthesiology Spinal Procedure Note Risks and benefits were discussed with patient and plans are to proceed. Patient was placed in the sitting position. The back was prepped at the lumbar region with Chlorhexidine. 1% xylocaine was used as local at L3-L4. A  25g Pencan was passed 1 times. Easy aspiration of CSF was noted. 15 mg hyperbaric Bupivacaine  was injected.

## 2018-10-17 NOTE — PERIOP NOTES
TRANSFER - OUT REPORT:    Verbal report given to [facility], RN (name) on Santo Zavala  being transferred to room  336  (unit) for routine post - op       Report consisted of patients Situation, Background, Assessment and   Recommendations(SBAR). Information from the following report(s) SBAR, Kardex, OR Summary, Intake/Output and MAR was reviewed with the receiving nurse. Lines:   Peripheral IV 15/28/57 Left Cephalic (Active)   Site Assessment Clean, dry, & intact 10/17/2018  9:20 AM   Phlebitis Assessment 0 10/17/2018  9:20 AM   Infiltration Assessment 0 10/17/2018  9:20 AM   Dressing Status Clean, dry, & intact 10/17/2018  9:20 AM   Dressing Type Tape;Transparent 10/17/2018  9:20 AM   Hub Color/Line Status Infusing;Patent 10/17/2018  9:20 AM   Action Taken Blood drawn 10/17/2018  6:17 AM        Opportunity for questions and clarification was provided.       Patient transported with:   O2 @ 2 liters  Tech

## 2018-10-17 NOTE — PERIOP NOTES
LE- Daughter in law, Martinez Izquierdo, and  from [facility] here to interpret this AM for patient who speaks [place].

## 2018-10-17 NOTE — PROGRESS NOTES
10/17/18 1241   Oxygen Therapy   O2 Sat (%) 97 %   Pulse via Oximetry 87 beats per minute   O2 Device Nasal cannula   O2 Flow Rate (L/min) 2 l/min  (pt. weaned to RA.)   Incentive Spirometry Treatment   Actual Volume (ml) 2000 ml   Number of Attempts 1   Patient achieved    2000  Ml/sec on IS. Patient encouraged to do every hour while awake-patient agreed and demonstrated. No shortness of breath or distress noted. BS are clear b/l. [facility] notes reviewed- continuous sat #13 ordered HS.

## 2018-10-17 NOTE — PROGRESS NOTES
Moving feet well. Sensation starting to return to LEs. No c/o pain. Daughter in law remains in room.

## 2018-10-17 NOTE — PERIOP NOTES
TRANSFER - OUT REPORT:    Verbal report given to Yani Escobedo RN on Pari Boone  being transferred to preop holding for routine progression of care       Report consisted of patients Situation, Background, Assessment and   Recommendations(SBAR). Information from the following report(s) SBAR, MAR and Recent Results was reviewed with the receiving nurse. Lines:   Peripheral IV 26/29/88 Left Cephalic (Active)   Site Assessment Clean, dry, & intact 10/17/2018  6:17 AM   Phlebitis Assessment 0 10/17/2018  6:17 AM   Infiltration Assessment 0 10/17/2018  6:17 AM   Dressing Status Clean, dry, & intact 10/17/2018  6:17 AM   Dressing Type Transparent;Tape 10/17/2018  6:17 AM   Hub Color/Line Status Pink; Infusing 10/17/2018  6:17 AM   Action Taken Blood drawn 10/17/2018  6:17 AM        Opportunity for questions and clarification was provided.       Patient transported with:   [employer]

## 2018-10-17 NOTE — PERIOP NOTES
500 ML 0.9% NACL (LOT#[id number]  EXP:06/01/2021) MIXED WITH 17. 5ML 10% POVIDONE-IODINE (LOT# [id number]  VWB:17/7108) FOR LAVAGE TO RIGHT HIP

## 2018-10-17 NOTE — OP NOTES
Total Hip Procedure Note    Brook Gonzalez,  [id number],  1955    Pre-operative Diagnosis:  Localized osteoarthrosis of right hip [M16.11]    Post-operative Diagnosis: Localized osteoarthrosis of right hip [M16.11]    Procedure: Right Total Hip Arthroplasty    BMI: Body mass index is 27.46 kg/m². [facility] Location: [address] [place] [address]    Surgeon: Yady Barron MD    Assistant: Nataliya Fallon CFA    Anesthesia: Spinal     Complications: None    EBL: 200cc    Drains: None      Intra-op Findings: Pre-operatively leg lengths were assessed using preop Xrays and with the patient in the lateral decubitus position and operative leg was felt to be similar in length compared to the contralateral leg. The operative hip showed notable cartilage loss of both the femoral head and acetabulum. No intra-operative periprosthetic fracture was encountered. Sciatic nerve was noted to be intact at the end of the procedure. We measured the distance of our resected femoral head/neck from the cut surface to the center of the femoral head to be approximately 35mm. The overall length replaced with the implanted head/neck was 32.5mm. Intra-operatively we felt that we restored the patients leg lengths to equal lengths using the method described later. Postop with the patient supine we assessed leg lengths and felt they were similar. Patient condition at completion of Procedure: Stable    Implants:   Implant Name Type Inv.  Item Serial No.  Lot No. LRB No. Used   CLUSTERHOLE ACETABULAR SHELL [id number]   [id number] [employer] [id number] Right 1   LINER ACET TRIDENT 10 DEG SZ E --  - [id number]  LINER ACET TRIDENT 10 DEG SZ E --  [id number] [employer] [facility] [id number] Right 1   HEAD FEM DELT V40 +2.5MM 36MM -- V40 BIOLOX - [id number]  HEAD FEM DELT V40 +2.5MM 36MM -- V40 BIOLOX [id number] [employer] [facility] [id number] Right 1   STEM FEM SZ 3 127D [id number] -- ACCOLADE II V40 - [id number]  STEM FEM SZ 3 127D [id number] -- ACCOLADE II V40 [id number] [facility] [id number] Right 1       Description of Procedure    The complexity of the total joint surgery requires the use of a first assistant for positioning, retraction and assistance in closure. Claire Hudson was brought to the operating room. Patient was transferred from the stretcher to OR bed. Spinal anesthetic was induced. Aguilar catheter was placed. IV antibiotics were administered per protocol. Claire Hudson was positioned in the lateral decubitus position and the pelvis/torso stabilized with posts. The limb was prepped and draped in the usual sterile fashion. A time out identifying the patient, procedure, operative side and surgeon was administered and charted by the OR Nurse. Prior to incision one gram of TXA was given intravenously. A standard posterior approach was utilized to expose the hip. The incision was carried through the subcutaneous tissue and underlying fascia with hemostasis obtained using the bovie cauterization and Aquamantys. A Charmley retractor was inserted. We resected any redundent bursal tissue off the external rotators. We were able to identify the piriformis tendon. The short external rotators and capsule were dissected off the posterior femur as a single layer just superior to the piriformis tendon. The sciatic nerve was palpated and protected during dissection. The femoral head was dislocated. The articular surface revealed loss of cartilage, exposed bone and bone spurring. The neck was osteotomized approximately 1cm above the top of the lesser trochanter. We were careful to protect the greater trochanter during osteotomy and protect it from iatrogenic injury. Acetabular retractors were placed both anterior and posterior just superficial to the acetabular labrum. Remaining acetabular labrum was resected and any soft tissue was removed from the acetabulum including any tissue within the codyloid fossa.  The acetabular surface revealed loss of cartilage with exposed bone. The acetabulum was sequentially reamed noting proper anteversion and inclination during reaming. The transverse acetabular ligament was used as the primary anatomic landmark to determine version and inclination. The acetabulum was sized to a 54 mm acetabular component. The prepared acetabulum was irrigated of any residual reamings and soft tissue. The permanent acetabular component was impacted into place to achieve appropriate horizontal tilt, anteversion, bone coverage and stability. We visualize that the acetabular component was fully seated. A trial liner was impacted into position. We did not have to excise overhanging osteophytes anterior and posterior  in order to minimize the risk of impingement. We then turned our attention to the proximal femur. A 2-prong proximal femoral retractor was placed. We gained access to the proximal femur using a  and femoral canal finder. The canal was prepared with appropriate lateralization in which we used the initial smaller broaches to remove lateral bone to avoid varus placement of the femoral component. The canal was then broached progressively. The broach was positioned with the appropriate anatomic femoral anteversion. We broached up to a size 3 Accolade II stem. A calcar planar was used. A trial reduction with a size #3 127 degree Accolade II stem with a +2.5 neck length and 36 mm head was performed. This was found to be the most stable to flexion greater than 90 degrees and on internal and external rotation. Limb lengths were assessed using three techniques. First, the position of the tip of the operative greater trochanter was compared to the center of the femoral head component and was felt to match this same anatomic relationship as the non-operative hip based on preoperative X-rays.  Next, we measured the length of our resected femoral head neck and felt that the trial components matched this resected length as closely as possible when accounting for the length provided by the femoral neck/head. Finally, we compared leg lengths by comparing the possible of the patella with the patients heels together with the patient in the lateral decubitus position. Using these methods it was felt that the patients leg lengths were equilibrated and with appropriate stability as mentioned above. All trial components were removed. The final liner was impacted into place which was a 10 degree elevated liner. A cementless size 3 127 degree Accolade II stem was impacted into place carefully. We were careful to observe the calcar region for any iatrogenic fractures during insertion. The permanent femoral head was impacted into place which was a +2.5mm 36mm ceramic head. Cami Shells hip was reduced and stability was as mentioned above. Dilute Betadine solution was allowed to sit in the surgical site for a 3 minute period and copious saline was used to irrigate the surgical site. The sciatic nerve was palpated and noted to be intact. A periarticular of ropivicaine, toradol, and morphine was injected about the surgical site with care being taken not to inject in the vicinity of neurovascular structures. Prior to wound closure one additional gram of TXA was given for a total of 2 grams. The capsule was repaired with a three #2 Fiberwires secured through drill holes placed in the posterior aspect of the trochanter. No drain was placed. The fascia was closed with a  #0 Bidirectional Stratafix barbed suture. The sub-Q layer was closed with 2-0 Vicryl suture and a  3-0 moncril stratafix suture in a running subcuticular fashion was used to close the skin. The incision site was thoroughly cleaned with alcohol and the Exofin wound closure system was applied to seal it off from external contamination after the overlying skin was thoroughly cleaned with alcohol.   A thin layer of [place] lubricant was applied over the wound to keep the dressing from adhering to the overlying sterile bandage and said bandage was placed. Drapes were then broken down and patient was transferred carefully back to the OR stretcher. The sponge and needle counts were correct. The patient tolerated the procedure without difficulty and left the operating room in satisfactory condition.     Signed By: Cathryn Xie MD

## 2018-10-17 NOTE — PERIOP NOTES
Teach back method used in review of Incentive Spirometer(reached 1500 preop), Hibiclens usage preop, TB screening and pain management goals.

## 2018-10-17 NOTE — PROGRESS NOTES
Problem: Self Care Deficits Care Plan (Adult)  Goal: *Acute Goals and Plan of Care (Insert Text)  GOALS:   DISCHARGE GOALS (in preparation for going home/rehab):  3 days  1. Mr. Talya Huntley will perform one lower body dressing activity with minimal assistance with adaptive equipment to demonstrate improved functional mobility and safety. 2.  Mr. Talya Huntley will perform one lower body bathing activity with minimal  assistance with adaptive equipment to demonstrate improved functional mobility and safety. 3.  Mr. Talay Huntley will perform toileting/toilet transfer with contact guard assistance with adaptive equipment to demonstrate improved functional mobility and safety. 4.  Mr. Talya Huntley will perform shower transfer with contact guard assistance with adaptive equipment to demonstrate improved functional mobility and safety. 5.  Mr. Talya Huntley will state MAYDA precautions with two verbal cues to demonstrate improved functional mobility and safety. JOINT CAMP OCCUPATIONAL THERAPY MAYDA: Initial Assessment and Treatment Day: 1st 10/17/2018  INPATIENT: Hospital Day: 1  Payor: Elida Pemberton / [place] BLUE ESSENTIALS ERIKA / Product Type: ERIKA /      NAME/AGE/GENDER: Latonya Gonzalez is a 70 y.o. male   PRIMARY DIAGNOSIS:  Localized osteoarthrosis of right hip [M16.11]   Procedure(s) and Anesthesia Type:     * RIGHT HIP ARTHROPLASTY TOTAL / [address] - Spinal (Right)  ICD-10: Treatment Diagnosis:    · Pain in Right Hip (M25.551)  · Stiffness of Right Hip, Not elsewhere classified (M25.651)      ASSESSMENT:     Mr. Talya Huntley is s/p Right MAYDA and presents with decreased weight bearing on R LE and decreased independence with functional mobility and activities of daily living as compared to prior level of function and safety. Patient would benefit from skilled Occupational Therapy to maximize independence and safety with self-care task and functional mobility.   Pt would also benefit from education on lower body adaptive equipment and hip precautions post-surgery in preparation for going home with support of daughter. Patient able to SPT from bed to recliner using a RW. Daughter in room during session and here to help with language. This section established at most recent assessment   PROBLEM LIST (Impairments causing functional limitations):  1. Decreased Strength  2. Decreased ADL/Functional Activities  3. Decreased Transfer Abilities  4. Increased Pain  5. Increased Fatigue  6. Decreased Flexibility/Joint Mobility  7. Decreased Knowledge of Precautions   INTERVENTIONS PLANNED: (Benefits and precautions of occupational therapy have been discussed with the patient.)  1. Activities of daily living training  2. Adaptive equipment training  3. Balance training  4. Clothing management  5. Donning&doffing training  6. Theraputic activity     TREATMENT PLAN: Frequency/Duration: Follow patient 1-2tx to address above goals. Rehabilitation Potential For Stated Goals: Excellent     RECOMMENDED REHABILITATION/EQUIPMENT: (at time of discharge pending progress): Continue Skilled Therapy. OCCUPATIONAL PROFILE AND HISTORY:   History of Present Injury/Illness (Reason for Referral): Pt presents this date s/p (right) MAYDA. Past Medical History/Comorbidities:   Mr. Jeffrey Yancey  has a past medical history of Arthritis, GERD (gastroesophageal reflux disease), HTN (hypertension), Hyperlipidemia, PAC (premature atrial contraction), and Palpitations. Mr. Jeffrey Yancey  has no past surgical history on file. Social History/Living Environment:   Home Environment: Private residence  Living Alone: No  Support Systems: Child(sabrina)  Patient Expects to be Discharged to[de-identified] Private residence  Current DME Used/Available at Home: None  Prior Level of Function/Work/Activity:  Independent prior.    Number of Personal Factors/Comorbidities that affect the Plan of Care: Brief history (0):  LOW COMPLEXITY   ASSESSMENT OF OCCUPATIONAL PERFORMANCE[de-identified]   Most Recent Physical Functioning:   Balance  Sitting: Intact  Standing: With support                    Coordination  Fine Motor Skills-Upper: Left Intact; Right Intact  Gross Motor Skills-Upper: Left Intact; Right Intact         Mental Status  Neurologic State: Alert  Orientation Level: Oriented X4  Cognition: Appropriate decision making  Perception: Appears intact                Basic ADLs (From Assessment) Complex ADLs (From Assessment)   Basic ADL  Feeding: Independent  Oral Facial Hygiene/Grooming: Setup  Bathing: Minimum assistance  Upper Body Dressing: Setup  Lower Body Dressing: Maximum assistance  Toileting: Moderate assistance     Grooming/Bathing/Dressing Activities of Daily Living                       Functional Transfers  Bathroom Mobility: Minimum assistance     Bed/Mat Mobility  Supine to Sit: Contact guard assistance  Sit to Stand: Contact guard assistance  Bed to Chair: Minimum assistance         Physical Skills Involved:  1. Range of Motion  2. Balance  3. Strength Cognitive Skills Affected (resulting in the inability to perform in a timely and safe manner): 1. wfl Psychosocial Skills Affected: 1. wfl   Number of elements that affect the Plan of Care: 1-3:  LOW COMPLEXITY   CLINICAL DECISION MAKING:   MG MIRAGE AM-PAC 6 Clicks   Daily Activity Inpatient Short Form  How much help from another person does the patient currently need. .. Total A Lot A Little None   1. Putting on and taking off regular lower body clothing? [x] 1   [] 2   [] 3   [] 4   2. Bathing (including washing, rinsing, drying)? [] 1   [x] 2   [] 3   [] 4   3. Toileting, which includes using toilet, bedpan or urinal?   [] 1   [x] 2   [] 3   [] 4   4. Putting on and taking off regular upper body clothing? [] 1   [] 2   [] 3   [x] 4   5. Taking care of personal grooming such as brushing teeth? [] 1   [] 2   [] 3   [x] 4   6. Eating meals?    [] 1   [] 2   [] 3   [x] 4   © 2007, Trustees of [facility] [address] [address], under license to [employer]. All rights reserved     Score:  Initial: 17 Most Recent: X (Date: -- )    Interpretation of Tool:  Represents activities that are increasingly more difficult (i.e. Bed mobility, Transfers, Gait). Score 24 23 22-20 19-15 14-10 9-7 6     Modifier CH CI CJ CK CL CM CN      ? Self Care:     - CURRENT STATUS: CK - 40%-59% impaired, limited or restricted    - GOAL STATUS: CJ - 20%-39% impaired, limited or restricted    - D/C STATUS:  ---------------To be determined---------------  Payor: BLUE CROSS / [place]ENTIAL[place] ERIKA / Product Type: ERIKA /      Medical Necessity:     · Skilled intervention continues to be required due to Deficits listed above. Reason for Services/Other Comments:  · Patient continues to require skilled intervention due to new MAYDA. Use of outcome tool(s) and clinical judgement create a POC that gives a: MODERATE COMPLEXITY            TREATMENT:   (In addition to Assessment/Re-Assessment sessions the following treatments were rendered)     Pre-treatment Symptoms/Complaints:    Pain: Initial:   Pain Intensity 1: 2  Post Session:  2     Therapeutic Activity: (    10): Therapeutic activities including Bed transfers, Chair transfers and Ambulation on level ground to improve mobility and balance. Required minimal   to promote motor control of bilateral, upper extremity(s), lower extremity(s). Initial Evaluation (10 minutes). Treatment/Session Assessment:     Response to Treatment:  Good, sitting up in recliner.     Education:  [] Home Exercises  [x] Fall Precautions  [x] Hip Precautions [] Going Home Video  [] Knee/Hip Prosthesis Review  [x] Walker Management/Safety [x] Adaptive Equipment as Needed       Interdisciplinary Collaboration:   o Physical Therapist  o Occupational Therapist  o Registered Nurse    After treatment position/precautions:   o Up in chair  o Bed/Chair-wheels locked  o Caregiver at bedside  o Call light within reach  o RN notified     Compliance with Program/Exercises: compliant all of the time. Recommendations/Intent for next treatment session:  Treatment next visit will focus on increasing Mr. Teresa Neff independence with bed mobility, transfers, self care, functional mobility, modalities for pain, and patient education.       Total Treatment Duration:  OT Patient Time In/Time Out  Time In: 1345  Time Out: Margy 83, OT

## 2018-10-17 NOTE — ANESTHESIA PREPROCEDURE EVALUATION
Anesthetic History Review of Systems / Medical History Patient summary reviewed, nursing notes reviewed and pertinent labs reviewed Pulmonary Neuro/Psych Cardiovascular Hypertension Exercise tolerance: >4 METS 
  
GI/Hepatic/Renal 
  
 
 
 
 
 
 Endo/Other Other Findings Physical Exam 
 
Airway Mallampati: II 
 
 
 
 
 Cardiovascular Regular rate and rhythm,  S1 and S2 normal,  no murmur, click, rub, or gallop Rhythm: regular Dental 
No notable dental hx Pulmonary Abdominal 
 
 
 
 Other Findings Anesthetic Plan ASA: 2 Anesthesia type: spinal 
 
 
 
 
Induction: Intravenous Anesthetic plan and risks discussed with: Patient

## 2018-10-17 NOTE — PROGRESS NOTES
Problem: Mobility Impaired (Adult and Pediatric)  Goal: *Acute Goals and Plan of Care (Insert Text)  GOALS (1-4 days):  (1.)Mr. Belkis Morton will move from supine to sit and sit to supine  in bed with MODIFIED INDEPENDENCE. (2.)Mr. Belkis Morton will transfer from bed to chair and chair to bed with SUPERVISION using the least restrictive device. (3.)Mr. Belkis Morton will ambulate with SUPERVISION for 200 feet with the least restrictive device. (4.)Mr. Belkis Morton will ambulate up/down 17 steps with left railing with STAND BY ASSIST with no device. (5.)Mr. Belkis Morton will state/observe MAYDA precautions with no verbal cues. ________________________________________________________________________________________________    PHYSICAL THERAPY [facility] [place] Mayda: Initial Assessment, Treatment Day: Day of Assessment, PM 10/17/2018  INPATIENT: Hospital Day: 1  Payor: Ray Paul / [place] BLUE ESSENTIALS ERIKA / Product Type: ERIKA /      NAME/AGE/GENDER: Sean Mcadams is a 70 y.o. male   PRIMARY DIAGNOSIS:  Localized osteoarthrosis of right hip [M16.11]   Procedure(s) and Anesthesia Type:     * RIGHT HIP ARTHROPLASTY TOTAL / [address] - Spinal (Right)  ICD-10: Treatment Diagnosis:    · Pain in Right Hip (M25.551)  · Stiffness of Right Hip, Not elsewhere classified (M25.651)  · Difficulty in walking, Not elsewhere classified (R26.2)      ASSESSMENT:     Mr. Belkis Morton presents with impaired strength & mobility s/p right MAYDA. Pt also had decreased stability during out of bed activity. Pt will benefit from follow up therapy to help restore safe function prior to returning home with caregiver    This section established at most recent assessment   PROBLEM LIST (Impairments causing functional limitations):  1. Decreased Strength  2. Decreased ADL/Functional Activities  3. Decreased Transfer Abilities  4. Decreased Ambulation Ability/Technique  5. Decreased Balance  6. Increased Pain  7.  Decreased Activity Tolerance  8. Decreased Knowledge of Precautions  9. Decreased [place] with Home Exercise Program   INTERVENTIONS PLANNED: (Benefits and precautions of physical therapy have been discussed with the patient.)  1. Bed Mobility  2. Gait Training  3. Home Exercise Program (HEP)  4. Therapeutic Exercise/Strengthening  5. Transfer Training  6. Range of Motion: active/assisted/passive  7. Therapeutic Activities  8. Group Therapy     TREATMENT PLAN: Frequency/Duration: Follow patient BID for duration of hospital stay to address above goals. Rehabilitation Potential For Stated Goals: Good     RECOMMENDED REHABILITATION/EQUIPMENT: (at time of discharge pending progress): Continue Skilled Therapy and Home Health: Physical Therapy. HISTORY:   History of Present Injury/Illness (Reason for Referral): The patient has end stage arthritis of the right hip. The patient was evaluated and examined in the office prior to today and was found to have a history on physical exam consistent with intra-articular pathology of the right hip. Patient complains of anterior groin pain predominately. Pain occurs during activity. Patient has difficulty putting on socks/shoes and has notable pain when getting up from a chair and getting out of a car. Patient does not complain of significant lateral hip pain or radicular pain down the leg. There have been no changes to the patient's orthopedic condition since the last office visit      Past Medical History/Comorbidities:   Mr. Tono Lynch  has a past medical history of Arthritis, GERD (gastroesophageal reflux disease), HTN (hypertension), Hyperlipidemia, PAC (premature atrial contraction), and Palpitations. Mr. Tono Lynch  has no past surgical history on file.   Social History/Living Environment:   Home Environment: Private residence  # Steps to Enter: 0  One/Two Story Residence: Two story, live on 1st floor  # of Interior Steps: 17  Interior Rails: Left  Living Alone: No  Support Systems: Child(sabrina)  Patient Expects to be Discharged to[de-identified] Private residence  Current DME Used/Available at Home: None  Prior Level of Function/Work/Activity:  Pt was independent without an assistive device prior to this admission   Number of Personal Factors/Comorbidities that affect the Plan of Care: 3+: HIGH COMPLEXITY   EXAMINATION:   Most Recent Physical Functioning:   Gross Assessment: Yes  Gross Assessment  AROM: Within functional limits(left LE)  Strength: Within functional limits(left LE)  Coordination: Within functional limits(left LE)                     Bed Mobility  Supine to Sit: Contact guard assistance  Sit to Supine: Contact guard assistance  Scooting: Contact guard assistance    Transfers  Sit to Stand: Contact guard assistance  Stand to Sit: Contact guard assistance  Bed to Chair: Contact guard assistance(with walker)    Balance  Sitting: Intact; Without support  Standing: Impaired; With support(walker)              Weight Bearing Status  Right Side Weight Bearing: As tolerated  Distance (ft): 100 Feet (ft)  Ambulation - Level of Assistance: Contact guard assistance  Assistive Device: Walker, rolling  Speed/Raquel: Delayed  Step Length: Left shortened  Stance: Right decreased  Gait Abnormalities: Antalgic;Decreased step clearance        Braces/Orthotics: none           Body Structures Involved:  1. Joints  2. Muscles Body Functions Affected:  1. Sensory/Pain  2. Movement Related Activities and Participation Affected:  1. General Tasks and Demands  2. Mobility   Number of elements that affect the Plan of Care: 4+: HIGH COMPLEXITY   CLINICAL PRESENTATION:   Presentation: Stable and uncomplicated: LOW COMPLEXITY   CLINICAL DECISION MAKIN [facility] [address] [id number] AM-PAC 6 Clicks   Basic Mobility Inpatient Short Form  How much difficulty does the patient currently have. .. Unable A Lot A Little None   1. Turning over in bed (including adjusting bedclothes, sheets and blankets)?    [] 1   [] 2   [x] 3 [] 4   2. Sitting down on and standing up from a chair with arms ( e.g., wheelchair, bedside commode, etc.)   [] 1   [] 2   [x] 3   [] 4   3. Moving from lying on back to sitting on the side of the bed? [] 1   [] 2   [x] 3   [] 4   How much help from another person does the patient currently need. .. Total A Lot A Little None   4. Moving to and from a bed to a chair (including a wheelchair)? [] 1   [] 2   [x] 3   [] 4   5. Need to walk in hospital room? [] 1   [] 2   [x] 3   [] 4   6. Climbing 3-5 steps with a railing? [x] 1   [] 2   [] 3   [] 4   © 2007, Trustees of [address] [place] [address] [address] [address], under license to [employer]. All rights reserved        Score:  Initial: 16 Most Recent: X (Date: -- )    Interpretation of Tool:  Represents activities that are increasingly more difficult (i.e. Bed mobility, Transfers, Gait). Score 24 23 22-20 19-15 14-10 9-7 6     Modifier CH CI CJ CK CL CM CN      ? Mobility - Walking and Moving Around:     - CURRENT STATUS: CK - 40%-59% impaired, limited or restricted    - GOAL STATUS: CJ - 20%-39% impaired, limited or restricted    - D/C STATUS:  ---------------To be determined---------------  Payor: BLUE CROSS / [place] ESSENTIALS ERIKA / Product Type: ERIKA /      Medical Necessity:     · Patient is expected to demonstrate progress in strength, range of motion, balance, coordination and functional technique to decrease assistance required with bed mobility, transfers & gait. Reason for Services/Other Comments:  · Patient continues to require skilled intervention due to pt not independent with functional mobility.    Use of outcome tool(s) and clinical judgement create a POC that gives a: Clear prediction of patient's progress: LOW COMPLEXITY            TREATMENT:   (In addition to Assessment/Re-Assessment sessions the following treatments were rendered)     Pre-treatment Symptoms/Complaints:  none  Pain: Initial: visual scale  Pain Intensity 1: 3  Pain Location 1: Hip  Pain Orientation 1: Right  Pain Intervention(s) 1: Ambulation/Increased Activity  Post Session:  3/10     Therapeutic Exercise: (12 Minutes):  Exercises per grid below to improve mobility and dynamic movement of leg - right to improve functional endurance. Required minimal verbal cues to promote proper body alignment and promote proper body mechanics. Progressed repetitions as indicated. Assessment/ 11 min     Date:   Date:   Date:     ACTIVITY/EXERCISE AM PM AM PM AM PM   GROUP THERAPY  []  []  []  []  []  []   Ankle Pumps  10       Quad Sets  10       Gluteal Sets  10       Hip ABd/ADduction  10       Straight Leg Raises  --       Knee Slides  10       Short Arc Quads  10       Long Arc Quads  10       Chair Slides                  B = bilateral; AA = active assistive; A = active; P = passive      Treatment/Session Assessment:     Response to Treatment:  Tolerated well    Education:  [x] Home Exercises  [x] Fall Precautions  [x] Hip Precautions [] D/C Instruction Review  [] Knee/Hip Prosthesis Review  [x] Walker Management/Safety [] Adaptive Equipment as Needed       Interdisciplinary Collaboration:   o Registered Nurse  o Rehabilitation Attendant    After treatment position/precautions:   o Up in chair  o Bed/Chair-wheels locked  o Call light within reach  o RN notified    Compliance with Program/Exercises: Will assess as treatment progresses. Recommendations/Intent for next treatment session:  Treatment next visit will focus on increasing Mr. Romelia Looney independence with bed mobility, transfers, gait training, strength/ROM exercises, modalities for pain, and patient education.       Total Treatment Duration:  PT Patient Time In/Time Out  Time In: 1424  Time Out: 2070 CHANO Jacobsen

## 2018-10-18 VITALS
SYSTOLIC BLOOD PRESSURE: 109 MMHG | BODY MASS INDEX: 27.49 KG/M2 | WEIGHT: 165 LBS | OXYGEN SATURATION: 97 % | TEMPERATURE: 98 F | HEIGHT: 65 IN | HEART RATE: 80 BPM | DIASTOLIC BLOOD PRESSURE: 68 MMHG | RESPIRATION RATE: 16 BRPM

## 2018-10-18 LAB
ANION GAP SERPL CALC-SCNC: 8 MMOL/L
BUN SERPL-MCNC: 18 MG/DL (ref 8–23)
CALCIUM SERPL-MCNC: 8.4 MG/DL (ref 8.3–10.4)
CHLORIDE SERPL-SCNC: 107 MMOL/L (ref 98–107)
CO2 SERPL-SCNC: 23 MMOL/L (ref 21–32)
CREAT SERPL-MCNC: 1.17 MG/DL (ref 0.8–1.5)
GLUCOSE SERPL-MCNC: 252 MG/DL (ref 65–100)
HGB BLD-MCNC: 12.7 G/DL (ref 13.6–17.2)
MM INDURATION POC: 0 MM (ref 0–5)
MM INDURATION POC: NORMAL MM (ref 0–5)
POTASSIUM SERPL-SCNC: 4.5 MMOL/L (ref 3.5–5.1)
PPD POC: NORMAL NEGATIVE
PPD POC: NORMAL NEGATIVE
SODIUM SERPL-SCNC: 138 MMOL/L (ref 136–145)

## 2018-10-18 PROCEDURE — 94760 N-INVAS EAR/PLS OXIMETRY 1: CPT

## 2018-10-18 PROCEDURE — 97150 GROUP THERAPEUTIC PROCEDURES: CPT

## 2018-10-18 PROCEDURE — 85018 HEMOGLOBIN: CPT

## 2018-10-18 PROCEDURE — 36415 COLL VENOUS BLD VENIPUNCTURE: CPT

## 2018-10-18 PROCEDURE — 97535 SELF CARE MNGMENT TRAINING: CPT

## 2018-10-18 PROCEDURE — 74011250637 HC RX REV CODE- 250/637: Performed by: ORTHOPAEDIC SURGERY

## 2018-10-18 PROCEDURE — 97110 THERAPEUTIC EXERCISES: CPT

## 2018-10-18 PROCEDURE — 97116 GAIT TRAINING THERAPY: CPT

## 2018-10-18 PROCEDURE — 90686 IIV4 VACC NO PRSV 0.5 ML IM: CPT | Performed by: ORTHOPAEDIC SURGERY

## 2018-10-18 PROCEDURE — 90471 IMMUNIZATION ADMIN: CPT

## 2018-10-18 PROCEDURE — 80048 BASIC METABOLIC PNL TOTAL CA: CPT

## 2018-10-18 PROCEDURE — 74011250636 HC RX REV CODE- 250/636: Performed by: ORTHOPAEDIC SURGERY

## 2018-10-18 RX ADMIN — ACETAMINOPHEN 1000 MG: 500 TABLET, FILM COATED ORAL at 06:45

## 2018-10-18 RX ADMIN — MULTIPLE VITAMINS W/ MINERALS TAB 1 TABLET: TAB at 08:39

## 2018-10-18 RX ADMIN — Medication 1 AMPULE: at 08:39

## 2018-10-18 RX ADMIN — SENNOSIDES AND DOCUSATE SODIUM 2 TABLET: 8.6; 5 TABLET ORAL at 08:39

## 2018-10-18 RX ADMIN — ACETAMINOPHEN 1000 MG: 500 TABLET, FILM COATED ORAL at 00:09

## 2018-10-18 RX ADMIN — Medication 2 G: at 01:20

## 2018-10-18 RX ADMIN — ASPIRIN 325 MG: 325 TABLET, DELAYED RELEASE ORAL at 08:39

## 2018-10-18 RX ADMIN — KETOROLAC TROMETHAMINE 15 MG: 15 INJECTION, SOLUTION INTRAMUSCULAR; INTRAVENOUS at 06:45

## 2018-10-18 RX ADMIN — Medication 10 ML: at 06:51

## 2018-10-18 RX ADMIN — CYCLOBENZAPRINE HYDROCHLORIDE 5 MG: 10 TABLET, FILM COATED ORAL at 08:38

## 2018-10-18 RX ADMIN — BENAZEPRIL HYDROCHLORIDE: 10 TABLET ORAL at 08:38

## 2018-10-18 RX ADMIN — INFLUENZA VIRUS VACCINE 0.5 ML: 15; 15; 15; 15 SUSPENSION INTRAMUSCULAR at 08:39

## 2018-10-18 RX ADMIN — ACETAMINOPHEN 1000 MG: 500 TABLET, FILM COATED ORAL at 12:39

## 2018-10-18 RX ADMIN — GABAPENTIN 100 MG: 100 CAPSULE ORAL at 08:39

## 2018-10-18 NOTE — PROGRESS NOTES
2018         Post Op day: 1 Day Post-Op     Admit Date: 10/17/2018  Admit Diagnosis: Localized osteoarthrosis of right hip [M16.11]        Subjective: Patient stable. No acute events. Objective:   Visit Vitals  /63 (BP 1 Location: Right arm, BP Patient Position: At rest)   Pulse 74   Temp 98 °F (36.7 °C)   Resp 16   Ht 5' 5\" (1.651 m)   Wt 74.8 kg (165 lb)   SpO2 97%   BMI 27.46 kg/m²    Temp (24hrs), Av.9 °F (36.6 °C), Min:97.4 °F (36.3 °C), Max:98.3 °F (36.8 °C)    Lab Results   Component Value Date/Time    HGB 12.7 (L) 10/18/2018 05:32 AM     Extremity Exam  Dressing clean and dry   Tibialis Anterior and Gastroc-Soleus functioning normally Right lower extremity  Sensation intact to light touch on operative limb  Extremity perfused  TEDS/SCDS in place  No sign of DVT     Assessment / Plan :  WBAT RLE  Continue PT/OT  Continue current DVT prophylaxis in house. Discharge on ASA BID  DIspo-HH  Patient Active Problem List   Diagnosis Code    Palpitations R00.2    Encounter to establish care Z76.89    Family history of coronary arteriosclerosis Z82.49    Essential hypertension with goal blood pressure less than 130/85 I10    Paroxysmal tachycardia (HCC) I47.9    Mixed hyperlipidemia E78.2    PAC (premature atrial contraction) I49.1    OA (osteoarthritis) of hip M16.9          Signed By: Ghazal Rivers NP     I have reviewed the patients controlled substance prescription history, as maintained in the [place] prescription monitoring program, so that the prescription(s) for a  controlled substance can be given.

## 2018-10-18 NOTE — PROGRESS NOTES
Spiritual Care initial visit made by Contreras Morocho. Eucharist given. CRYSTAL Stevens Div  / Bereavement Coordinator

## 2018-10-18 NOTE — PROGRESS NOTES
10/18/18 0842   Oxygen Therapy   O2 Sat (%) 97 %   Pulse via Oximetry 87 beats per minute   O2 Device Room air   O2 Flow Rate (L/min) 0 l/min   Incentive Spirometry Treatment   Actual Volume (ml) 2000 ml   Number of Attempts Greater than 3

## 2018-10-18 NOTE — PROGRESS NOTES
Had therapy. Remains up in recliner. Daughter in law in room. Pt says he is a little sore but pain is better than it was before surgery. NV checks WDL. R hip incision intact with prineo with scant drainage. Ice pack to hip. Using IS. Reminded of hip precautions.

## 2018-10-18 NOTE — PROGRESS NOTES
Problem: Mobility Impaired (Adult and Pediatric)  Goal: *Acute Goals and Plan of Care (Insert Text)  GOALS (1-4 days):  (1.)Mr. Esme Ocampo will move from supine to sit and sit to supine  in bed with MODIFIED INDEPENDENCE. (2.)Mr. Esme Ocampo will transfer from bed to chair and chair to bed with SUPERVISION using the least restrictive device. (3.)Mr. Esme Ocampo will ambulate with SUPERVISION for 200 feet with the least restrictive device. (4.)Mr. Esme Ocampo will ambulate up/down 17 steps with left railing with STAND BY ASSIST with no device. (5.)Mr. Esme Ocampo will state/observe MAYDA precautions with no verbal cues. ________________________________________________________________________________________________    PHYSICAL THERAPY [facility] [place]: Daily Note, AM 10/18/2018  INPATIENT: Hospital Day: 2  Payor: Jeana Aguilar / [place]UE ESSENTIALS ERIKA / Product Type: Jay Ramos /      NAME/AGE/GENDER: Santo Zavala is a 70 y.o. male   PRIMARY DIAGNOSIS:  Localized osteoarthrosis of right hip [M16.11]   Procedure(s) and Anesthesia Type:     * RIGHT HIP ARTHROPLASTY TOTAL / [address] - Spinal (Right)  ICD-10: Treatment Diagnosis:    · Pain in Right Hip (M25.551)  · Stiffness of Right Hip, Not elsewhere classified (M25.651)  · Difficulty in walking, Not elsewhere classified (R26.2)      ASSESSMENT:     Mr. Esme Ocampo presents with impaired strength & mobility s/p right MAYDA. Pt also had decreased stability during out of bed activity. Pt will benefit from follow up therapy to help restore safe function prior to returning home with caregiver  He is supine upon arrival.  He performs exercises in the bed without increase in pain. He increase his distance using RW with SBA and no LOB. He return to the chair with call light near. This section established at most recent assessment   PROBLEM LIST (Impairments causing functional limitations):  1. Decreased Strength  2.  Decreased ADL/Functional Activities  3. Decreased Transfer Abilities  4. Decreased Ambulation Ability/Technique  5. Decreased Balance  6. Increased Pain  7. Decreased Activity Tolerance  8. Decreased Knowledge of Precautions  9. Decreased [place] with Home Exercise Program   INTERVENTIONS PLANNED: (Benefits and precautions of physical therapy have been discussed with the patient.)  1. Bed Mobility  2. Gait Training  3. Home Exercise Program (HEP)  4. Therapeutic Exercise/Strengthening  5. Transfer Training  6. Range of Motion: active/assisted/passive  7. Therapeutic Activities  8. Group Therapy     TREATMENT PLAN: Frequency/Duration: Follow patient BID for duration of hospital stay to address above goals. Rehabilitation Potential For Stated Goals: Good     RECOMMENDED REHABILITATION/EQUIPMENT: (at time of discharge pending progress): Continue Skilled Therapy and Home Health: Physical Therapy. HISTORY:   History of Present Injury/Illness (Reason for Referral): The patient has end stage arthritis of the right hip. The patient was evaluated and examined in the office prior to today and was found to have a history on physical exam consistent with intra-articular pathology of the right hip. Patient complains of anterior groin pain predominately. Pain occurs during activity. Patient has difficulty putting on socks/shoes and has notable pain when getting up from a chair and getting out of a car. Patient does not complain of significant lateral hip pain or radicular pain down the leg. There have been no changes to the patient's orthopedic condition since the last office visit      Past Medical History/Comorbidities:   Mr. Rubi Cali  has a past medical history of Arthritis, GERD (gastroesophageal reflux disease), HTN (hypertension), Hyperlipidemia, PAC (premature atrial contraction), and Palpitations. Mr. Rubi Cali  has no past surgical history on file.   Social History/Living Environment:   Home Environment: Private residence  # Steps to Enter: 0  One/Two Story Residence: Two story, live on 1st floor  # of Interior Steps: 16  Interior Rails: Left  Living Alone: No  Support Systems: Child(sabrina)  Patient Expects to be Discharged to[de-identified] Private residence  Current DME Used/Available at Home: None  Prior Level of Function/Work/Activity:  Pt was independent without an assistive device prior to this admission   Number of Personal Factors/Comorbidities that affect the Plan of Care: 3+: HIGH COMPLEXITY   EXAMINATION:   Most Recent Physical Functioning:                            Bed Mobility  Supine to Sit: Stand-by assistance  Sit to Supine: (left up in chair)  Scooting: Stand-by assistance    Transfers  Sit to Stand: Stand-by assistance  Stand to Sit: Stand-by assistance  Bed to Chair: Stand-by assistance                   Weight Bearing Status  Right Side Weight Bearing: As tolerated  Distance (ft): 130 Feet (ft)  Ambulation - Level of Assistance: Stand-by assistance  Assistive Device: Walker, rolling  Speed/Raquel: Delayed  Step Length: Left shortened  Stance: Right decreased  Gait Abnormalities: Antalgic;Decreased step clearance        Braces/Orthotics: none    Right Hip Cold  Type: Cold/ice packs      Body Structures Involved:  1. Joints  2. Muscles Body Functions Affected:  1. Sensory/Pain  2. Movement Related Activities and Participation Affected:  1. General Tasks and Demands  2. Mobility   Number of elements that affect the Plan of Care: 4+: HIGH COMPLEXITY   CLINICAL PRESENTATION:   Presentation: Stable and uncomplicated: LOW COMPLEXITY   CLINICAL DECISION MAKIN [facility] [id number] AM-PAC 6 Clicks   Basic Mobility Inpatient Short Form  How much difficulty does the patient currently have. .. Unable A Lot A Little None   1. Turning over in bed (including adjusting bedclothes, sheets and blankets)? [] 1   [] 2   [x] 3   [] 4   2.   Sitting down on and standing up from a chair with arms ( e.g., wheelchair, bedside commode, etc.)   [] 1 [] 2   [x] 3   [] 4   3. Moving from lying on back to sitting on the side of the bed? [] 1   [] 2   [x] 3   [] 4   How much help from another person does the patient currently need. .. Total A Lot A Little None   4. Moving to and from a bed to a chair (including a wheelchair)? [] 1   [] 2   [x] 3   [] 4   5. Need to walk in hospital room? [] 1   [] 2   [x] 3   [] 4   6. Climbing 3-5 steps with a railing? [x] 1   [] 2   [] 3   [] 4   © 2007, Trustees of [address] [place] [address] [address] [address], under license to [employer]. All rights reserved        Score:  Initial: 16 Most Recent: X (Date: -- )    Interpretation of Tool:  Represents activities that are increasingly more difficult (i.e. Bed mobility, Transfers, Gait). Score 24 23 22-20 19-15 14-10 9-7 6     Modifier CH CI CJ CK CL CM CN      ? Mobility - Walking and Moving Around:     - CURRENT STATUS: CK - 40%-59% impaired, limited or restricted    - GOAL STATUS: CJ - 20%-39% impaired, limited or restricted    - D/C STATUS:  ---------------To be determined---------------  Payor: BLUE CROSS / [place] BLUE ESSENTIALS ERIKA / Product Type: ERIKA /      Medical Necessity:     · Patient is expected to demonstrate progress in strength, range of motion, balance, coordination and functional technique to decrease assistance required with bed mobility, transfers & gait. Reason for Services/Other Comments:  · Patient continues to require skilled intervention due to pt not independent with functional mobility.    Use of outcome tool(s) and clinical judgement create a POC that gives a: Clear prediction of patient's progress: LOW COMPLEXITY            TREATMENT:   (In addition to Assessment/Re-Assessment sessions the following treatments were rendered)     Pre-treatment Symptoms/Complaints:  none  Pain: Initial: visual scale  Pain Intensity 1: 0(0/10 after therapy)  Post Session:      Therapeutic Exercise: (15 Minutes):  Exercises per grid below to improve mobility and dynamic movement of leg - right to improve functional endurance. Required minimal verbal cues to promote proper body alignment and promote proper body mechanics. Progressed repetitions as indicated. Gait Training (15 Minutes):  Gait training to improve and/or restore physical functioning as related to mobility. Ambulated 130 Feet (ft) with Stand-by assistance using a Walker, rolling and minimal   related to their hip position and motion to promote proper body alignment. Date:   Date:  10/18   Date:     ACTIVITY/EXERCISE AM PM AM PM AM PM   GROUP THERAPY  []  []  []  []  []  []   Ankle Pumps  10 15      Quad Sets  10 15      Gluteal Sets  10 15      Hip ABd/ADduction  10 15      Straight Leg Raises  --       Knee Slides  10 15      Short Arc Quads  10 15      Long Arc Quads  10       Chair Slides                  B = bilateral; AA = active assistive; A = active; P = passive      Treatment/Session Assessment:     Response to Treatment:  Tolerated gait and TH exercises well    Education:  [x] Home Exercises  [x] Fall Precautions  [x] Hip Precautions [] D/C Instruction Review  [] Knee/Hip Prosthesis Review  [x] Walker Management/Safety [] Adaptive Equipment as Needed      ell Interdisciplinary Collaboration:   o Physical Therapy Assistant  o Registered Nurse    After treatment position/precautions:   o Up in chair  o Bed/Chair-wheels locked  o Call light within reach  o RN notified    Compliance with Program/Exercises: Will assess as treatment progresses. Recommendations/Intent for next treatment session:  Treatment next visit will focus on increasing Mr. Sawyer Gamboa independence with bed mobility, transfers, gait training, strength/ROM exercises, modalities for pain, and patient education.       Total Treatment Duration:  PT Patient Time In/Time Out  Time In: 0645  Time Out: 0715    Aida Hanley, PTA

## 2018-10-18 NOTE — PROGRESS NOTES
Problem: Mobility Impaired (Adult and Pediatric)  Goal: *Acute Goals and Plan of Care (Insert Text)  GOALS (1-4 days):  (1.)Mr. Medardo Bermudez will move from supine to sit and sit to supine  in bed with MODIFIED INDEPENDENCE. (2.)Mr. Medardo Bermudez will transfer from bed to chair and chair to bed with SUPERVISION using the least restrictive device. (3.)Mr. Medardo Bermudez will ambulate with SUPERVISION for 200 feet with the least restrictive device. (4.)Mr. Medardo Bermudez will ambulate up/down 17 steps with left railing with STAND BY ASSIST with no device. He wanted to practice 6 steps 10/18  (5.)Mr. Medardo Bermudez will state/observe MAYDA precautions with no verbal cues. 10/18  ________________________________________________________________________________________________    PHYSICAL THERAPY [facility] [place]: Daily Note, Discharge, PM 10/18/2018  INPATIENT: Hospital Day: 2  Payor: Santhosh Carlos / Plan: [address] [place] [address] / Product Type: [employer] /      NAME/AGE/GENDER: Mulu Vaughn is a 70 y.o. male   PRIMARY DIAGNOSIS:  Localized osteoarthrosis of right hip [M16.11]   Procedure(s) and Anesthesia Type:     * RIGHT HIP ARTHROPLASTY TOTAL / [address] - Spinal (Right)  ICD-10: Treatment Diagnosis:    · Pain in Right Hip (M25.551)  · Stiffness of Right Hip, Not elsewhere classified (M25.651)  · Difficulty in walking, Not elsewhere classified (R26.2)      ASSESSMENT:     Mr. Medardo Bermudez presents with impaired strength & mobility s/p right MAYDA. Pt also had decreased stability during out of bed activity. Pt will benefit from follow up therapy to help restore safe function prior to returning home with caregiver  He walk to the gym and back, while in the gym she performs exercises without any problems. He is leaving today. .Mr. Duncan's functional progress occurred more rapidly than expected as evidenced by goal attainment.   He will be discharged to his home environment with a PT HEP, assistive device(s), and Home Health PT services. This section established at most recent assessment   PROBLEM LIST (Impairments causing functional limitations):  1. Decreased Strength  2. Decreased ADL/Functional Activities  3. Decreased Transfer Abilities  4. Decreased Ambulation Ability/Technique  5. Decreased Balance  6. Increased Pain  7. Decreased Activity Tolerance  8. Decreased Knowledge of Precautions  9. Decreased [place] with Home Exercise Program   INTERVENTIONS PLANNED: (Benefits and precautions of physical therapy have been discussed with the patient.)  1. Bed Mobility  2. Gait Training  3. Home Exercise Program (HEP)  4. Therapeutic Exercise/Strengthening  5. Transfer Training  6. Range of Motion: active/assisted/passive  7. Therapeutic Activities  8. Group Therapy     TREATMENT PLAN: Frequency/Duration: Follow patient BID for duration of hospital stay to address above goals. Rehabilitation Potential For Stated Goals: Good     RECOMMENDED REHABILITATION/EQUIPMENT: (at time of discharge pending progress): Continue Skilled Therapy and Home Health: Physical Therapy. HISTORY:   History of Present Injury/Illness (Reason for Referral): The patient has end stage arthritis of the right hip. The patient was evaluated and examined in the office prior to today and was found to have a history on physical exam consistent with intra-articular pathology of the right hip. Patient complains of anterior groin pain predominately. Pain occurs during activity. Patient has difficulty putting on socks/shoes and has notable pain when getting up from a chair and getting out of a car. Patient does not complain of significant lateral hip pain or radicular pain down the leg.  There have been no changes to the patient's orthopedic condition since the last office visit      Past Medical History/Comorbidities:   Mr. Teodora Lam  has a past medical history of Arthritis, GERD (gastroesophageal reflux disease), HTN (hypertension), Hyperlipidemia, PAC (premature atrial contraction), and Palpitations. Mr. Vy Benedict  has a past surgical history that includes RIGHT HIP ARTHROPLASTY TOTAL / [address] (Right, 10/17/2018). Social History/Living Environment:   Home Environment: Private residence  # Steps to Enter: 0  One/Two Story Residence: Two story, live on 1st floor  # of Interior Steps: 16  Interior Rails: Left  Living Alone: No  Support Systems: Child(sabrina)  Patient Expects to be Discharged to[de-identified] Private residence  Current DME Used/Available at Home: None  Prior Level of Function/Work/Activity:  Pt was independent without an assistive device prior to this admission   Number of Personal Factors/Comorbidities that affect the Plan of Care: 3+: HIGH COMPLEXITY   EXAMINATION:   Most Recent Physical Functioning:                            Bed Mobility  Supine to Sit: Supervision  Sit to Supine: (left up in chair)  Scooting: Stand-by assistance    Transfers  Sit to Stand: Stand-by assistance  Stand to Sit: Stand-by assistance  Bed to Chair: Stand-by assistance                   Weight Bearing Status  Right Side Weight Bearing: As tolerated  Distance (ft): 104 Feet (ft)(x 2)  Ambulation - Level of Assistance: Stand-by assistance  Assistive Device: Walker, rolling  Speed/Raquel: Delayed  Step Length: Left shortened  Stance: Right decreased  Gait Abnormalities: Antalgic;Decreased step clearance  Number of Stairs Trained: 6  Stairs - Level of Assistance: Stand-by assistance  Rail Use: Both     Braces/Orthotics: none    Right Hip Cold  Type: Cold/ice packs      Body Structures Involved:  1. Joints  2. Muscles Body Functions Affected:  1. Sensory/Pain  2. Movement Related Activities and Participation Affected:  1. General Tasks and Demands  2.  Mobility   Number of elements that affect the Plan of Care: 4+: HIGH COMPLEXITY   CLINICAL PRESENTATION:   Presentation: Stable and uncomplicated: LOW COMPLEXITY   CLINICAL DECISION MAKIN \[facility]\"" Box [address]246 AM-PAC 6 Clicks   Basic Mobility Inpatient Short Form  How much difficulty does the patient currently have. .. Unable A Lot A Little None   1. Turning over in bed (including adjusting bedclothes, sheets and blankets)? [] 1   [] 2   [x] 3   [] 4   2. Sitting down on and standing up from a chair with arms ( e.g., wheelchair, bedside commode, etc.)   [] 1   [] 2   [x] 3   [] 4   3. Moving from lying on back to sitting on the side of the bed? [] 1   [] 2   [x] 3   [] 4   How much help from another person does the patient currently need. .. Total A Lot A Little None   4. Moving to and from a bed to a chair (including a wheelchair)? [] 1   [] 2   [x] 3   [] 4   5. Need to walk in hospital room? [] 1   [] 2   [x] 3   [] 4   6. Climbing 3-5 steps with a railing? [x] 1   [] 2   [] 3   [] 4   © 2007, Trustees of [address] [place] [address], under license to [employer]. All rights reserved        Score:  Initial: 16 Most Recent: X (Date: -- )    Interpretation of Tool:  Represents activities that are increasingly more difficult (i.e. Bed mobility, Transfers, Gait). Score 24 23 22-20 19-15 14-10 9-7 6     Modifier CH CI CJ CK CL CM CN      ? Mobility - Walking and Moving Around:     - CURRENT STATUS: CK - 40%-59% impaired, limited or restricted    - GOAL STATUS: CJ - 20%-39% impaired, limited or restricted    - D/C STATUS:  ---------------To be determined---------------  Payor: BLUE CROSS / Plan: [place] [employer] / Product Type: ERIKA /      Medical Necessity:     · Patient is expected to demonstrate progress in strength, range of motion, balance, coordination and functional technique to decrease assistance required with bed mobility, transfers & gait. Reason for Services/Other Comments:  · Patient continues to require skilled intervention due to pt not independent with functional mobility.    Use of outcome tool(s) and clinical judgement create a POC that gives a: Clear prediction of patient's progress: LOW COMPLEXITY            TREATMENT:   (In addition to Assessment/Re-Assessment sessions the following treatments were rendered)     Pre-treatment Symptoms/Complaints:  none  Pain: Initial: visual scale  Pain Intensity 1: 0  Post Session:      Therapeutic Exercise: (45 Minutes(group therapy)):  Exercises per grid below to improve mobility and dynamic movement of leg - right to improve functional endurance. Required minimal verbal cues to promote proper body alignment and promote proper body mechanics. Progressed repetitions as indicated. Gait Training (15 Minutes):  Gait training to improve and/or restore physical functioning as related to mobility. Ambulated 104 Feet (ft)(x 2) with Stand-by assistance using a Walker, rolling and minimal   related to their hip position and motion to promote proper body alignment. Date:   Date:  10/18   Date:     ACTIVITY/EXERCISE AM PM AM PM AM PM   GROUP THERAPY  []  []  []  []  []  []   Ankle Pumps  10 15 15     Quad Sets  10 15 15     Gluteal Sets  10 15 15     Hip ABd/ADduction  10 15 15     Straight Leg Raises  --       Knee Slides  10 15 15     Short Arc Quads  10 15 15     Long Arc Quads  10       Chair Slides                  B = bilateral; AA = active assistive; A = active; P = passive      Treatment/Session Assessment:     Response to Treatment:  Tolerated group therapy well    Education:  [x] Home Exercises  [x] Fall Precautions  [x] Hip Precautions [] D/C Instruction Review  [] Knee/Hip Prosthesis Review  [x] Walker Management/Safety [] Adaptive Equipment as Needed      ell Interdisciplinary Collaboration:   o Physical Therapy Assistant  o Registered Nurse    After treatment position/precautions:   o Up in chair  o Bed/Chair-wheels locked  o Call light within reach  o RN notified    Compliance with Program/Exercises: Will assess as treatment progresses.     Recommendations/Intent for next treatment session: D/C home with  HHPT     Total Treatment Duration:  PT Patient Time In/Time Out  Time In: 1300  Time Out: [address], [place]AND[place]

## 2018-10-18 NOTE — PROGRESS NOTES
Shift assessment completed. Pt is alert & oriented x4. Able to verbalize needs. Resting quietly with no distress noted. Dressing to right hip is clean, dry and intact. Ice pack provided,  Neurovascular and peripheral vascular checks WNL. Patient is able to dorsi/planter flex bilaterally with +2 pedal pulses. Aguilar draining clear yellow urine to bag. Incentive spirometry at bedside. Patient encouraged to use hourly x 10 repetitions. Pain to be managed with Dilaudid. Patient has had no pain medication, patient denies pain, patient tolerating well. Bed locked and lowered. Call light within reach. Patient instructed to call for assistance. Pt verbalizes understanding. Will monitor.

## 2018-10-18 NOTE — DISCHARGE INSTRUCTIONS
[address] [facility]   Patient Discharge Instructions    Sean Mcadams / [id number] : 1955    Admitted 10/17/2018 Discharged: 10/18/2018     IF YOU HAVE ANY PROBLEMS ONCE YOU ARE AT HOME CALL THE FOLLOWING NUMBERS:   Main office number: [phone]    Take Home Medications     · It is important that you take the medication exactly as they are prescribed. · Keep your medication in the bottles provided by the pharmacist and keep a list of the medication names, dosages, and times to be taken in your wallet. · Do not take other medications without consulting your doctor. What to do at [address] your prehospital diet. If you have excessive nausea or vomitting call your doctor's office     Home Physical Therapy is arranged. Use rolling walker when walking. Patients who have had a joint replacement should not drive until you are seen for your follow up appointment by Dr. Cheko Bourgeois. When to Call    - Call if you have a temperature greater then 101  - Unable to keep food down  - Loose control of your bladder or bowel function  - Are unable to bear any weight   - Need a pain medication refill      DISCHARGE SUMMARY from Nurse    The following personal items collected during your admission are returned to you:   Dental Appliance: Dental Appliances: None  Vision: Visual Aid: Glasses  Hearing Aid:   na  Jewelry: Jewelry: None  Clothing:   self  Other Valuables: Other Valuables: Wallet, Cell Phone(with Nadege)  Valuables sent to safe:   na    PATIENT INSTRUCTIONS:    After general anesthesia or intravenous sedation, for 24 hours or while taking prescription Narcotics:  · Limit your activities  · Do not drive and operate hazardous machinery  · Do not make important personal or business decisions  · Do  not drink alcoholic beverages  · If you have not urinated within 8 hours after discharge, please contact your surgeon on call.     Report the following to your surgeon:  · Excessive pain, swelling, redness or odor of or around the surgical area  · Temperature over 101  · Nausea and vomiting lasting longer than 4 hours or if unable to take medications  · Any signs of decreased circulation or nerve impairment to extremity: change in color, persistent  numbness, tingling, coldness or increase pain  · Follow hip precautions @ all times! · Any questions, call office @ [phone]      Keep scheduled follow up appointment. If need to change, call office @ [phone]. *  Please give a list of your current medications to your Primary Care Provider. *  Please update this list whenever your medications are discontinued, doses are      changed, or new medications (including over-the-counter products) are added. *  Please carry medication information at all times in case of emergency situations. Hip Replacement Surgery (Posterior): What to Expect at Home  Your Recovery  Hip replacement surgery replaces the worn parts of your hip joint. When you leave the hospital, you will probably be walking with crutches or a walker. You may be able to climb a few stairs and get in and out of bed and chairs. But you will need someone to help you at home for the next few weeks or until you have more energy and can move around better. If there is no one to help you at home, you may go to a rehabilitation center or long-term care center. You will go home with a bandage and stitches, staples, tissue glue, or tape strips. You can remove the bandage when your doctor tells you to. If you have stitches or staples, your doctor will remove them 10 days to 3 weeks after your surgery. Glue or tape strips will fall off on their own over time. You may still have some mild pain, and the area may be swollen for 3 to 4 months after surgery. Your doctor will give you medicine for the pain.   You will continue the rehabilitation program (rehab) you started in the hospital. The better you do with your rehab exercises, the sooner you will get your strength and movement back. Most people are able to return to work 4 weeks to 4 months after surgery. This care sheet gives you a general idea about how long it will take for you to recover. But each person recovers at a different pace. Follow the steps below to get better as quickly as possible. How can you care for yourself at home? Activity    · Your doctor may not want your affected leg to cross the center of your body toward the other leg. If so, your therapist may suggest these ideas:  ? Do not cross your legs. ? Be very careful as you get in or out of bed or a car, so your leg does not cross that imaginary line in the middle of your body.     · Rest when you feel tired. You may take a nap, but do not stay in bed all day.     · Work with your physical therapist to learn the best way to exercise. You may be able to take frequent, short walks using crutches or a walker. You will probably have to use crutches or a walker for at least 4 to 6 weeks.     · Your doctor may advise you to stay away from activities that put stress on the joint. This includes sports such as tennis, football, and jogging.     · Try not to sit for too long at one time. You will feel less stiff if you take a short walk about every hour. When you sit, use chairs with arms, and do not sit in low chairs.     · Do not bend over more than 90 degrees (like the angle in a letter \"L\").     · Sleep on your back with your legs slightly apart or on your side with a pillow between your knees for about 6 weeks or as your doctor tells you. Do not sleep on your stomach or affected leg.     · Ask your doctor when you can drive again.     · Most people are able to return to work 4 weeks to 4 months after surgery.     · Ask your doctor when it is okay for you to have sex. Diet    · By the time you leave the hospital, you will probably be eating your normal diet.  If your stomach is upset, try bland, low-fat foods like plain rice, broiled chicken, toast, and yogurt. Your doctor may recommend that you take iron and vitamin supplements.     · Drink plenty of fluids (unless your doctor tells you not to).   · Eat healthy foods, and watch your portion sizes. Try to stay at your ideal weight. Too much weight puts more stress on your new hip joint.     · You may notice that your bowel movements are not regular right after your surgery. This is common. Try to avoid constipation and straining with bowel movements. You may want to take a fiber supplement every day. If you have not had a bowel movement after a couple of days, ask your doctor about taking a mild laxative. Medicines    · Your doctor will tell you if and when you can restart your medicines. He or she will also give you instructions about taking any new medicines.     · If you take blood thinners, such as warfarin (Coumadin), clopidogrel (Plavix), or aspirin, be sure to talk to your doctor. He or she will tell you if and when to start taking those medicines again. Make sure that you understand exactly what your doctor wants you to do.     · Your doctor may give you a blood-thinning medicine to prevent blood clots. If you take a blood thinner, be sure you get instructions about how to take your medicine safely. Blood thinners can cause serious bleeding problems. This medicine could be in pill form or as a shot (injection). If a shot is necessary, your doctor will tell you how to do this.     · Be safe with medicines. Take pain medicines exactly as directed. ? If the doctor gave you a prescription medicine for pain, take it as prescribed. ? If you are not taking a prescription pain medicine, ask your doctor if you can take an over-the-counter medicine.     · If you think your pain medicine is making you sick to your stomach:  ? Take your medicine after meals (unless your doctor has told you not to). ?  Ask your doctor for a different pain medicine.     · If your doctor prescribed antibiotics, take them as directed. Do not stop taking them just because you feel better. You need to take the full course of antibiotics. Incision care    · If your doctor told you how to care for your cut (incision), follow your doctor's instructions. You will have a dressing over the cut. A dressing helps the incision heal and protects it. Your doctor will tell you how to take care of this.     · If you did not get instructions, follow this general advice:  ? If you have strips of tape on the cut the doctor made, leave the tape on for a week or until it falls off.  ? If you have stitches or staples, your doctor will tell you when to come back to have them removed. ? If you have skin adhesive on the cut, leave it on until it falls off. Skin adhesive is also called glue or liquid stitches. ? Change the bandage every day. ? Wash the area daily with warm water, and pat it dry. Don't use hydrogen peroxide or alcohol. They can slow healing. ? You may cover the area with a gauze bandage if it oozes fluid or rubs against clothing. ? You may shower 24 to 48 hours after surgery. Pat the incision dry. Don't swim or take a bath for the first 2 weeks, or until your doctor tells you it is okay. Exercise    · Your rehab program will include a number of exercises to do. Always do them as your therapist tells you. Ice and elevation    · For pain, put ice or a cold pack on the area for 10 to 20 minutes at a time. Put a thin cloth between the ice and your skin.     · Your ankle may swell for about 3 months. Prop up your ankle when you ice it or anytime you sit or lie down. Try to keep it above the level of your heart. This will help reduce swelling. Other instructions   Continue to wear your support stockings as your doctor says. These help to prevent blood clots. The length of time that you will have to wear them depends on your activity level and the amount of swelling you have.  Most people wear these stockings for 4 to 6 weeks after surgery.   Preventing falls is also very important. To prevent falls:    · Arrange furniture so that you will not trip on it.     · Get rid of throw rugs, and move electrical cords out of the way.     · Walk only in areas with plenty of light.     · Put grab bars in showers and bathtubs.     · Avoid icy or snowy sidewalks.     · Wear shoes with sturdy, flat soles. Follow-up care is a key part of your treatment and safety. Be sure to make and go to all appointments, and call your doctor if you are having problems. It's also a good idea to know your test results and keep a list of the medicines you take. When should you call for help? Call 911 anytime you think you may need emergency care. For example, call if:    · You passed out (lost consciousness).     · You have severe trouble breathing.     · You have sudden chest pain and shortness of breath, or you cough up blood.    Call your doctor now or seek immediate medical care if:    · You have signs that your hip may be dislocated, including:  ? Severe pain and not being able to stand. ? A crooked leg that looks like your hip is out of position. ? Not being able to bend or straighten your leg.     · Your leg or foot is cool or pale or changes color.     · You cannot feel or move your leg.     · You have signs of a blood clot, such as:  ? Pain in your calf, back of the knee, thigh, or groin. ? Redness and swelling in your leg or groin.     · Your incision comes open and begins to bleed, or the bleeding increases.     · You feel like your heart is racing or beating irregularly.     · You have signs of infection, such as:  ? Increased pain, swelling, warmth, or redness. ? Red streaks leading from the incision. ? Pus draining from the incision. ? A fever.    Watch closely for changes in your health, and be sure to contact your doctor if:    · You do not have a bowel movement after taking a laxative.     · You do not get better as expected.    Where can you learn more?  Go to http://foreign-javan.info/. Enter P527 in the search box to learn more about \"Hip Replacement Surgery (Posterior): What to Expect at Home. \"  Current as of: November 29, 2017  Content Version: 11.8  © 4597-5881 [employer]. Care instructions adapted under license by [employer] (which disclaims liability or warranty for this information). If you have questions about a medical condition or this instruction, always ask your healthcare professional. [address] any warranty or liability for your use of this information. These are general instructions for a healthy lifestyle:    No smoking/ No tobacco products/ Avoid exposure to second hand smoke    Surgeon General's Warning:  Quitting smoking now greatly reduces serious risk to your health. Obesity, smoking, and sedentary lifestyle greatly increases your risk for illness    A healthy diet, regular physical exercise & weight monitoring are important for maintaining a healthy lifestyle    You may be retaining fluid if you have a history of heart failure or if you experience any of the following symptoms:  Weight gain of 3 pounds or more overnight or 5 pounds in a week, increased swelling in our hands or feet or shortness of breath while lying flat in bed. Please call your doctor as soon as you notice any of these symptoms; do not wait until your next office visit. Recognize signs and symptoms of STROKE:    F-face looks uneven    A-arms unable to move or move even    S-speech slurred or non-existent    T-time-call 911 as soon as signs and symptoms begin-DO NOT go       Back to bed or wait to see if you get better-TIME IS BRAIN. The discharge information has been reviewed with the patient. The patient verbalized understanding. Information obtained by :  I understand that if any problems occur once I am at home I am to contact my physician.     I understand and acknowledge receipt of the instructions indicated above.                                                                                                                                            Physician's or R.N.'s Signature                                                                  Date/Time                                                                                                                                              Patient or Representative Signature                                                          Date/Time

## 2018-10-18 NOTE — PROGRESS NOTES
[facility]  Face to Face Encounter    Patients Name: Fae Cooks    YOB: 1955    Ordering Physician: Rachael Naik    Primary Diagnosis: Localized osteoarthrosis of right hip [M16.11]  S/p right MAYDA    Date of Face to Face:   10-17-18                             Face to Face Encounter findings are related to primary reason for home care:   yes. 1. I certify that the patient needs intermittent care as follows: physical therapy: gait/stair training    2. I certify that this patient is homebound, that is: 1) patient requires the use of a walker device, special transportation, or assistance of another to leave the home; or 2) patient's condition makes leaving the home medically contraindicated; and 3) patient has a normal inability to leave the home and leaving the home requires considerable and taxing effort. Patient may leave the home for infrequent and short duration for medical reasons, and occasional absences for non-medical reasons. Homebound status is due to the following functional limitations: Patient's ambulation limited secondary to severe pain and requires the use of an assistive device and the assistance of a caregiver for safe completion. Patient with strength and ROM deficits limiting ambulation endurance requiring the use of an assistive device and the assistance of a caregiver. Patient deemed temporarily homebound secondary to increased risk for infection when leaving home and going out into the community. 3. I certify that this patient is under my care and that I, or a nurse practitioner or  [id number], or clinical nurse specialist, or certified nurse midwife, working with me, had a Face-to-Face Encounter that meets the physician Face-to-Face Encounter requirements.   The following are the clinical findings from the [address] [place] [address] encounter that support the need for skilled services and is a summary of the encounter: see hospital chart        Jean Snow RAHAT  10/18/2018      THE FOLLOWING TO BE COMPLETED BY THE COMMUNITY PHYSICIAN:    I concur with the findings described above from the F2F encounter that this patient is homebound and in need of a skilled service.     Certifying Physician: _____________________________________      Printed Certifying Physician Name: _____________________________________    Date: _________________

## 2018-10-18 NOTE — PROGRESS NOTES
Care Management Interventions  Mode of Transport at Discharge: Self  Transition of Care Consult (CM Consult): [address]: Yes  Discharge Durable Medical Equipment: No  Physical Therapy Consult: Yes  Occupational Therapy Consult: Yes  Current Support Network: Lives with Spouse  Confirm Follow Up Transport: Family  Plan discussed with Pt/Family/Caregiver: Yes  Freedom of Choice Offered: Yes  Discharge Location  Discharge Placement: Home with home health    Patient is a 70y.o. year old male admitted for Right MAYDA . Patient lives with His spouse and plans to return home on discharge. Order received to arrange home health. Patient without preference towards agency. Referral sent to [facility]. Patient requesting we arrange a walker and bedside commode. Referral sent to [facility] who will deliver to the hospital room prior to discharge. Will follow until discharge.

## 2018-10-18 NOTE — PROGRESS NOTES
Problem: Self Care Deficits Care Plan (Adult)  Goal: *Acute Goals and Plan of Care (Insert Text)  GOALS:   DISCHARGE GOALS (in preparation for going home/rehab):  3 days  1. Mr. Micky Reynolds will perform one lower body dressing activity with minimal assistance with adaptive equipment to demonstrate improved functional mobility and safety. -GOAL MET 10/18/2018  2. Mr. Micky Reynolds will perform one lower body bathing activity with minimal  assistance with adaptive equipment to demonstrate improved functional mobility and safety. -GOAL MET 10/18/2018  3. Mr. Micky Reynolds will perform toileting/toilet transfer with contact guard assistance with adaptive equipment to demonstrate improved functional mobility and safety. -GOAL MET 10/18/2018  4. Mr. Micky Reynolds will perform shower transfer with contact guard assistance with adaptive equipment to demonstrate improved functional mobility and safety. -GOAL MET 10/18/2018  5. Mr. Micky Reynolds will state MAYDA precautions with two verbal cues to demonstrate improved functional mobility and safety. -GOAL MET 10/18/2018        JOINT CAMP OCCUPATIONAL THERAPY MAYDA: Discharge and Treatment Day: 2nd 10/18/2018  INPATIENT: Hospital Day: 2  Payor: Jerome Hall / Plan: [place] [employer]UE [employer] / Product Type: Dianne Folds /      NAME/AGE/GENDER: Saúl Teran is a 70 y.o. male   PRIMARY DIAGNOSIS:  Localized osteoarthrosis of right hip [M16.11]   Procedure(s) and Anesthesia Type:     * RIGHT HIP ARTHROPLASTY TOTAL / [address] - Spinal (Right)  ICD-10: Treatment Diagnosis:    · Pain in Right Hip (M25.551)  · Stiffness of Right Hip, Not elsewhere classified (M25.651)      ASSESSMENT:     Mr. Micky Reynolds is s/p Right MAYDA and presents with decreased weight bearing on R LE and decreased independence with functional mobility and activities of daily living.   Patient completed shower and dressing as charter below in ADL grid and is ambulating with rolling walker and supervision assist.  Patient has met 5/5 goals and plans to return home with good family support. Family able to provide patient with appropriate level of assistance at this time. OT reviewed hip precautions throughout session and issued long handled sponge for home use. Patient motivated and hard working. Will be safe to return home with daughter. D/C OT for acute deficits. This section established at most recent assessment   PROBLEM LIST (Impairments causing functional limitations):  1. Decreased Strength  2. Decreased ADL/Functional Activities  3. Decreased Transfer Abilities  4. Increased Pain  5. Increased Fatigue  6. Decreased Flexibility/Joint Mobility  7. Decreased Knowledge of Precautions   INTERVENTIONS PLANNED: (Benefits and precautions of occupational therapy have been discussed with the patient.)  1. Activities of daily living training  2. Adaptive equipment training  3. Balance training  4. Clothing management  5. Donning&doffing training  6. Theraputic activity     TREATMENT PLAN: Frequency/Duration: Follow patient 1-2tx to address above goals. Rehabilitation Potential For Stated Goals: Excellent     RECOMMENDED REHABILITATION/EQUIPMENT: (at time of discharge pending progress): Continue Skilled Therapy. OCCUPATIONAL PROFILE AND HISTORY:   History of Present Injury/Illness (Reason for Referral): Pt presents this date s/p (right) MAYDA. Past Medical History/Comorbidities:   Mr. Lynn Saldana  has a past medical history of Arthritis, GERD (gastroesophageal reflux disease), HTN (hypertension), Hyperlipidemia, PAC (premature atrial contraction), and Palpitations. Mr. Lynn Saldana  has no past surgical history on file.   Social History/Living Environment:   Home Environment: Private residence  # Steps to Enter: 0  One/Two Story Residence: Two story, live on 1st floor  # of Interior Steps: 17  Interior Rails: Left  Living Alone: No  Support Systems: Child(sabrina)  Patient Expects to be Discharged to[employer]de[employer] residence  Current DME Used/Available at Home: None  Prior Level of Function/Work/Activity:  Independent prior. Number of Personal Factors/Comorbidities that affect the Plan of Care: Brief history (0):  LOW COMPLEXITY   ASSESSMENT OF OCCUPATIONAL PERFORMANCE[de-identified]   Most Recent Physical Functioning:                                  Mental Status  Neurologic State: Alert  Orientation Level: Oriented X4  Cognition: Appropriate decision making  Perception: Appears intact  Perseveration: No perseveration noted                Basic ADLs (From Assessment) Complex ADLs (From Assessment)   Basic ADL  Feeding: Independent  Oral Facial Hygiene/Grooming: Setup  Bathing: Stand-by assistance  Type of Bath: Chlorhexidine (CHG), Full, Shower  Upper Body Dressing: Stand-by assistance  Lower Body Dressing: Minimum assistance  Toileting: Supervision     Grooming/Bathing/Dressing Activities of Daily Living   Grooming  Grooming Assistance: Independent     Upper Body Bathing  Bathing Assistance: Independent     Lower Body Bathing  Bathing Assistance: Stand-by assistance     Upper Body Dressing Assistance  Dressing Assistance: Independent Functional Transfers  Bathroom Mobility: Contact guard assistance  Toilet Transfer : Stand-by assistance  Shower Transfer: Supervision   Lower Body Dressing Assistance  Dressing Assistance: Minimum assistance  Underpants: Supervision/set-up  Pants With Elastic Waist: Supervision/set-up  Socks: Moderate assistance  Shoes with Cloth Laces: Moderate assistance Bed/Mat Mobility  Supine to Sit: Supervision  Sit to Supine: (left up in chair)  Sit to Stand: Supervision  Bed to Chair: Supervision  Scooting: Stand-by assistance         Physical Skills Involved:  1. Range of Motion  2. Balance  3. Strength Cognitive Skills Affected (resulting in the inability to perform in a timely and safe manner): 1. wfl Psychosocial Skills Affected:   1. wfl   Number of elements that affect the Plan of Care: 1-3:  LOW COMPLEXITY CLINICAL DECISION MAKING:   [facility] AM-PAC 6 Clicks   Daily Activity Inpatient Short Form  How much help from another person does the patient currently need. .. Total A Lot A Little None   1. Putting on and taking off regular lower body clothing? [] 1   [] 2   [x] 3   [] 4   2. Bathing (including washing, rinsing, drying)? [] 1   [] 2   [x] 3   [] 4   3. Toileting, which includes using toilet, bedpan or urinal?   [] 1   [] 2   [x] 3   [] 4   4. Putting on and taking off regular upper body clothing? [] 1   [] 2   [] 3   [x] 4   5. Taking care of personal grooming such as brushing teeth? [] 1   [] 2   [] 3   [x] 4   6. Eating meals? [] 1   [] 2   [] 3   [x] 4   © 2007, Trustees of [facility], under license to [email or web address]. All rights reserved     Score:  Initial: 17 Most Recent: 21 (Date: 10/18/2018 )    Interpretation of Tool:  Represents activities that are increasingly more difficult (i.e. Bed mobility, Transfers, Gait). Score 24 23 22-20 19-15 14-10 9-7 6     Modifier CH CI CJ CK CL CM CN      ? Self Care:     - CURRENT STATUS: CJ - 20%-39% impaired, limited or restricted    - GOAL STATUS: CJ - 20%-39% impaired, limited or restricted    - D/C STATUS:  CJ - 20%-39% impaired, limited or restricted  Payor: BLUE CROSS / [place] [facility] Billy Leonardo / Product Type: ERIKA /         Use of outcome tool(s) and clinical judgement create a POC that gives a: MODERATE COMPLEXITY            TREATMENT:   (In addition to Assessment/Re-Assessment sessions the following treatments were rendered)     Pre-treatment Symptoms/Complaints:    Pain: Initial:   Pain Intensity 1: 2  Post Session:  2     Self Care: (40): Procedure(s) (per grid) utilized to improve and/or restore self-care/home management as related to dressing, bathing, toileting and grooming. Required minimal verbal cueing to facilitate activities of daily living skills.       Treatment/Session Assessment: Response to Treatment:  Good, sitting up in recliner. Education:  [] Home Exercises  [x] Fall Precautions  [x] Hip Precautions [] Going Home Video  [] Knee/Hip Prosthesis Review  [x] Walker Management/Safety [x] Adaptive Equipment as Needed       Interdisciplinary Collaboration:   o Physical Therapist  o Occupational Therapist  o Registered Nurse    After treatment position/precautions:   o Up in chair  o Bed/Chair-wheels locked  o Caregiver at bedside  o Call light within reach  o RN notified     Compliance with Program/Exercises: compliant all of the time. Recommendations/Intent for next treatment session:  D/C for acute deficits.        Total Treatment Duration:  OT Patient Time In/Time Out  Time In: 0920  Time Out: Bobo 37, OT

## 2018-10-18 NOTE — PROGRESS NOTES
Patient placed on continuous sat monitor #[id number] and is on room air. Monitor history deleted prior to placing on patient.  Patient working on IS

## 2018-10-18 NOTE — PROGRESS NOTES
Given multiple prescriptions with instruction sheet and instructed how to take each, which were required and which were optional. Home health to see pt for therapy. Reminded of hip precautions. Has follow up appt with Dr Julita Bourne in a couple weeks. Instructed to call doctor if having fever, excessive drainage, numbness or other problems. I have reviewed discharge instructions with the patient and daughter in law. The patient and daughter in law verbalized understanding.

## 2018-10-19 ENCOUNTER — HOME CARE VISIT (OUTPATIENT)
Dept: SCHEDULING | Facility: HOME HEALTH | Age: 63
End: 2018-10-19
Payer: COMMERCIAL

## 2018-10-19 VITALS
TEMPERATURE: 98.7 F | DIASTOLIC BLOOD PRESSURE: 70 MMHG | OXYGEN SATURATION: 96 % | HEART RATE: 71 BPM | SYSTOLIC BLOOD PRESSURE: 118 MMHG | RESPIRATION RATE: 17 BRPM

## 2018-10-19 VITALS
TEMPERATURE: 98 F | OXYGEN SATURATION: 96 % | RESPIRATION RATE: 18 BRPM | DIASTOLIC BLOOD PRESSURE: 70 MMHG | HEART RATE: 72 BPM | SYSTOLIC BLOOD PRESSURE: 106 MMHG

## 2018-10-19 PROCEDURE — 400013 HH SOC

## 2018-10-19 PROCEDURE — G0151 HHCP-SERV OF PT,EA 15 MIN: HCPCS

## 2018-10-19 PROCEDURE — G0299 HHS/HOSPICE OF RN EA 15 MIN: HCPCS

## 2018-10-23 ENCOUNTER — HOME CARE VISIT (OUTPATIENT)
Dept: SCHEDULING | Facility: HOME HEALTH | Age: 63
End: 2018-10-23
Payer: COMMERCIAL

## 2018-10-23 VITALS
DIASTOLIC BLOOD PRESSURE: 66 MMHG | TEMPERATURE: 97.3 F | HEART RATE: 76 BPM | SYSTOLIC BLOOD PRESSURE: 118 MMHG | RESPIRATION RATE: 19 BRPM

## 2018-10-23 VITALS
RESPIRATION RATE: 18 BRPM | SYSTOLIC BLOOD PRESSURE: 131 MMHG | OXYGEN SATURATION: 98 % | HEART RATE: 86 BPM | DIASTOLIC BLOOD PRESSURE: 83 MMHG | TEMPERATURE: 98.4 F

## 2018-10-23 PROCEDURE — G0157 HHC PT ASSISTANT EA 15: HCPCS

## 2018-10-23 PROCEDURE — G0299 HHS/HOSPICE OF RN EA 15 MIN: HCPCS

## 2018-10-24 ENCOUNTER — HOME CARE VISIT (OUTPATIENT)
Dept: SCHEDULING | Facility: HOME HEALTH | Age: 63
End: 2018-10-24
Payer: COMMERCIAL

## 2018-10-24 VITALS
TEMPERATURE: 97.5 F | SYSTOLIC BLOOD PRESSURE: 124 MMHG | RESPIRATION RATE: 20 BRPM | HEART RATE: 79 BPM | DIASTOLIC BLOOD PRESSURE: 70 MMHG

## 2018-10-24 PROCEDURE — G0157 HHC PT ASSISTANT EA 15: HCPCS

## 2018-10-26 ENCOUNTER — HOME CARE VISIT (OUTPATIENT)
Dept: SCHEDULING | Facility: HOME HEALTH | Age: 63
End: 2018-10-26
Payer: COMMERCIAL

## 2018-10-26 VITALS
DIASTOLIC BLOOD PRESSURE: 64 MMHG | TEMPERATURE: 96.4 F | RESPIRATION RATE: 20 BRPM | HEART RATE: 76 BPM | SYSTOLIC BLOOD PRESSURE: 122 MMHG

## 2018-10-26 PROCEDURE — G0157 HHC PT ASSISTANT EA 15: HCPCS

## 2018-10-30 ENCOUNTER — HOME CARE VISIT (OUTPATIENT)
Dept: SCHEDULING | Facility: HOME HEALTH | Age: 63
End: 2018-10-30
Payer: COMMERCIAL

## 2018-10-30 PROCEDURE — G0157 HHC PT ASSISTANT EA 15: HCPCS

## 2018-11-01 ENCOUNTER — HOME CARE VISIT (OUTPATIENT)
Dept: SCHEDULING | Facility: HOME HEALTH | Age: 63
End: 2018-11-01
Payer: COMMERCIAL

## 2018-11-01 VITALS
SYSTOLIC BLOOD PRESSURE: 110 MMHG | HEART RATE: 90 BPM | DIASTOLIC BLOOD PRESSURE: 86 MMHG | TEMPERATURE: 97.9 F | RESPIRATION RATE: 18 BRPM | OXYGEN SATURATION: 96 %

## 2018-11-05 ENCOUNTER — HOSPITAL ENCOUNTER (OUTPATIENT)
Dept: PHYSICAL THERAPY | Age: 63
Discharge: HOME | End: 2018-11-05
Payer: COMMERCIAL

## 2018-11-05 PROCEDURE — 97162 PT EVAL MOD COMPLEX 30 MIN: CPT

## 2018-11-05 PROCEDURE — 97110 THERAPEUTIC EXERCISES: CPT

## 2018-11-05 PROCEDURE — 97140 MANUAL THERAPY 1/> REGIONS: CPT

## 2018-11-05 NOTE — PROGRESS NOTES
Ambulatory/Rehab Services H2 Model Falls Risk Assessment    Risk Factor Pts. ·   Confusion/Disorientation/Impulsivity  []    4 ·   Symptomatic Depression  []   2 ·   Altered Elimination  []   1 ·   Dizziness/Vertigo  []   1 ·   Gender (Male)  [x]   1 ·   Any administered antiepileptics (anticonvulsants):  [x]   2 ·   Any administered benzodiazepines:  []   1 ·   Visual Impairment (specify):  []   1 ·   Portable Oxygen Use  []   1 ·   Orthostatic ? BP  []   1 ·   History of Recent Falls (within 3 mos.)  []   5     Ability to Rise from Chair (choose one) Pts. ·   Ability to rise in a single movement  []   0 ·   Pushes up, successful in one attempt  [x]   1 ·   Multiple attempts, but successful  []   3 ·   Unable to rise without assistance  []   4   Total: (5 or greater = High Risk) 4     Falls Prevention Plan:   []                Physical Limitations to Exercise (specify):   []                Mobility Assistance Device (type):   []                Exercise/Equipment Adaptation (specify):    ©2010 [facility] [address] Patent #[id number].  Federal Law prohibits the replication, distribution or use without written permission from [facility] [employer]

## 2018-11-14 ENCOUNTER — HOSPITAL ENCOUNTER (OUTPATIENT)
Dept: PHYSICAL THERAPY | Age: 63
Discharge: HOME | End: 2018-11-14
Payer: COMMERCIAL

## 2018-11-14 PROCEDURE — 97110 THERAPEUTIC EXERCISES: CPT

## 2018-11-14 PROCEDURE — 97140 MANUAL THERAPY 1/> REGIONS: CPT

## 2018-11-14 NOTE — PROGRESS NOTES
Pari Boone  : 1955  Primary: [employer]*  Secondary:  [address] at [place] [address], [place], [address]  [phone]   [phone]       OUTPATIENT PHYSICAL THERAPY:Daily Note 2018    ICD-10: Treatment Diagnosis: M25.551 pain in joint/R hip and R26.89 other abnormalities of gait and mobility and Z96.649 presence of unspecified artificial hip joint   Precautions: excessive hip adduction  Allergies: Other plant, animal, environmental  Fall Risk Score: 4 (? 5 = High Risk)  MD Orders: evaluate and treat  MEDICAL/REFERRING DIAGNOSIS:  Presence of unspecified artificial hip joint [Z96.649]   DATE OF ONSET: 10-17-18  REFERRING PHYSICIAN: Eladio Barker MD  RETURN PHYSICIAN APPOINTMENT: 18     INITIAL ASSESSMENT:  Mr. Raysa Eduardo is a 70 y.o. male presenting to physical therapy with complaints of R hip pain and soreness with abnormality of gait since receiving a R MAYDA on 10-17-18-pain in R hip is 3/10 with no radicular symptoms /no numbness into lower R leg-noted tightness in R gluteus medius and R IT band -incision looks good and is mildly warm but not hot-patient ambulates into dept with a standard cane and minimal+antalgic gait-hip range of motion is wfl with mild limitations with active hip abduction/mild pain inhibition-mild decreased strength in R LE-very good candidate for skilled physical therapy to include manual therapy, therapeutic exercises and cold pack and gait training -motivated patient who should progress with therapy       PROBLEM LIST (Impacting functional limitations):  1. Decreased Strength  2. Decreased ADL/Functional Activities  3. Decreased Transfer Abilities  4. Decreased Ambulation Ability/Technique  5. Decreased Balance  6. Increased Pain  7. Decreased Activity Tolerance  8. Decreased Pacing Skills  9. Decreased Flexibility/Joint Mobility INTERVENTIONS PLANNED:  1. Cold  2. Electrical Stimulation  3.  Gait Training  4. Heat  5. Home Exercise Program (HEP)  6. Manual Therapy  7. Range of Motion (ROM)  8. Therapeutic Exercise/Strengthening   TREATMENT PLAN:  Effective Dates: 11/5/2018 TO 12/5/2018 (30 days). Frequency/Duration: 1 time a week for 30 Days  GOALS: (Goals have been discussed and agreed upon with patient.)  Short-Term Functional Goals: Time Frame: 11-19-18  1. R hip pain is less than 3/10 to progress to DC goal   2. Hip range of motion is wfl with mild pain inhibition  3. R LE strength is at least 4-/5 to allow increased ADLs   Instruction in exercise program to allow increased ADLs. Ambulate with cane with minimal antalgic gait   Discharge Goals: Time Frame: 12-5-18  1. R hip pain is minimal at 1/10 to allow most home ADLs including light lifting   2. Hip range of motion is wfl to allow full personal care ability including putting on shoes and socks and getting in and out of car with driving   3. Independent ambulation with minimal to no antalgic gait to allow walking community distances   4. Able to sleep up to 6-7 hours including sleeping on R side without wakened by hip pain   5. Able to walk/stand for greater than 1 hour   6. LEFS score is improved from 62/80 to 74/80  7. Rehabilitation Potential For Stated Goals: Good  Regarding [facility], I certify that the treatment plan above will be carried out by a therapist or under their direction. Thank you for this referral,  Mena Ruggiero, PT                   Date                    The information in this section was collected on 11-5-18 (except where otherwise noted). HISTORY:   History of Present Injury/Illness (Reason for Referral):  R MAYDA on 10-17-18 -R hip soreness with abnormality of gait   Past Medical History/Comorbidities:   Mr. Medardo Bermudez  has a past medical history of Arthritis, GERD (gastroesophageal reflux disease), HTN (hypertension), Hyperlipidemia, PAC (premature atrial contraction), and Palpitations.   Mr. Medardo Bermudez  has a past surgical history that includes RIGHT HIP ARTHROPLASTY TOTAL / [address] (Right, 10/17/2018). Social History/Living Environment:      Prior Level of Function/Work/Activity:  Independent with home ADLs and walking   Current Medications:       Current Outpatient Medications:     calcium/vitamin D2/zinc/chrom (CALCIUM,VIT D,CHROMIUM,ZINC PO), 2 Tabs by Mouth/Throat route daily. , Disp: , Rfl:     acetaminophen (TYLENOL) 500 mg tablet, Take 2 Tabs by mouth every six (6) hours. , Disp: 120 Tab, Rfl: 0    aspirin delayed-release 325 mg tablet, Take 1 Tab by mouth every twelve (12) hours every twelve (12) hours. , Disp: 60 Tab, Rfl: 0    cyclobenzaprine (FLEXERIL) 10 mg tablet, Take 0.5 Tabs by mouth three (3) times daily. , Disp: 30 Tab, Rfl: 0    gabapentin (NEURONTIN) 100 mg capsule, Take 1 Cap by mouth two (2) times a day., Disp: 30 Cap, Rfl: 0    HYDROmorphone (DILAUDID) 2 mg tablet, Take 1 Tab by mouth every four (4) hours as needed. Max Daily Amount: 12 mg., Disp: 42 Tab, Rfl: 0    ondansetron (ZOFRAN ODT) 8 mg disintegrating tablet, Take 1 Tab by mouth every eight (8) hours as needed for Nausea., Disp: 30 Tab, Rfl: 0    senna-docusate (PERICOLACE) 8.6-50 mg per tablet, Take 2 Tabs by mouth daily. , Disp: 120 Tab, Rfl: 0    zolpidem (AMBIEN) 5 mg tablet, Take 1 Tab by mouth nightly as needed for Sleep. Max Daily Amount: 5 mg., Disp: 30 Tab, Rfl: 0    MULTIVITAMIN PO, Take  by mouth daily. , Disp: , Rfl:     beta-carotene,A,-vits C and E (ANTIOXIDANT PO), Take  by mouth daily. , Disp: , Rfl:     OTHER,NON-FORMULARY,, Calcium, magnesium, zinc and vitamin D3 OTC supplement. 1 tab by mouth daily. , Disp: , Rfl:     amLODIPine-benazepril (LOTREL) 2.5-10 mg per capsule, Take 1 Cap by mouth daily. , Disp: 80 Cap, Rfl: 3   Date Last Reviewed:  11/14/2018   Number of Personal Factors/Comorbidities that affect the Plan of Care:  (patient has a history of GERD, HTN, and arthritis 1-2: MODERATE COMPLEXITY   EXAMINATION:   Observation/Orthostatic Postural Assessment:          Patient ambulates with cane and minimal + antalgic gait with mild rounded shoulders and forward head on neck posture   Palpation:          Tight R gluteus medius /at incision site and at R IT band   ROM:          R ankle and knee range of motion is wfl     R hip range of motion is wfl in flexion and extension actively but 66% active hip abduction with mild pain inhibition  Strength: Ankles are 4/5     R quads and hamstrings are 3+/5    L quads and hamstrings are 4/5     L hip flexor/extension/abduction is 4 to 4+/5     R hip flexors/extensors are 3+ to 4-/5  With R hip abductors at 3+/5     Special Tests:         Negative homans sign  Balance:        Good actively with cane -good minus without cane   Mental Status:        Alert and oriented ( understands some English and follows hand gestures well )  Sensation:     Intact to light touch    Body Structures Involved:  1. Bones  2. Joints  3. Muscles Body Functions Affected:  1. Voice and Speech  2. Neuromusculoskeletal  3. Movement Related Activities and Participation Affected:  1. Learning and Applying Knowledge  2. General Tasks and Demands  3. Mobility  4. Self Care  5. Domestic Life   Number of elements (examined above) that affect the Plan of Care: 3: MODERATE COMPLEXITY   CLINICAL PRESENTATION:   Presentation: Evolving clinical presentation with changing clinical characteristics: MODERATE COMPLEXITY   CLINICAL DECISION MAKING:   Outcome Measure: Tool Used: Lower Extremity Functional Scale (LEFS)  Score:  Initial: 62/80 Most Recent: X/80 (Date: -- )   Interpretation of Score: 20 questions each scored on a 5 point scale with 0 representing \"extreme difficulty or unable to perform\" and 4 representing \"no difficulty\". The lower the score, the greater the functional disability. 80/80 represents no disability. Minimal detectable change is 9 points.   Score 80 79-63 62-48 47-32 31-16 15-1 0   Modifier CH CI CJ CK CL CM CN         Medical Necessity:   · Patient is expected to demonstrate progress in strength, range of motion, balance, coordination and functional technique to increase independence with ADLs and improve safety during walking and transfers. · Skilled intervention continues to be required due to R hip pain and stiffness following R MAYDA is affecting gait and function. Reason for Services/Other Comments:  · Patient continues to require modification of therapeutic interventions to increase complexity of exercises. Use of outcome tool(s) and clinical judgement create a POC that gives a:  (outcome measure score illustrates up to 39% impairment ) Questionable prediction of patient's progress: MODERATE COMPLEXITY            TREATMENT:   (In addition to Assessment/Re-Assessment sessions the following treatments were rendered)  Pre-treatment Symptoms/Complaints:  I was a bit sore after doing my exercises -I have been limping for the last 7 years  Pain: Initial:   Pain Intensity 1: 2  Pain Location 1: Hip  Pain Orientation 1: Lateral, Right  Pain Intervention(s) 1: Cold pack, Exercise, Heat applied, Massage  Post Session: 1/10 -relief with manual therapy-performed exercises well -patient to ice at home     Therapeutic Exercise: ( ): x 45 minutes       Exercises per grid below to improve mobility, strength, balance and coordination. Required minimal verbal cues to promote proper body alignment and promote proper body posture. Progressed resistance, range, repetitions and complexity of movement as indicated.    Date:  11-5-18 Date:  11-14-18 Date:     Activity/Exercise Parameters Parameters Parameters   Gastrocnemius stretch 2 x 30 seconds  2 x 30 seconds    Ankle pumps X 20 X 20    Quad sets X 10 with 3 second hold X 10 with 3 second hold    Gluteal sets X 10 with 3 second hold X 10 with 3 second hold    Heel slides X 5 X 20    Hip abduction X 10 with black band X 20 with black band Hip adduction (mild with theraball ) X 10 with yellow theraball 2 x 10 with theraball    Marching in place X 10 with black band X 20 with 3 lbs    Sit to stand 2 x 5 X 12    Long arc quads X 20 with 3 lbs X 20 with 3 lbs    Standing hamstring curls X 20 with 3 lbs X 20 with 3 lbs    Weight shifts  3 x 30 seconds    Gait training  X 5 minutes in hallway/mirror    Nu step  X 10 minutes at level 2     Seated hamstring curls with eccentric knee extension  X 20 with green band    Partial squats reviewed reviewed    Standing hip abduction X 10 with 3 lbs X 20 with 3 lbs    Standing hip extension X 10 with 3 lbs X 20 with 3 lbs    Standing hip flexion X 10 with 3 lbs X 20 with 3 lbs        Manual Therapy (    Soft Tissue Mobilization Duration  Duration: 12 Minutes to R gluteus medius and IT band while supine -effleurage and cross friction for tightness    Therapeutic Modalities: for pain and edema    Patient to ice at home                                                                                               HEP: As above; handouts given to patient for all exercises. Treatment/Session Assessment:    · Response to Treatment:  Good relief with manual therapy and exercises -daughter in law translated  -motivated patient  ·   · Compliance with Program/Exercises: compliant most of the time. · Recommendations/Intent for next treatment session: \"Next visit will focus on advancements to more challenging activities\". push hip/LE strengthening and gait training followed with cold pack     Total Treatment Duration:57 minutes      PT Patient Time In/Time Out  Time In: 1630  Time Out: [address], PT

## 2018-11-20 ENCOUNTER — HOSPITAL ENCOUNTER (OUTPATIENT)
Dept: PHYSICAL THERAPY | Age: 63
Discharge: HOME | End: 2018-11-20
Payer: COMMERCIAL

## 2018-11-20 PROCEDURE — 97110 THERAPEUTIC EXERCISES: CPT

## 2018-11-20 PROCEDURE — 97140 MANUAL THERAPY 1/> REGIONS: CPT

## 2018-11-20 NOTE — PROGRESS NOTES
Eugene Crowley  : 1955  Primary: [facility]*  Secondary:  [address] at [address], [address] [place] [address] Street  [phone]   [phone]       OUTPATIENT PHYSICAL THERAPY:Daily Note 2018    ICD-10: Treatment Diagnosis: M25.551 pain in joint/R hip and R26.89 other abnormalities of gait and mobility and Z96.649 presence of unspecified artificial hip joint   Precautions: excessive hip adduction  Allergies: Other plant, animal, environmental  Fall Risk Score: 4 (? 5 = High Risk)  MD Orders: evaluate and treat  MEDICAL/REFERRING DIAGNOSIS:  Presence of unspecified artificial hip joint [Z96.649]   DATE OF ONSET: 10-17-18  REFERRING PHYSICIAN: Liz Gee MD  RETURN PHYSICIAN APPOINTMENT: 18     INITIAL ASSESSMENT:  Mr. Carmencita Becerra is a 70 y.o. male presenting to physical therapy with complaints of R hip pain and soreness with abnormality of gait since receiving a R MAYDA on 10-17-18-pain in R hip is 3/10 with no radicular symptoms /no numbness into lower R leg-noted tightness in R gluteus medius and R IT band -incision looks good and is mildly warm but not hot-patient ambulates into dept with a standard cane and minimal+antalgic gait-hip range of motion is wfl with mild limitations with active hip abduction/mild pain inhibition-mild decreased strength in R LE-very good candidate for skilled physical therapy to include manual therapy, therapeutic exercises and cold pack and gait training -motivated patient who should progress with therapy       PROBLEM LIST (Impacting functional limitations):  1. Decreased Strength  2. Decreased ADL/Functional Activities  3. Decreased Transfer Abilities  4. Decreased Ambulation Ability/Technique  5. Decreased Balance  6. Increased Pain  7. Decreased Activity Tolerance  8. Decreased Pacing Skills  9. Decreased Flexibility/Joint Mobility INTERVENTIONS PLANNED:  1. Cold  2. Electrical Stimulation  3.  Gait Training  4. Heat  5. Home Exercise Program (HEP)  6. Manual Therapy  7. Range of Motion (ROM)  8. Therapeutic Exercise/Strengthening   TREATMENT PLAN:  Effective Dates: 11/5/2018 TO 12/5/2018 (30 days). Frequency/Duration: 1 time a week for 30 Days  GOALS: (Goals have been discussed and agreed upon with patient.)  Short-Term Functional Goals: Time Frame: 11-19-18  1. R hip pain is less than 3/10 to progress to DC goal   2. Hip range of motion is wfl with mild pain inhibition  3. R LE strength is at least 4-/5 to allow increased ADLs   Instruction in exercise program to allow increased ADLs. Ambulate with cane with minimal antalgic gait   Discharge Goals: Time Frame: 12-5-18  1. R hip pain is minimal at 1/10 to allow most home ADLs including light lifting   2. Hip range of motion is wfl to allow full personal care ability including putting on shoes and socks and getting in and out of car with driving   3. Independent ambulation with minimal to no antalgic gait to allow walking community distances   4. Able to sleep up to 6-7 hours including sleeping on R side without wakened by hip pain   5. Able to walk/stand for greater than 1 hour   6. LEFS score is improved from 62/80 to 74/80  7. Rehabilitation Potential For Stated Goals: Good  Regarding [employer], I certify that the treatment plan above will be carried out by a therapist or under their direction. Thank you for this referral,  Yuri Avendano PTA                   Date                    The information in this section was collected on 11-5-18 (except where otherwise noted). HISTORY:   History of Present Injury/Illness (Reason for Referral):  R MAYDA on 10-17-18 -R hip soreness with abnormality of gait   Past Medical History/Comorbidities:   Mr. Mylene Lacy  has a past medical history of Arthritis, GERD (gastroesophageal reflux disease), HTN (hypertension), Hyperlipidemia, PAC (premature atrial contraction), and Palpitations.   Mr. Mylene Lacy has a past surgical history that includes RIGHT HIP ARTHROPLASTY TOTAL / [address] (Right, 10/17/2018). Social History/Living Environment:      Prior Level of Function/Work/Activity:  Independent with home ADLs and walking   Current Medications:       Current Outpatient Medications:     calcium/vitamin D2/zinc/chrom (CALCIUM,VIT D,CHROMIUM,ZINC PO), 2 Tabs by Mouth/Throat route daily. , Disp: , Rfl:     acetaminophen (TYLENOL) 500 mg tablet, Take 2 Tabs by mouth every six (6) hours. , Disp: 120 Tab, Rfl: 0    aspirin delayed-release 325 mg tablet, Take 1 Tab by mouth every twelve (12) hours every twelve (12) hours. , Disp: 60 Tab, Rfl: 0    cyclobenzaprine (FLEXERIL) 10 mg tablet, Take 0.5 Tabs by mouth three (3) times daily. , Disp: 30 Tab, Rfl: 0    gabapentin (NEURONTIN) 100 mg capsule, Take 1 Cap by mouth two (2) times a day., Disp: 30 Cap, Rfl: 0    HYDROmorphone (DILAUDID) 2 mg tablet, Take 1 Tab by mouth every four (4) hours as needed. Max Daily Amount: 12 mg., Disp: 42 Tab, Rfl: 0    ondansetron (ZOFRAN ODT) 8 mg disintegrating tablet, Take 1 Tab by mouth every eight (8) hours as needed for Nausea., Disp: 30 Tab, Rfl: 0    senna-docusate (PERICOLACE) 8.6-50 mg per tablet, Take 2 Tabs by mouth daily. , Disp: 120 Tab, Rfl: 0    zolpidem (AMBIEN) 5 mg tablet, Take 1 Tab by mouth nightly as needed for Sleep. Max Daily Amount: 5 mg., Disp: 30 Tab, Rfl: 0    MULTIVITAMIN PO, Take  by mouth daily. , Disp: , Rfl:     beta-carotene,A,-vits C and E (ANTIOXIDANT PO), Take  by mouth daily. , Disp: , Rfl:     OTHER,NON-FORMULARY,, Calcium, magnesium, zinc and vitamin D3 OTC supplement. 1 tab by mouth daily. , Disp: , Rfl:     amLODIPine-benazepril (LOTREL) 2.5-10 mg per capsule, Take 1 Cap by mouth daily. , Disp: 80 Cap, Rfl: 3   Date Last Reviewed:  11/20/2018   Number of Personal Factors/Comorbidities that affect the Plan of Care:  (patient has a history of GERD, HTN, and arthritis 1-2: MODERATE COMPLEXITY   EXAMINATION:   Observation/Orthostatic Postural Assessment:          Patient ambulates with cane and minimal + antalgic gait with mild rounded shoulders and forward head on neck posture   Palpation:          Tight R gluteus medius /at incision site and at R IT band   ROM:          R ankle and knee range of motion is wfl     R hip range of motion is wfl in flexion and extension actively but 66% active hip abduction with mild pain inhibition  Strength: Ankles are 4/5     R quads and hamstrings are 3+/5    L quads and hamstrings are 4/5     L hip flexor/extension/abduction is 4 to 4+/5     R hip flexors/extensors are 3+ to 4-/5  With R hip abductors at 3+/5     Special Tests:         Negative homans sign  Balance:        Good actively with cane -good minus without cane   Mental Status:        Alert and oriented ( understands some English and follows hand gestures well )  Sensation:     Intact to light touch    Body Structures Involved:  1. Bones  2. Joints  3. Muscles Body Functions Affected:  1. Voice and Speech  2. Neuromusculoskeletal  3. Movement Related Activities and Participation Affected:  1. Learning and Applying Knowledge  2. General Tasks and Demands  3. Mobility  4. Self Care  5. Domestic Life   Number of elements (examined above) that affect the Plan of Care: 3: MODERATE COMPLEXITY   CLINICAL PRESENTATION:   Presentation: Evolving clinical presentation with changing clinical characteristics: MODERATE COMPLEXITY   CLINICAL DECISION MAKING:   Outcome Measure: Tool Used: Lower Extremity Functional Scale (LEFS)  Score:  Initial: 62/80 Most Recent: X/80 (Date: -- )   Interpretation of Score: 20 questions each scored on a 5 point scale with 0 representing \"extreme difficulty or unable to perform\" and 4 representing \"no difficulty\". The lower the score, the greater the functional disability. 80/80 represents no disability. Minimal detectable change is 9 points.   Score 80 79-63 62-48 47-32 31-16 15-1 0   Modifier CH CI CJ CK CL CM CN         Medical Necessity:   · Patient is expected to demonstrate progress in strength, range of motion, balance, coordination and functional technique to increase independence with ADLs and improve safety during walking and transfers. · Skilled intervention continues to be required due to R hip pain and stiffness following R MAYDA is affecting gait and function. Reason for Services/Other Comments:  · Patient continues to require modification of therapeutic interventions to increase complexity of exercises. Use of outcome tool(s) and clinical judgement create a POC that gives a:  (outcome measure score illustrates up to 39% impairment ) Questionable prediction of patient's progress: MODERATE COMPLEXITY            TREATMENT:   (In addition to Assessment/Re-Assessment sessions the following treatments were rendered)  Pre-treatment Symptoms/Complaints:  Patient reports minimal to no pain. Pain: Initial:   Pain Intensity 1: 1  Pain Location 1: Hip  Pain Orientation 1: Lateral, Right  Post Session: 1/10      Therapeutic Exercise: (40 Minutes):        Exercises per grid below to improve mobility, strength, balance and coordination. Required minimal verbal cues to promote proper body alignment and promote proper body posture. Progressed resistance, range, repetitions and complexity of movement as indicated.    Date:  11-5-18 Date:  11-14-18 Date:  11-20-18   Activity/Exercise Parameters Parameters Parameters   Gastrocnemius stretch 2 x 30 seconds  2 x 30 seconds    Ankle pumps X 20 X 20    Quad sets X 10 with 3 second hold X 10 with 3 second hold    Gluteal sets X 10 with 3 second hold X 10 with 3 second hold    Heel slides X 5 X 20 2 x 10   Hip abduction X 10 with black band X 20 with black band Black 3 x 10   Hip adduction (mild with theraball ) X 10 with yellow theraball 2 x 10 with theraball Ball 3 x 10   Marching in place X 10 with black band X 20 with 3 lbs    Sit to stand 2 x 5 X 12 2 x 10   Long arc quads X 20 with 3 lbs X 20 with 3 lbs 4 lbs 2 x 15   Standing hamstring curls X 20 with 3 lbs X 20 with 3 lbs 4 lbs 3 x 10   Weight shifts  3 x 30 seconds    Gait training  X 5 minutes in hallway/mirror    Nu step  X 10 minutes at level 2  unavailable   Seated hamstring curls with eccentric knee extension  X 20 with green band Black 3 x 10   Partial squats reviewed reviewed 2 x 10   Standing hip abduction X 10 with 3 lbs X 20 with 3 lbs 4 lbs 2 x 10   Standing hip extension X 10 with 3 lbs X 20 with 3 lbs 4 lbs 2 x 10   Standing hip flexion X 10 with 3 lbs X 20 with 3 lbs 4 lbs 2 x 10   bridging   2 x 5                   Reviewed hip precautions and answered several questions about activities\      Manual Therapy (    Soft Tissue Mobilization Duration  Duration: 15 Minutes  To right hip musculature and scar in left side lying. Therapeutic Modalities: for pain and edema    Patient to ice at home      Right Hip Heat  Type: Moist pack  Duration : 5 minutes  Patient Position: Lying left side                                                                                        HEP: As above; handouts given to patient for all exercises. Treatment/Session Assessment:    · Response to Treatment:  No C/O with exercises  ·   · Compliance with Program/Exercises: compliant most of the time. · Recommendations/Intent for next treatment session: \"Next visit will focus on advancements to more challenging activities\".     Total Treatment Duration:60 minutes      PT Patient Time In/Time Out  Time In: 3230  Time Out: Heath Gallegos 14, PTA

## 2018-11-21 ENCOUNTER — APPOINTMENT (OUTPATIENT)
Dept: PHYSICAL THERAPY | Age: 63
End: 2018-11-21
Payer: COMMERCIAL

## 2018-11-27 ENCOUNTER — HOSPITAL ENCOUNTER (OUTPATIENT)
Dept: PHYSICAL THERAPY | Age: 63
Discharge: HOME | End: 2018-11-27
Payer: COMMERCIAL

## 2018-11-27 PROCEDURE — 97110 THERAPEUTIC EXERCISES: CPT

## 2018-11-27 NOTE — PROGRESS NOTES
Aminah Bernabe  : 1955  Primary: [facility]*  Secondary:  [address] at [place] [address], [place], [address]  [phone]   [phone]       OUTPATIENT PHYSICAL THERAPY:Daily Note 2018    ICD-10: Treatment Diagnosis: M25.551 pain in joint/R hip and R26.89 other abnormalities of gait and mobility and Z96.649 presence of unspecified artificial hip joint   Precautions: excessive hip adduction  Allergies: Other plant, animal, environmental  Fall Risk Score: 4 (? 5 = High Risk)  MD Orders: evaluate and treat  MEDICAL/REFERRING DIAGNOSIS:  Presence of unspecified artificial hip joint [Z96.649]   DATE OF ONSET: 10-17-18  REFERRING PHYSICIAN: Dagmar Christiansen MD  RETURN PHYSICIAN APPOINTMENT: 18     INITIAL ASSESSMENT:  Mr. Tono Lynch is a 70 y.o. male presenting to physical therapy with complaints of R hip pain and soreness with abnormality of gait since receiving a R MAYDA on 10-17-18-pain in R hip is 3/10 with no radicular symptoms /no numbness into lower R leg-noted tightness in R gluteus medius and R IT band -incision looks good and is mildly warm but not hot-patient ambulates into dept with a standard cane and minimal+antalgic gait-hip range of motion is wfl with mild limitations with active hip abduction/mild pain inhibition-mild decreased strength in R LE-very good candidate for skilled physical therapy to include manual therapy, therapeutic exercises and cold pack and gait training -motivated patient who should progress with therapy       PROBLEM LIST (Impacting functional limitations):  1. Decreased Strength  2. Decreased ADL/Functional Activities  3. Decreased Transfer Abilities  4. Decreased Ambulation Ability/Technique  5. Decreased Balance  6. Increased Pain  7. Decreased Activity Tolerance  8. Decreased Pacing Skills  9. Decreased Flexibility/Joint Mobility INTERVENTIONS PLANNED:  1. Cold  2. Electrical Stimulation  3.  Gait Training  4. Heat  5. Home Exercise Program (HEP)  6. Manual Therapy  7. Range of Motion (ROM)  8. Therapeutic Exercise/Strengthening   TREATMENT PLAN:  Effective Dates: 11/5/2018 TO 12/5/2018 (30 days). Frequency/Duration: 1 time a week for 30 Days  GOALS: (Goals have been discussed and agreed upon with patient.)  Short-Term Functional Goals: Time Frame: 11-19-18  1. R hip pain is less than 3/10 to progress to DC goal   2. Hip range of motion is wfl with mild pain inhibition  3. R LE strength is at least 4-/5 to allow increased ADLs   Instruction in exercise program to allow increased ADLs. Ambulate with cane with minimal antalgic gait   Discharge Goals: Time Frame: 12-5-18  1. R hip pain is minimal at 1/10 to allow most home ADLs including light lifting   2. Hip range of motion is wfl to allow full personal care ability including putting on shoes and socks and getting in and out of car with driving   3. Independent ambulation with minimal to no antalgic gait to allow walking community distances   4. Able to sleep up to 6-7 hours including sleeping on R side without wakened by hip pain   5. Able to walk/stand for greater than 1 hour   6. LEFS score is improved from 62/80 to 74/80  7. Rehabilitation Potential For Stated Goals: Good  Regarding [facility], I certify that the treatment plan above will be carried out by a therapist or under their direction. Thank you for this referral,  Madeleine Montenegro, PTA                   Date                    The information in this section was collected on 11-5-18 (except where otherwise noted). HISTORY:   History of Present Injury/Illness (Reason for Referral):  R MAYDA on 10-17-18 -R hip soreness with abnormality of gait   Past Medical History/Comorbidities:   Mr. Raysa Eduardo  has a past medical history of Arthritis, GERD (gastroesophageal reflux disease), HTN (hypertension), Hyperlipidemia, PAC (premature atrial contraction), and Palpitations.   Mr. Tabares Alesha has a past surgical history that includes RIGHT HIP ARTHROPLASTY TOTAL / [address] (Right, 10/17/2018). Social History/Living Environment:      Prior Level of Function/Work/Activity:  Independent with home ADLs and walking   Current Medications:       Current Outpatient Medications:     calcium/vitamin D2/zinc/chrom (CALCIUM,VIT D,CHROMIUM,ZINC PO), 2 Tabs by Mouth/Throat route daily. , Disp: , Rfl:     acetaminophen (TYLENOL) 500 mg tablet, Take 2 Tabs by mouth every six (6) hours. , Disp: 120 Tab, Rfl: 0    aspirin delayed-release 325 mg tablet, Take 1 Tab by mouth every twelve (12) hours every twelve (12) hours. , Disp: 60 Tab, Rfl: 0    cyclobenzaprine (FLEXERIL) 10 mg tablet, Take 0.5 Tabs by mouth three (3) times daily. , Disp: 30 Tab, Rfl: 0    gabapentin (NEURONTIN) 100 mg capsule, Take 1 Cap by mouth two (2) times a day., Disp: 30 Cap, Rfl: 0    HYDROmorphone (DILAUDID) 2 mg tablet, Take 1 Tab by mouth every four (4) hours as needed. Max Daily Amount: 12 mg., Disp: 42 Tab, Rfl: 0    ondansetron (ZOFRAN ODT) 8 mg disintegrating tablet, Take 1 Tab by mouth every eight (8) hours as needed for Nausea., Disp: 30 Tab, Rfl: 0    senna-docusate (PERICOLACE) 8.6-50 mg per tablet, Take 2 Tabs by mouth daily. , Disp: 120 Tab, Rfl: 0    zolpidem (AMBIEN) 5 mg tablet, Take 1 Tab by mouth nightly as needed for Sleep. Max Daily Amount: 5 mg., Disp: 30 Tab, Rfl: 0    MULTIVITAMIN PO, Take  by mouth daily. , Disp: , Rfl:     beta-carotene,A,-vits C and E (ANTIOXIDANT PO), Take  by mouth daily. , Disp: , Rfl:     OTHER,NON-FORMULARY,, Calcium, magnesium, zinc and vitamin D3 OTC supplement. 1 tab by mouth daily. , Disp: , Rfl:     amLODIPine-benazepril (LOTREL) 2.5-10 mg per capsule, Take 1 Cap by mouth daily. , Disp: 80 Cap, Rfl: 3   Date Last Reviewed:  11/27/2018   Number of Personal Factors/Comorbidities that affect the Plan of Care:  (patient has a history of GERD, HTN, and arthritis 1-2: MODERATE COMPLEXITY   EXAMINATION:   Observation/Orthostatic Postural Assessment:          Patient ambulates with cane and minimal + antalgic gait with mild rounded shoulders and forward head on neck posture   Palpation:          Tight R gluteus medius /at incision site and at R IT band   ROM:          R ankle and knee range of motion is wfl     R hip range of motion is wfl in flexion and extension actively but 66% active hip abduction with mild pain inhibition  Strength: Ankles are 4/5     R quads and hamstrings are 3+/5    L quads and hamstrings are 4/5     L hip flexor/extension/abduction is 4 to 4+/5     R hip flexors/extensors are 3+ to 4-/5  With R hip abductors at 3+/5     Special Tests:         Negative homans sign  Balance:        Good actively with cane -good minus without cane   Mental Status:        Alert and oriented ( understands some English and follows hand gestures well )  Sensation:     Intact to light touch    Body Structures Involved:  1. Bones  2. Joints  3. Muscles Body Functions Affected:  1. Voice and Speech  2. Neuromusculoskeletal  3. Movement Related Activities and Participation Affected:  1. Learning and Applying Knowledge  2. General Tasks and Demands  3. Mobility  4. Self Care  5. Domestic Life   Number of elements (examined above) that affect the Plan of Care: 3: MODERATE COMPLEXITY   CLINICAL PRESENTATION:   Presentation: Evolving clinical presentation with changing clinical characteristics: MODERATE COMPLEXITY   CLINICAL DECISION MAKING:   Outcome Measure: Tool Used: Lower Extremity Functional Scale (LEFS)  Score:  Initial: 62/80 Most Recent: X/80 (Date: -- )   Interpretation of Score: 20 questions each scored on a 5 point scale with 0 representing \"extreme difficulty or unable to perform\" and 4 representing \"no difficulty\". The lower the score, the greater the functional disability. 80/80 represents no disability. Minimal detectable change is 9 points.   Score 80 79-63 62-48 47-32 31-16 15-1 0   Modifier CH CI CJ CK CL CM CN         Medical Necessity:   · Patient is expected to demonstrate progress in strength, range of motion, balance, coordination and functional technique to increase independence with ADLs and improve safety during walking and transfers. · Skilled intervention continues to be required due to R hip pain and stiffness following R MAYDA is affecting gait and function. Reason for Services/Other Comments:  · Patient continues to require modification of therapeutic interventions to increase complexity of exercises. Use of outcome tool(s) and clinical judgement create a POC that gives a:  (outcome measure score illustrates up to 39% impairment ) Questionable prediction of patient's progress: MODERATE COMPLEXITY            TREATMENT:   (In addition to Assessment/Re-Assessment sessions the following treatments were rendered)  Pre-treatment Symptoms/Complaints:  Patient reports minimal to no pain. and performing exercises 2-3 times per day    Pain: Initial:   Pain Intensity 1: 1  Pain Location 1: Hip  Pain Orientation 1: Right, Lateral  Post Session: 1/10      Therapeutic Exercise: (45 Minutes):        Exercises per grid below to improve mobility, strength, balance and coordination. Required minimal verbal cues to promote proper body alignment and promote proper body posture. Progressed resistance, range, repetitions and complexity of movement as indicated.    Date:  11-27-18 Date:  11-14-18 Date:  11-20-18   Activity/Exercise Parameters Parameters Parameters   Gastrocnemius stretch  2 x 30 seconds    Ankle pumps  X 20    Quad sets  X 10 with 3 second hold    Gluteal sets  X 10 with 3 second hold    Heel slides  X 20 2 x 10   Hip abduction Black 2 x 15 supine  Side lying 2 x 10   X 20 with black band Black 3 x 10   Hip adduction (mild with theraball ) 2 x 15 2 x 10 with theraball Ball 3 x 10   Marching in place  X 20 with 3 lbs    Sit to stand 2 x 10 X 12 2 x 10   Long arc quads  X 20 with 3 lbs 4 lbs 2 x 15   Standing hamstring curls 3 lbs 2 x 10 X 20 with 3 lbs 4 lbs 3 x 10   Weight shifts  3 x 30 seconds    Gait training  X 5 minutes in hallway/mirror    Nu step 6 minutes level 5.5 X 10 minutes at level 2  unavailable   Seated hamstring curls with eccentric knee extension Black 3 x 10 X 20 with green band Black 3 x 10   Partial squats  reviewed 2 x 10   Standing hip abduction 3 lbs 3 x 10 X 20 with 3 lbs 4 lbs 2 x 10   Standing hip extension  X 20 with 3 lbs 4 lbs 2 x 10   Standing hip flexion 3 lbs 3 x 10 X 20 with 3 lbs 4 lbs 2 x 10   bridging 2 x 10  2 x 5   Side step Green band x 50 feet     clamshell 3 x 10 right         Reviewed hip precautions and answered several questions about activities\      Manual Therapy (           Therapeutic Modalities: for pain and edema    Patient to ice at home      Right Hip Heat  Type: Moist pack  Duration : 10 minutes  Patient Position: Lying left side                                                                                        HEP: As above; handouts given to patient for all exercises. Treatment/Session Assessment:    · Response to Treatment:  No C/O with exercises. Very good progress. ·   · Compliance with Program/Exercises: compliant most of the time. · Recommendations/Intent for next treatment session: \"Next visit will focus on advancements to more challenging activities\".     Total Treatment Duration:55 minutes      PT Patient Time In/Time Out  Time In: 0900  Time Out: Heath Gallegos 14, PTA

## 2018-11-27 NOTE — PROGRESS NOTES
I am accessing Mr. Sawyer Gamboa chart as a part of our department's internal chart auditing process. I certify that Mr. Laura Smith is, or was, a patient in our department.   Thank you,  Frances Adrian, PTA  11/27/2018

## 2018-12-04 ENCOUNTER — HOSPITAL ENCOUNTER (OUTPATIENT)
Dept: PHYSICAL THERAPY | Age: 63
Discharge: HOME | End: 2018-12-04
Payer: COMMERCIAL

## 2018-12-04 PROCEDURE — 97110 THERAPEUTIC EXERCISES: CPT

## 2018-12-04 PROCEDURE — 97140 MANUAL THERAPY 1/> REGIONS: CPT

## 2018-12-04 NOTE — PROGRESS NOTES
Malina Larose  : 1955  Primary: [facility] [employer]*  Secondary:  [address] at [facility]  [address], [place], [address] [place] [address]  [phone]   [phone]       OUTPATIENT PHYSICAL THERAPY:Daily Note, Progress Report and Recertification HOLD THERAPY UNTIL 2018    ICD-10: Treatment Diagnosis: M25.551 pain in joint/R hip and R26.89 other abnormalities of gait and mobility and Z96.649 presence of unspecified artificial hip joint   Precautions: excessive hip adduction  Allergies: Other plant, animal, environmental  Fall Risk Score: 4 (? 5 = High Risk)  MD Orders: evaluate and treat  MEDICAL/REFERRING DIAGNOSIS:  Presence of unspecified artificial hip joint [Z96.649]   DATE OF ONSET: 10-17-18  REFERRING PHYSICIAN: Kristin Castle MD  RETURN PHYSICIAN APPOINTMENT: 18     INITIAL ASSESSMENT:  Mr. Barrett Galan is a 70 y.o. male presenting to physical therapy with complaints of R hip pain and soreness with abnormality of gait since receiving a R MAYDA on 10-17-18-pain in R hip is 3/10 with no radicular symptoms /no numbness into lower R leg-noted tightness in R gluteus medius and R IT band -incision looks good and is mildly warm but not hot-patient ambulates into dept with a standard cane and minimal+antalgic gait-hip range of motion is wfl with mild limitations with active hip abduction/mild pain inhibition-mild decreased strength in R LE-very good candidate for skilled physical therapy to include manual therapy, therapeutic exercises and cold pack and gait training -motivated patient who should progress with therapy       PROBLEM LIST (Impacting functional limitations):  1. Decreased Strength  2. Decreased ADL/Functional Activities  3. Decreased Transfer Abilities  4. Decreased Ambulation Ability/Technique  5. Decreased Balance  6. Increased Pain  7. Decreased Activity Tolerance  8. Decreased Pacing Skills  9.  Decreased Flexibility/Joint Mobility INTERVENTIONS PLANNED:  1. Cold  2. Electrical Stimulation  3. Gait Training  4. Heat  5. Home Exercise Program (HEP)  6. Manual Therapy  7. Range of Motion (ROM)  8. Therapeutic Exercise/Strengthening   TREATMENT PLAN:  Effective Dates: 11/5/2018 TO 12/5/2018 (30 days). Frequency/Duration: 1 time a week for 30 Days  GOALS: (Goals have been discussed and agreed upon with patient.)  Short-Term Functional Goals: Time Frame: 11-19-18  1. R hip pain is less than 3/10 to progress to DC goal -GOAL MET  2. Hip range of motion is wfl with mild pain inhibition-GOAL MET  3. R LE strength is at least 4-/5 to allow increased ADLs -GOAL MET  Instruction in exercise program to allow increased ADLs. -GOAL MET  Ambulate with cane with minimal antalgic gait   Discharge Goals: Time Frame: 12-5-18  1. R hip pain is minimal at 1/10 to allow most home ADLs including light lifting -GOAL MET  2. Hip range of motion is wfl to allow full personal care ability including putting on shoes and socks and getting in and out of car with driving -GOAL MET-MILD PROBLEM WITH PUTTING ON SOCKS   3. Independent ambulation with minimal to no antalgic gait to allow walking community distances -GOAL MET  4. Able to sleep up to 6-7 hours including sleeping on R side without wakened by hip pain -GOAL MET  5. Able to walk/stand for greater than 1 hour -GOAL MET  6.  LEFS score is improved from 62/80 to 74/80-ONGOING--LATEST SCORE IS 66/80    Patient has been seen for 5 visits of R hip rehab from 11-5-18 to 12-4-18 with very good progress-hip pain is minimal at 1/10 -hip range of motion is wfl and R hip/LE strength is 4/5 compared to 4+/5 in L LE -independent with home exercise program -independent ambulation with minimal to no antalgic gait -improved function and arm swing -decreased R gluteus medius and IT band tightness-very motivated patient who wants to hold therapy until the start of the new year when he will have more rehab appointments available -motivated patient -very pleasant gentleman to work with      7.   Rehabilitation Potential For Stated Goals: Good  Regarding [employer] therapy, I certify that the treatment plan above will be carried out by a therapist or under their direction. Thank you for this referral,  Sandra Goff, PT                   Date                    The information in this section was collected on 11-5-18 (except where otherwise noted). HISTORY:   History of Present Injury/Illness (Reason for Referral):  R MAYDA on 10-17-18 -R hip soreness with abnormality of gait   Past Medical History/Comorbidities:   Mr. Benja Ocampo  has a past medical history of Arthritis, GERD (gastroesophageal reflux disease), HTN (hypertension), Hyperlipidemia, PAC (premature atrial contraction), and Palpitations. Mr. Benja Ocampo  has a past surgical history that includes RIGHT HIP ARTHROPLASTY TOTAL / [address] (Right, 10/17/2018). Social History/Living Environment:      Prior Level of Function/Work/Activity:  Independent with home ADLs and walking   Current Medications:       Current Outpatient Medications:     calcium/vitamin D2/zinc/chrom (CALCIUM,VIT D,CHROMIUM,ZINC PO), 2 Tabs by Mouth/Throat route daily. , Disp: , Rfl:     acetaminophen (TYLENOL) 500 mg tablet, Take 2 Tabs by mouth every six (6) hours. , Disp: 120 Tab, Rfl: 0    aspirin delayed-release 325 mg tablet, Take 1 Tab by mouth every twelve (12) hours every twelve (12) hours. , Disp: 60 Tab, Rfl: 0    cyclobenzaprine (FLEXERIL) 10 mg tablet, Take 0.5 Tabs by mouth three (3) times daily. , Disp: 30 Tab, Rfl: 0    gabapentin (NEURONTIN) 100 mg capsule, Take 1 Cap by mouth two (2) times a day., Disp: 30 Cap, Rfl: 0    HYDROmorphone (DILAUDID) 2 mg tablet, Take 1 Tab by mouth every four (4) hours as needed. Max Daily Amount: 12 mg., Disp: 42 Tab, Rfl: 0    ondansetron (ZOFRAN ODT) 8 mg disintegrating tablet, Take 1 Tab by mouth every eight (8) hours as needed for Nausea. , Disp: 30 Tab, Rfl: 0    senna-docusate (PERICOLACE) 8.6-50 mg per tablet, Take 2 Tabs by mouth daily. , Disp: 120 Tab, Rfl: 0    zolpidem (AMBIEN) 5 mg tablet, Take 1 Tab by mouth nightly as needed for Sleep. Max Daily Amount: 5 mg., Disp: 30 Tab, Rfl: 0    MULTIVITAMIN PO, Take  by mouth daily. , Disp: , Rfl:     beta-carotene,A,-vits C and E (ANTIOXIDANT PO), Take  by mouth daily. , Disp: , Rfl:     OTHER,NON-FORMULARY,, Calcium, magnesium, zinc and vitamin D3 OTC supplement. 1 tab by mouth daily. , Disp: , Rfl:     amLODIPine-benazepril (LOTREL) 2.5-10 mg per capsule, Take 1 Cap by mouth daily. , Disp: 90 Cap, Rfl: 3   Date Last Reviewed:  12/4/2018   Number of Personal Factors/Comorbidities that affect the Plan of Care:  (patient has a history of GERD, HTN, and arthritis 1-2: MODERATE COMPLEXITY   EXAMINATION:   Observation/Orthostatic Postural Assessment:          Patient ambulates independently with minimal to no antalgic gait with less  mild rounded shoulders and less forward head on neck posture   Palpation:        Less tight R gluteus medius /at incision site and at R IT band   ROM:          R ankle and knee range of motion is wfl     R hip range of motion is wfl with minimal to no pain inhibition  Strength: Ankles are 4/5     R quads and hamstrings are 4/5    R hip flexors/abductors/extensors are 4-/5     L quads and hamstrings are 4/5     L hip flexor/extension/abduction is 4 to 4+/5         Special Tests:         Negative homans sign  Balance:        Good actively with cane -good minus without cane   Mental Status:        Alert and oriented ( understands some English and follows hand gestures well )  Sensation:     Intact to light touch    Body Structures Involved:  1. Bones  2. Joints  3. Muscles Body Functions Affected:  1. Voice and Speech  2. Neuromusculoskeletal  3. Movement Related Activities and Participation Affected:  1. Learning and Applying Knowledge  2.  General Tasks and Demands  3. Mobility  4. Self Care  5. Domestic Life   Number of elements (examined above) that affect the Plan of Care: 3: MODERATE COMPLEXITY   CLINICAL PRESENTATION:   Presentation: Evolving clinical presentation with changing clinical characteristics: MODERATE COMPLEXITY   CLINICAL DECISION MAKING:   Outcome Measure: Tool Used: Lower Extremity Functional Scale (LEFS)  Score:  Initial: 62/80 Most Recent: 66/80 (Date: 12-4-18-- )   Interpretation of Score: 20 questions each scored on a 5 point scale with 0 representing \"extreme difficulty or unable to perform\" and 4 representing \"no difficulty\". The lower the score, the greater the functional disability. 80/80 represents no disability. Minimal detectable change is 9 points. Score 80 79-63 62-48 47-32 31-16 15-1 0   Modifier CH CI CJ CK CL CM CN         Medical Necessity:   · Patient is expected to demonstrate progress in strength, range of motion, balance, coordination and functional technique to increase independence with ADLs and improve safety during walking and transfers. · Skilled intervention continues to be required due to R hip pain and stiffness following R MAYDA is affecting gait and function. Reason for Services/Other Comments:  · Patient continues to require modification of therapeutic interventions to increase complexity of exercises. Use of outcome tool(s) and clinical judgement create a POC that gives a:  (outcome measure score illustrates up to 39% impairment ) Questionable prediction of patient's progress: MODERATE COMPLEXITY            TREATMENT:   (In addition to Assessment/Re-Assessment sessions the following treatments were rendered)  Pre-treatment Symptoms/Complaints:  Patient reports minimal to no pain. and performing exercises 2-3 times per day and wants to hold therapy until new year when he will have rehab appointments available but states he will exercises regularly over the holidays and hopes he will not need therapy in Jan/2019    Pain: Initial:   Pain Intensity 1: 1  Pain Location 1: Hip  Pain Orientation 1: Lateral, Right  Pain Intervention(s) 1: Exercise, Cold pack  Post Session: 1/10 -hold therapy -most goals met     Therapeutic Exercise: ( ): x 45 minutes       Exercises per grid below to improve mobility, strength, balance and coordination. Required minimal verbal cues to promote proper body alignment and promote proper body posture. Progressed resistance, range, repetitions and complexity of movement as indicated.    Date:  11-27-18 Date:  12-4-18 Date:  11-20-18   Activity/Exercise Parameters Parameters Parameters   Gastrocnemius stretch  2 x 30 seconds on incline     Ankle pumps  X 20    Quad sets      Gluteal sets      Heel slides   2 x 10   Hip abduction Black 2 x 15 supine  Side lying 2 x 10    Black 3 x 10   Hip adduction (mild with theraball ) 2 x 15 2 x 10 with theraball Ball 3 x 10   Marching in place  X 20 with 3 lbs in sitting  and x 20 with 3 lbs in standing     Sit to stand 2 x 10 2 x 10 with no arm assist 2 x 10   Long arc quads  X 20 with 3 lbs 4 lbs 2 x 15   Standing hamstring curls 3 lbs 2 x 10  4 lbs 3 x 10   Weight shifts      Gait training  X 6 minutes in hallway/mirror    Nu step 6 minutes level 5.5 X 10 minutes at level 4  unavailable   Seated hamstring curls with eccentric knee extension Black 3 x 10 X 20 with green band Black 3 x 10   Partial squats  X 10 on BOSU 2 x 10   Standing hip abduction 3 lbs 3 x 10 X 20 with 3 lbs 4 lbs 2 x 10   Standing hip extension  X 20 with 3 lbs 4 lbs 2 x 10   Standing hip flexion 3 lbs 3 x 10 X 20 with 3 lbs 4 lbs 2 x 10   bridging 2 x 10  2 x 5   Side step Green band x 50 feet Up and down with R LE x 10 and up and down steps x 10 leading with R LE       clamshell 3 x 10 right Clam walking x 6 minutes with green band            Manual Therapy (       Soft tissue mobilization to R gluteus medius and IT band x 10 minutes while in L sidelying position-effleurage and petrisage for tigtness        Therapeutic Modalities: for pain and edema    Patient to ice at home                     Right Hip Cold  Type: Cold/ice packs  Duration : 8 minutes  Patient Position: Lying left side                                                                         HEP: As above; handouts given to patient for all exercises. Treatment/Session Assessment:    · Response to Treatment:  No C/O with exercises. Very good progress. -most goals met-hold therapy until Jan/2019 at patient request  ·   · Compliance with Program/Exercises: compliant most of the time. · Recommendations/Intent for next treatment session: \"Next visit will focus on advancements to more challenging activities\". hold therapy at this time but very good progress    Total Treatment Duration:63 minutes      PT Patient Time In/Time Out  Time In: 0900  Time Out: 1008    Quinn Carranza, PT

## 2019-06-12 ENCOUNTER — HOSPITAL ENCOUNTER (OUTPATIENT)
Dept: PHYSICAL THERAPY | Age: 64
Discharge: HOME | End: 2019-06-12
Payer: COMMERCIAL

## 2019-06-12 PROCEDURE — 97035 APP MDLTY 1+ULTRASOUND EA 15: CPT

## 2019-06-12 PROCEDURE — 97162 PT EVAL MOD COMPLEX 30 MIN: CPT

## 2019-06-12 PROCEDURE — 97140 MANUAL THERAPY 1/> REGIONS: CPT

## 2019-06-12 NOTE — PROGRESS NOTES
Michael Bautista  : 1955  Primary: [facility] [employer]*  Secondary:  [facility] [place] [address], [place], [address]  [phone]   [phone]      OUTPATIENT PHYSICAL THERAPY: Daily Treatment Note 2019    ICD-10: Treatment Diagnosis:M25.512 pain in joint/L shoulder                 Treatment Diagnosis 2: M25.612 stiffness in joint/L shoulder                 Treatment Diagnosis 3:   Precautions: overhead lifting  Allergies: Other plant, animal, environmental  TREATMENT PLAN:  Effective Dates: 2019 TO 19 (45 days). 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 Required minimal verbal cues to promote proper body alignment and promote proper body posture. Progressed resistance, range, repetitions and complexity of movement as indicated.      Date:  2019 Date:   Date:     Activity/Exercise Parameters Parameters Parameters   Shoulder flexion      Shoulder extension      Shoulder internal rotation      Shoulder external rotation      Wall pushups      Reverse wall pushups                MANUAL THERAPY: (15 minutes): Soft tissue mobilization was utilized and necessary because of the patient's restricted motion of soft tissue   Soft tissue mobilization x 15 minutes to L deltoid and teres and upper trapezius while seated -effleurage and petrisage for tightness    MODALITIES: (18 minutes): *  Ultrasound was used today secondary to the patient having tightened structures limiting joint motion that require an increase in extensibility. Ultrasound was used today to reduce pain, reduce spasm, reduce joint stiffness and increase muscle flexibility. *  Cold Pack Therapy in order to provide analgesia and reduce inflammation and edema. Ultrasound x 10 minutes to L deltoid while seated at 1.5 w/cm     Cold pack x 8 minutes to L shoulder after therapy     HEP: As above; handouts given to patient for all exercises. Treatment/Session Summary:    · Response to Treatment:  relief with manual therapy and cold pack . · Communication/Consultation:  HEP  · Equipment provided today:  red theraband for home usage  · Recommendations/Intent for next treatment session: Next visit will focus on L shoulder conditioning/stability followed with cold pack .     Total Treatment Billable Duration:  Manual therapy x 15 minutes /ultrasound x 10 minutes/evaluation x 20 minutes =45 minutes  PT Patient Time In/Time Out  Time In: 1440  Time Out: 66 Caridad Hager, PT

## 2019-06-12 NOTE — THERAPY EVALUATION
David Vences  : 1955  Primary: [facility]*  Secondary:  [address] at [place] [address], [place], [address]  [phone]   [phone]       OUTPATIENT PHYSICAL THERAPY:Initial Assessment 2019    ICD-10: Treatment Diagnosis: M25.512 pain in joint/L shoulder                 Treatment Diagnosis 2: M25.612 stiffness in joint/L shoulder                 Treatment Diagnosis 3:   Precautions: over head work/lifting  Allergies: Other plant, animal, environmental   TREATMENT PLAN:  Effective Dates: 2019 TO 19 (45 days). Frequency/Duration: 1-2 time a week for 45 Day(s) MEDICAL/REFERRING DIAGNOSIS:  Left shoulder pain [M25.512]   DATE OF ONSET:on and off shoulder pain x 3 years   REFERRING PHYSICIAN: Karen Alvarado MD MD Orders: Evaluate and Treat   Return MD Appointment: unsure     INITIAL ASSESSMENT:  Mr. David Espinoza is a 70 y.o. male presenting to physical therapy with complaints of on and off L shoulder pain x 3 years -pain level is 3/10 at rest and increased to 5-6/10 with overhead work/lifting/painting -shoulder range is actively wfl -strength is wfl below shoulder level -no radicular symptoms-no headaches -normal arm swing with gait-mild tender at L teres and upper trapezius -sore to palpation at anterior deltoid  -able to take off shirt with no pain -cervical range is wfl - very good candidate for skilled physical therapy to include manual therapy, pain modalities and therapeutic exercises followed with cold pack  (needs his daughter in law to interpret -[place] speaking )   PROBLEM LIST (Impacting functional limitations):  1. Decreased ADL/Functional Activities  2. Decreased Flexibility/Joint Mobility INTERVENTIONS PLANNED: (Treatment may consist of any combination of the following)  1. Cold  2. Electrical Stimulation  3. Heat  4. Home Exercise Program (HEP)  5. Manual Therapy  6. Range of Motion (ROM)  7.  Therapeutic Exercise/Strengthening  8. Ultrasound (US)     GOALS: (Goals have been discussed and agreed upon with patient.)  Short-Term Functional Goals: Time Frame: 6/12/2019 to 6/26/19  1. Patient demonstrates independence with home exercise program without verbal cueing provided by therapist.  2. Shoulder pain is less than 3/10 to progress to DC goal   3. Decreased anterior deltoid tenderness  4. Able to perform mild overhead lifting  Instruction in exercise program to allow increased ADLs. Discharge Goals: Time Frame: 6/12/2019 to 7/26/19  1. Shoulder pain is minimal to allow full home ADLs including playing/lifting grandchildren  2. Shoulder range of motion is full and pain free to allow painting   3. Able to sleep on L side with no pain   4. 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 DASH score is improved from 31/55 to 16/55    Outcome Measure: Tool Used: Disabilities of the Arm, Shoulder and Hand (DASH) Questionnaire - Quick Version  Score:  Initial: 31/55  Most Recent: X/55 (Date: -- )   Interpretation of Score: The DASH is designed to measure the activities of daily living in person's with upper extremity dysfunction or pain. Each section is scored on a 1-5 scale, 5 representing the greatest disability. The scores of each section are added together for a total score of 55. Medical Necessity:   · Patient is expected to demonstrate progress in strength, range of motion, balance, coordination and functional technique to increase independence with ADLs and improve safety during dressing and driving . · Skilled intervention continues to be required due to L shoulder pain is affecting function and sleep. Reason for Services/Other Comments:  · Patient continues to require modification of therapeutic interventions to increase complexity of exercises.   Total Evaluation Duration: 20 minutes    Rehabilitation Potential For Stated Goals: Good  Regarding [facility] therapy, I certify that the treatment plan above will be carried out by a therapist or under their direction. Thank you for this referral,  Matt Miranda PT     Referring Physician Signature: Christy Sainz MD              Date                     PAIN/SUBJECTIVE:    Initial: Pain Intensity 1: 3  Pain Location 1: Shoulder  Pain Orientation 1: Left, Anterior  Pain Intervention(s) 1: Cold pack, Exercise, Heat applied  Post Session:  {2/10m -relief with manual therapy and cold pack     HISTORY:    History of Injury/Illness (Reason for Referral): Insidious L shoulder pain x 3 years   Past Medical History/Comorbidities:   Mr. Keira Herman  has a past medical history of Arthritis, GERD (gastroesophageal reflux disease), HTN (hypertension), Hyperlipidemia, PAC (premature atrial contraction), and Palpitations. He also has no past medical history of Aneurysm ([address] Asthma, Autoimmune disease ([address] CAD (coronary artery disease), Cancer ([address] Chronic kidney disease, Chronic obstructive pulmonary disease ([address] Chronic pain, Coagulation disorder ([address] Diabetes ([address] Endocarditis, Heart failure ([address] Ill-defined condition, Liver disease, Nicotine vapor product user, Non-nicotine vapor product user, Psychiatric disorder, PUD (peptic ulcer disease), Rheumatic fever, Seizures ([address] Sleep apnea, Stroke ([address] Thromboembolus ([address] or Thyroid disease. Mr. Keira Herman  has no past surgical history on file.   Social History/Living Environment:   Home Environment: Private residence  Wheelchair Ramp: No  One/Two Story Residence: One story  Living Alone: No  Support Systems: Child(sabrina), Family member(s), Friends \ neighbors  Patient Expects to be Discharged to[de-identified] Private residence  Current DME Used/Available at Home: None  Tub or Shower Type: Tub/Shower combination  Prior Level of Function/Work/Activity:  Independent with home ADLs and job duties     Ambulatory/Rehab Services H2 Model Falls Risk Assessment    Risk Factors:       (1) Gender [Male]       (2)  Any administered antiepileptics/anticonvulsants Ability to Rise from Chair:       (1)  Pushes up, successful in one attempt    Falls Prevention Plan:       No modifications necessary    Total: (5 or greater = High Risk): 4    ©2010 [facility] [place] . [employer] States Patent #[id number]. Federal Law prohibits the replication, distribution or use without written permission from [facility] of [employer]    Current Medications:        Current Outpatient Medications:     calcium/vitamin D2/zinc/chrom (CALCIUM,VIT D,CHROMIUM,ZINC PO), 2 Tabs by Mouth/Throat route daily. , Disp: , Rfl:     acetaminophen (TYLENOL) 500 mg tablet, Take 2 Tabs by mouth every six (6) hours. , Disp: 120 Tab, Rfl: 0    aspirin delayed-release 325 mg tablet, Take 1 Tab by mouth every twelve (12) hours every twelve (12) hours. , Disp: 60 Tab, Rfl: 0    cyclobenzaprine (FLEXERIL) 10 mg tablet, Take 0.5 Tabs by mouth three (3) times daily. , Disp: 30 Tab, Rfl: 0    gabapentin (NEURONTIN) 100 mg capsule, Take 1 Cap by mouth two (2) times a day., Disp: 30 Cap, Rfl: 0    HYDROmorphone (DILAUDID) 2 mg tablet, Take 1 Tab by mouth every four (4) hours as needed. Max Daily Amount: 12 mg., Disp: 42 Tab, Rfl: 0    ondansetron (ZOFRAN ODT) 8 mg disintegrating tablet, Take 1 Tab by mouth every eight (8) hours as needed for Nausea., Disp: 30 Tab, Rfl: 0    senna-docusate (PERICOLACE) 8.6-50 mg per tablet, Take 2 Tabs by mouth daily. , Disp: 120 Tab, Rfl: 0    zolpidem (AMBIEN) 5 mg tablet, Take 1 Tab by mouth nightly as needed for Sleep. Max Daily Amount: 5 mg., Disp: 30 Tab, Rfl: 0    MULTIVITAMIN PO, Take  by mouth daily. , Disp: , Rfl:     beta-carotene,A,-vits C and E (ANTIOXIDANT PO), Take  by mouth daily. , Disp: , Rfl:     OTHER,NON-FORMULARY,, Calcium, magnesium, zinc and vitamin D3 OTC supplement. 1 tab by mouth daily. , Disp: , Rfl:     amLODIPine-benazepril (LOTREL) 2.5-10 mg per capsule, Take 1 Cap by mouth daily. , Disp: 90 Cap, Rfl: 3    Date Last Reviewed:  6/12/2019    Number of Personal Factors/Comorbidities that affect the Plan of Care-patient has a history of HTN, GERD, arthritis, and hyperliidemia: 1-2: MODERATE COMPLEXITY    EXAMINATION:    Observation/Orthostatic Postural Assessment:          Ambulates independently with mild rounded shoulders and forward head on neck posture   Palpation:         Tight L upper trapezius and mild tender at teres and anterior deltoid  ROM:        Shoulder range of motion is wfl actively bilaterally  Strength:         4/5 at below shoulder level      is 69 on L hand and 60 on right     Special Tests:         Negative cervical distraction/supraspinatus isolation test/drop arm test and negative lift off test     Positive obriens sign for L labrum issues    Neurological Screen:  No radiating pain or numbness or tingling into lower arm   Mental Status:          Alert and oriented   Sensation:      Intact to light touch     Body Structures Involved:  1. Bones  2. Joints  3. Muscles Body Functions Affected:  1. Sensory/Pain  2. Neuromusculoskeletal  3. Movement Related Activities and Participation Affected:  1. Learning and Applying Knowledge  2. General Tasks and Demands  3. Mobility  4. Self Care  5. Domestic Life    Number of elements (examined above) that affect the Plan of Care: 3: MODERATE COMPLEXITY    CLINICAL PRESENTATION:    Presentation: Evolving clinical presentation with changing clinical characteristics: MODERATE COMPLEXITY    CLINICAL DECISION MAKING:    Use of outcome tool(s) and clinical judgement create a POC that gives a-impairment of less than 60%:  Questionable prediction of patient's progress: MODERATE COMPLEXITY

## 2019-06-18 ENCOUNTER — HOSPITAL ENCOUNTER (OUTPATIENT)
Dept: PHYSICAL THERAPY | Age: 64
Discharge: HOME | End: 2019-06-18
Payer: COMMERCIAL

## 2019-06-18 PROCEDURE — 97110 THERAPEUTIC EXERCISES: CPT

## 2019-06-18 PROCEDURE — 97140 MANUAL THERAPY 1/> REGIONS: CPT

## 2019-06-18 PROCEDURE — 97035 APP MDLTY 1+ULTRASOUND EA 15: CPT

## 2019-06-18 NOTE — PROGRESS NOTES
Stevan Null  : 1955  Primary: [facility]*  Secondary:  [facility] [place] [address], [place], [address]  [phone]   [phone]      OUTPATIENT PHYSICAL THERAPY: Daily Treatment Note 2019    ICD-10: Treatment Diagnosis:M25.512 pain in joint/L shoulder                 Treatment Diagnosis 2: M25.612 stiffness in joint/L shoulder                 Treatment Diagnosis 3:   Precautions: overhead lifting  Allergies: Other plant, animal, environmental  TREATMENT PLAN:  Effective Dates: 2019 TO 19 (45 days). Frequency/Duration: 1-2  time a week for 45 Day(s) MEDICAL/REFERRING DIAGNOSIS:  Left shoulder pain [M25.512]  DATE OF ONSET: on and off x 3 years   REFERRING PHYSICIAN: Keshia Baldwin MD MD Orders: Evaluate and Treat   Return MD Appointment: unsure     Pre-treatment Symptoms/Complaints: Pt. Reported increased pain with shoulder flexion greater than 90 degrees and when he tries to go to sleep. Pain: Initial: Pain Intensity 1: 4  Pain Location 1: Shoulder  Pain Orientation 1: Anterior, Left  Pain Intervention(s) 1: Cold pack, Exercise  Post Session:  0/10 no pain   Medications Last Reviewed:  2019  Updated Objective Findings:  Pt. had increased pain with flexion greater than 90 degrees. TREATMENT:   THERAPEUTIC EXERCISE: (30 minutes):    Exercises per grid below to improve mobility, strength, balance and coordination. Required minimal verbal cues to promote proper body alignment and promote proper body posture. Progressed resistance, range, repetitions and complexity of movement as indicated.      Date:  2019 Date:  19 Date:     Activity/Exercise Parameters Parameters Parameters   Shoulder flexion  Green band x 10 reps 5 sec hold    Shoulder extension  X 10 reps green band 5 sec hold     Shoulder internal rotation  X 10 reps 5 sec hold green band    Shoulder external rotation  X 10 reps 5 sec hold green band    Wall pushups  X 10 reps 5 sec hold     Reverse wall pushups  X 10 reps 5 sec hold     Wall pulley  Flexion, scaption, & abduction x 5 mins     Shoulder rows   Green band x 10 reps 5 sec hold each     Low shoulder rows   Green band x 10 reps 5 sec hold each        MANUAL THERAPY: (15 minutes): Soft tissue mobilization was utilized and necessary because of the patient's restricted motion of soft tissue   Soft tissue mobilization x 15 minutes to left shoulder, cervical paraspinals, and upper traps to decrease pain and muscular tightness. MODALITIES: (20 minutes): *  Ultrasound was used today secondary to the patient having tightened structures limiting joint motion that require an increase in extensibility. Ultrasound was used today to reduce pain, reduce spasm, reduce joint stiffness and increase muscle flexibility. *  Cold Pack Therapy in order to provide analgesia and reduce inflammation and edema. Ultrasound x 10 minutes to L deltoid while seated at 1.5 w/cm     Cold pack x 8 minutes to L shoulder after therapy     HEP: As above; handouts given to patient for all exercises. Treatment/Session Summary:    · Response to Treatment:  Pt. and  compliant and stated they had a clear understanding of exercises. Extra time spent due to language barrier and translation. .  · Communication/Consultation:  HEP  · Equipment provided today:  red theraband for home usage  · Recommendations/Intent for next treatment session: Next visit will focus on L shoulder conditioning/stability followed with cold pack .     Total Treatment Billable Duration: 55 mins   PT Patient Time In/Time Out  Time In: 1100  Time Out: [address], [place]

## 2019-06-25 ENCOUNTER — HOSPITAL ENCOUNTER (OUTPATIENT)
Dept: PHYSICAL THERAPY | Age: 64
Discharge: HOME | End: 2019-06-25
Payer: COMMERCIAL

## 2019-06-25 PROCEDURE — 97110 THERAPEUTIC EXERCISES: CPT

## 2019-06-25 PROCEDURE — 97035 APP MDLTY 1+ULTRASOUND EA 15: CPT

## 2019-06-25 PROCEDURE — 97140 MANUAL THERAPY 1/> REGIONS: CPT

## 2019-06-25 NOTE — PROGRESS NOTES
Lauren Lutz  : 1955  Primary: [facility]*  Secondary:  [facility] [place] [address], [place], [address]  [phone]   [phone]      OUTPATIENT PHYSICAL THERAPY: Daily Treatment Note 2019    ICD-10: Treatment Diagnosis:M25.512 pain in joint/L shoulder                 Treatment Diagnosis 2: M25.612 stiffness in joint/L shoulder                 Treatment Diagnosis 3:   Precautions: overhead lifting  Allergies: Other plant, animal, environmental  TREATMENT PLAN:  Effective Dates: 2019 TO 19 (45 days). Frequency/Duration: 1-2  time a week for 45 Day(s) MEDICAL/REFERRING DIAGNOSIS:  Left shoulder pain [M25.512]  DATE OF ONSET: on and off x 3 years   REFERRING PHYSICIAN: Cullen Levi MD MD Orders: Evaluate and Treat   Return MD Appointment: unsure     Pre-treatment Symptoms/Complaints: Pt. Reported his pain level has increased to 4/10 from 10 last visit. Pain: Initial:    Post Session:  0/10 no pain stated he felt great   Medications Last Reviewed:  2019  Updated Objective Findings:  Pt. had increased pain with flexion greater than 90 degrees. TREATMENT:   THERAPEUTIC EXERCISE: (30 minutes):    Exercises per grid below to improve mobility, strength, balance and coordination. Required minimal verbal cues to promote proper body alignment and promote proper body posture. Progressed resistance, range, repetitions and complexity of movement as indicated.      Date:  2019 Date:  19 Date:  19   Activity/Exercise Parameters Parameters Parameters   UBE   Level 2 x 3 mins fwd & 3 mins bkwd   Shoulder flexion  Green band x 10 reps 5 sec hold Green band x 10 reps 5 sec hold    Shoulder extension  X 10 reps green band 5 sec hold  X 10 reps green 5 sec hold    Shoulder internal rotation  X 10 reps 5 sec hold green band X 10 reps 5 sec hold green band   Shoulder external rotation  X 10 reps 5 sec hold green band X 10 reps 5 sec hold green band   Wall pushups  X 10 reps 5 sec hold  X 10 reps 5 sec hold    Reverse wall pushups  X 10 reps 5 sec hold  X 10 reps 5 sec hold    Wall pulley  Flexion, scaption, & abduction x 5 mins  Flexion, abduction, and scaption x 5 mins    Shoulder rows   Green band x 10 reps 5 sec hold each  Green band x 10 reps 5 sec hold each    Low shoulder rows   Green band x 10 reps 5 sec hold each Green band x 10 reps 5 sec hold each        MANUAL THERAPY: (15  minutes): Soft tissue mobilization was utilized and necessary because of the patient's restricted motion of soft tissue   Pt. Received PROM to left shoulder in all planes to tolerance with gentle distraction and oscillation to decrease pain and increase range of motion. MODALITIES: (20 minutes): *  Ultrasound was used today secondary to the patient having tightened structures limiting joint motion that require an increase in extensibility. Ultrasound was used today to reduce pain, reduce spasm, reduce joint stiffness and increase muscle flexibility. *  Cold Pack Therapy in order to provide analgesia and reduce inflammation and edema. Ultrasound x 10 minutes to L deltoid while seated at 1.5 w/cm with ultrasound gel and biofreeze. Cold pack x 10 minutes to L shoulder after therapy     HEP: As above; handouts given to patient for all exercises. Treatment/Session Summary:    · Response to Treatment:  Pt. was explained to when you start a new exercise program  initally you will be sore. Pt. reported no pain at the end of sesion today. .  · Communication/Consultation:  demonstration was performed with every exercise for clear inderstanding. · Equipment provided today:  green t-band was given for home usage with HEP  · Recommendations/Intent for next treatment session: Next visit will focus on L shoulder conditioning/stability followed with cold pack .     Total Treatment Billable Duration: 65 mins   PT Patient Time In/Time Out  Time In: 1100  Time Out: [address], [place]

## 2019-07-02 ENCOUNTER — HOSPITAL ENCOUNTER (OUTPATIENT)
Dept: PHYSICAL THERAPY | Age: 64
Discharge: HOME | End: 2019-07-02
Payer: COMMERCIAL

## 2019-07-02 PROCEDURE — 97140 MANUAL THERAPY 1/> REGIONS: CPT

## 2019-07-02 PROCEDURE — 97035 APP MDLTY 1+ULTRASOUND EA 15: CPT

## 2019-07-02 PROCEDURE — 97110 THERAPEUTIC EXERCISES: CPT

## 2019-07-02 NOTE — PROGRESS NOTES
Karrie Balderrama  : 1955  Primary: [facility] [employer]*  Secondary:  [facility] [place] [address], [place], [address]  [phone]   [phone]      OUTPATIENT PHYSICAL THERAPY: Daily Treatment Note 2019    ICD-10: Treatment Diagnosis:M25.512 pain in joint/L shoulder                 Treatment Diagnosis 2: M25.612 stiffness in joint/L shoulder                 Treatment Diagnosis 3:   Precautions: overhead lifting  Allergies: Other plant, animal, environmental  TREATMENT PLAN:  Effective Dates: 2019 TO 19 (45 days). Frequency/Duration: 1-2  time a week for 45 Day(s) MEDICAL/REFERRING DIAGNOSIS:  Left shoulder pain [M25.512]  DATE OF ONSET: on and off x 3 years   REFERRING PHYSICIAN: Sylvester Nickerson MD MD Orders: Evaluate and Treat   Return MD Appointment: unsure     Pre-treatment Symptoms/Complaints: Pt. Reported he felt ice helped him a lot with his hip issue but not as much with his shoulder . Pain: Initial: Pain Intensity 1: 2  Pain Location 1: Shoulder  Pain Orientation 1: Anterior, Left  Pain Intervention(s) 1: Cold pack, Exercise  Post Session:  1/10 -sore in anterior deltoid but good improvement   Medications Last Reviewed:  2019  Updated Objective Findings:  Pt. had increased pain with flexion greater than 90 degrees. TREATMENT:   THERAPEUTIC EXERCISE: (25 minutes):    Exercises per grid below to improve mobility, strength, balance and coordination. Required minimal verbal cues to promote proper body alignment and promote proper body posture. Progressed resistance, range, repetitions and complexity of movement as indicated.      Date:  19 Date:  19 Date:  19   Activity/Exercise Parameters Parameters Parameters   UBE   Level 2 x 3 mins fwd & 3 mins bkwd   Shoulder flexion X 20 with green band and 5 second hold Green band x 10 reps 5 sec hold Green band x 10 reps 5 sec hold    Shoulder extension X 20 with green band-5 second hold X 10 reps green band 5 sec hold  X 10 reps green 5 sec hold    Shoulder internal rotation X 20 with green band -5 second hold X 10 reps 5 sec hold green band X 10 reps 5 sec hold green band   Shoulder external rotation X 20 with green band-5 second hold X 10 reps 5 sec hold green band X 10 reps 5 sec hold green band   Wall pushups  X 10 reps 5 sec hold  X 10 reps 5 sec hold    Reverse wall pushups  X 10 reps 5 sec hold  X 10 reps 5 sec hold    Wall pushups X 20     Reverse wall pushups X 20     Wall pulley  Flexion, scaption, & abduction x 5 mins  Flexion, abduction, and scaption x 5 mins    Shoulder rows  X 20 with green band-5 second hold Green band x 10 reps 5 sec hold each  Green band x 10 reps 5 sec hold each    Low shoulder rows  X 20 with green band-5 second hold Green band x 10 reps 5 sec hold each Green band x 10 reps 5 sec hold each        MANUAL THERAPY: (12  minutes): Soft tissue mobilization was utilized and necessary because of the patient's restricted motion of soft tissue   Pt. Received cross friction work to anterior deltoid while seated to decrease pain and increase range of motion x 12 minutes. MODALITIES: (22 minutes): *  Ultrasound was used today secondary to the patient having tightened structures limiting joint motion that require an increase in extensibility. Ultrasound was used today to reduce pain, reduce spasm, reduce joint stiffness and increase muscle flexibility. *  Cold Pack Therapy in order to provide analgesia and reduce inflammation and edema. Ultrasound x 10 minutes to L anterior deltoid while seated at 1.5 w/cm     interferential muscle stimulation with cold pack to L deltoid while seated after exercises x 12 minutes     HEP: As above; handouts given to patient for all exercises.     Treatment/Session Summary:    · Response to Treatment:full motion -mid soreness with shoulder flexion at anterior deltoid Layla Lofton · Communication/Consultation:  demonstration was performed with every exercise for clear inderstanding.- present with therapy/instructions  · Equipment provided today:  None today  · Recommendations/Intent for next treatment session: Next visit will focus on L shoulder conditioning/stability followed with cold pack . -push anterior deltoid conditioning     Total Treatment Billable Duration: 59 minutes   PT Patient Time In/Time Out  Time In: 1100  Time Out: Robbin Lancaster PT

## 2019-07-09 ENCOUNTER — HOSPITAL ENCOUNTER (OUTPATIENT)
Dept: PHYSICAL THERAPY | Age: 64
Discharge: HOME | End: 2019-07-09
Payer: COMMERCIAL

## 2019-07-09 PROCEDURE — 97110 THERAPEUTIC EXERCISES: CPT

## 2019-07-09 PROCEDURE — 97035 APP MDLTY 1+ULTRASOUND EA 15: CPT

## 2019-07-09 PROCEDURE — 97140 MANUAL THERAPY 1/> REGIONS: CPT

## 2019-07-09 NOTE — PROGRESS NOTES
Ernst Castellon  : 1955  Primary: [facility]*  Secondary:  [address] at [place] [address], [place], [address]  [phone]   [phone]       OUTPATIENT PHYSICAL THERAPY:Progress Report 2019    ICD-10: Treatment Diagnosis: M25.512 pain in joint/L shoulder                 Treatment Diagnosis 2: M25.612 stiffness in joint/L shoulder                 Treatment Diagnosis 3:   Precautions: over head work/lifting  Allergies: Other plant, animal, environmental   TREATMENT PLAN:  Effective Dates: 2019 TO 19 (45 days).     Frequency/Duration: 1-2 time a week for 45 Day(s) MEDICAL/REFERRING DIAGNOSIS:  Left shoulder pain [M25.512]   DATE OF ONSET:on and off shoulder pain x 3 years   REFERRING PHYSICIAN: Katrina Macdonald MD MD Orders: Evaluate and Treat   Return MD Appointment: unsure     INITIAL ASSESSMENT:  Mr. Chhaya Gonzalez is a 70 y.o. male presenting to physical therapy with complaints of on and off L shoulder pain x 3 years -pain level is 3/10 at rest and increased to 5-6/10 with overhead work/lifting/painting -shoulder range is actively wfl -strength is wfl below shoulder level -no radicular symptoms-no headaches -normal arm swing with gait-mild tender at L teres and upper trapezius -sore to palpation at anterior deltoid  -able to take off shirt with no pain -cervical range is wfl - very good candidate for skilled physical therapy to include manual therapy, pain modalities and therapeutic exercises followed with cold pack  (needs his daughter in law to interpret -[place] speaking )    Patient has been seen for  5 visits of L shoulder rehab with good progress from 19 to 19-shoulder pain is in anterior deltoid at 2/10 with mild discomfort with active  shoulder flexion-shoulder range of motion is wfl -shoulder strength is now 4+/5 -independent with home exercise program-normal arm swing with gait-increased function and able to sleep on L shoulder -motivated patient and pleasant gentleman to work with-continue current regimen emphasizing L shoulder flexion/exerciss followed with cold pack    PROBLEM LIST (Impacting functional limitations):  1. Decreased ADL/Functional Activities  2. Decreased Flexibility/Joint Mobility INTERVENTIONS PLANNED: (Treatment may consist of any combination of the following)  1. Cold  2. Electrical Stimulation  3. Heat  4. Home Exercise Program (HEP)  5. Manual Therapy  6. Range of Motion (ROM)  7. Therapeutic Exercise/Strengthening  8. Ultrasound (US)     GOALS: (Goals have been discussed and agreed upon with patient.)  Short-Term Functional Goals: Time Frame: 6/12/2019 to 6/26/19  1. Patient demonstrates independence with home exercise program without verbal cueing provided by therapist.-GOAL MET  2. Shoulder pain is less than 3/10 to progress to DC goal -GOAL MET  3. Decreased anterior deltoid tenderness-GOAL MET  4. Able to perform mild overhead lifting-GOAL MET  Instruction in exercise program to allow increased ADLs. -GOAL MET  Discharge Goals: Time Frame: 6/12/2019 to 7/26/19  1. Shoulder pain is minimal to allow full home ADLs including playing/lifting grandchildren-PROGRESSING  2. 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 Able to sleep on L side with no pain -GOAL MET  4. 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 DASH score is improved from 31/55 to 16/5592-OKEASXF-AYCYSE SCORE IS 27/55    Outcome Measure: Tool Used: Disabilities of the Arm, Shoulder and Hand (DASH) Questionnaire - Quick Version  Score:  Initial: 31/55  Most Recent: 27/55 (Date: 7-9-19-- )   Interpretation of Score: The DASH is designed to measure the activities of daily living in person's with upper extremity dysfunction or pain. Each section is scored on a 1-5 scale, 5 representing the greatest disability.   The scores of each section are added together for a total score of 55. Observation/Orthostatic Postural Assessment:          Ambulates independently with less  rounded shoulders and forward head on neck posture   Palpation:        Less tight  L upper trapezius and less tender at teres and anterior deltoid  ROM:        Shoulder range of motion is wfl actively bilaterally  Strength:         4 to 4+/5 at below shoulder level      is 69 on L hand and 60 on right     Special Tests:         Negative cervical distraction/supraspinatus isolation test/drop arm test and negative lift off test     Positive obriens sign for L labrum issues    Neurological Screen:  No radiating pain or numbness or tingling into lower arm   Mental Status:          Alert and oriented   Sensation:      Intact to light touch        Medical Necessity:   · Patient is expected to demonstrate progress in strength, range of motion, balance, coordination and functional technique to increase independence with ADLs and improve safety during dressing and driving . · Skilled intervention continues to be required due to L shoulder pain is affecting function and sleep. Reason for Services/Other Comments:  · Patient continues to require modification of therapeutic interventions to increase complexity of exercises. Total Evaluation Duration: 20 minutes    Rehabilitation Potential For Stated Goals: Good  Regarding [employer], I certify that the treatment plan above will be carried out by a therapist or under their direction.   Thank you for this referral,  Paula Wise PT     Referring Physician Signature: Katrina Macdonald MD              Date                     PAIN/SUBJECTIVE:                                                                 1. 1.  1.

## 2019-07-09 NOTE — PROGRESS NOTES
Ana Mccoy  : 1955  Primary: [facility]*  Secondary:  [facility] at [place] [address], [place], [address]  [phone]   [phone]      OUTPATIENT PHYSICAL THERAPY: Daily Treatment Note 2019    ICD-10: Treatment Diagnosis:M25.512 pain in joint/L shoulder                 Treatment Diagnosis 2: M25.612 stiffness in joint/L shoulder                 Treatment Diagnosis 3:   Precautions: overhead lifting  Allergies: Other plant, animal, environmental  TREATMENT PLAN:  Effective Dates: 2019 TO 19 (45 days). Frequency/Duration: 1-2  time a week for 45 Day(s) MEDICAL/REFERRING DIAGNOSIS:  Left shoulder pain [M25.512]  DATE OF ONSET: on and off x 3 years   REFERRING PHYSICIAN: Gabbi Gupta MD MD Orders: Evaluate and Treat   Return MD Appointment: unsure     Pre-treatment Symptoms/Complaints: Pt. Reported he felt ice helped him a lot with his hip issue but not as much with his shoulder . Pain: Initial: Pain Intensity 1: 2  Pain Location 1: Shoulder  Pain Orientation 1: Anterior, Left  Pain Intervention(s) 1: Cold pack, Exercise  Post Session:  1/10 -minimal sore in anterior deltoid but good range of motiont   Medications Last Reviewed:  2019  Updated Objective Findings: increased active mobility to full with less anterior shoulder pain    TREATMENT:   THERAPEUTIC EXERCISE: (20 minutes):    Exercises per grid below to improve mobility, strength, balance and coordination. Required minimal verbal cues to promote proper body alignment and promote proper body posture. Progressed resistance, range, repetitions and complexity of movement as indicated.      Date:  19 Date:  19 Date:  19   Activity/Exercise Parameters Parameters Parameters   UBE   Level 2 x 3 mins fwd & 3 mins bkwd   Shoulder flexion X 20 with green band and 5 second hold Green band -2 x 10 reps  3 sec hold Green band x 10 reps 5 sec hold Shoulder extension X 20 with green band-5 second hold 2 x 10 reps with 3 second hold  X 10 reps green 5 sec hold    Shoulder internal rotation X 20 with green band -5 second hold 2 x 10 reps with 3 second hold  X 10 reps 5 sec hold green band   Shoulder external rotation X 20 with green band-5 second hold 2 x 10 reps with 3 second hold  X 10 reps 5 sec hold green band   Wall pushups  2 x 10 reps  X 10 reps 5 sec hold    Reverse wall pushups  2 x 10 reps  X 10 reps 5 sec hold    Wall pushups X 20     Reverse wall pushups X 20     Wall pulley   Flexion, abduction, and scaption x 5 mins    Shoulder rows  X 20 with green band-5 second hold 2 x 10 reps with green band and 3 second hold  Green band x 10 reps 5 sec hold each    Low shoulder rows  X 20 with green band-5 second hold 2 x 10 reps with green band and 3 second hold  Green band x 10 reps 5 sec hold each        MANUAL THERAPY: (26  minutes): Soft tissue mobilization was utilized and necessary because of the patient's restricted motion of soft tissue   Pt. Received cross friction work to anterior deltoid while seated to decrease pain and increase range of motion x 12 minutes.  glenohumeral and sternoclavicular mobilization followed with passive range of motion and counterstrain techniques to anterior deltoid while supine x 14 minutes       MODALITIES: (10 minutes): *  Ultrasound was used today secondary to the patient having tightened structures limiting joint motion that require an increase in extensibility. Ultrasound was used today to reduce pain, reduce spasm, reduce joint stiffness and increase muscle flexibility. *  Cold Pack Therapy in order to provide analgesia and reduce inflammation and edema. Ultrasound x 10 minutes to L anterior deltoid while seated at 1.5 w/cm     Cold pack to L deltoid while  seated after exercises x 8 minutes    HEP: As above; handouts given to patient for all exercises.     Treatment/Session Summary: · Response to Treatment:full motion -mid soreness with shoulder flexion at anterior deltoid  But overall good improvement with mobility and strength . · Communication/Consultation:  demonstration was performed with every exercise for clear inderstanding.- present with therapy/instructions  · Equipment provided today:  None today  · Recommendations/Intent for next treatment session: Next visit will focus on L shoulder conditioning/stability followed with cold pack . -push anterior deltoid conditioning     Total Treatment Billable Duration: 64 minutes   PT Patient Time In/Time Out  Time In: 1330  Time Out: 6677 Roman Hager, PT

## 2019-07-11 ENCOUNTER — HOSPITAL ENCOUNTER (OUTPATIENT)
Dept: PHYSICAL THERAPY | Age: 64
Discharge: HOME | End: 2019-07-11
Payer: COMMERCIAL

## 2019-07-11 PROCEDURE — 97035 APP MDLTY 1+ULTRASOUND EA 15: CPT

## 2019-07-11 PROCEDURE — 97140 MANUAL THERAPY 1/> REGIONS: CPT

## 2019-07-11 PROCEDURE — 97110 THERAPEUTIC EXERCISES: CPT

## 2019-07-11 NOTE — PROGRESS NOTES
Nhung Madeira  : 1955  Primary: [facility]*  Secondary:  [facility] at [address], [address]  [phone]   [phone]      OUTPATIENT PHYSICAL THERAPY: Daily Treatment Note 2019    ICD-10: Treatment Diagnosis:M25.512 pain in joint/L shoulder                 Treatment Diagnosis 2: M25.612 stiffness in joint/L shoulder                 Treatment Diagnosis 3:   Precautions: overhead lifting  Allergies: Other plant, animal, environmental  TREATMENT PLAN:  Effective Dates: 2019 TO 19 (45 days). Frequency/Duration: 1-2  time a week for 45 Day(s) MEDICAL/REFERRING DIAGNOSIS:  Left shoulder pain [M25.512]  DATE OF ONSET: on and off x 3 years   REFERRING PHYSICIAN: Sabrina Gilliland MD MD Orders: Evaluate and Treat   Return MD Appointment: unsure     Pre-treatment Symptoms/Complaints: Pt. Reported minimal improvements with his left shoulder is because he has had this pan for a while and its going to take a while for improvements. Pain: Initial: Pain Intensity 1: 2  Pain Location 1: Shoulder  Pain Orientation 1: Anterior, Left  Pain Intervention(s) 1: Cold pack, Exercise  Post Session:  0/10 no pain   Medications Last Reviewed:  2019  Updated Objective Findings: Pt. Demonstrated good range of motion with no pain. TREATMENT:   THERAPEUTIC EXERCISE: (30  minutes):    Exercises per grid below to improve mobility, strength, balance and coordination. Required minimal verbal cues to promote proper body alignment and promote proper body posture. Progressed resistance, range, repetitions and complexity of movement as indicated.      Date:  19 Date:  19 Date:  19   Activity/Exercise Parameters Parameters Parameters   UBE   Level 2 x 3 mins fwd & 3 mins bkwd   Shoulder flexion X 20 with green band and 5 second hold Green band -2 x 10 reps  3 sec hold Blue  band x 10 reps 5 sec hold    Shoulder extension X 20 with green band-5 second hold 2 x 10 reps with 3 second hold  X 10 reps blue band  5 sec hold    Shoulder internal rotation X 20 with green band -5 second hold 2 x 10 reps with 3 second hold  X 10 reps 5 sec hold blue band   Shoulder external rotation X 20 with green band-5 second hold 2 x 10 reps with 3 second hold  X 10 reps 5 sec hold blue band   Reverse wall pushups  2 x 10 reps  3 X 10 reps 5 sec hold    Wall pushups X 20  3x10 reps    Reverse wall pushups X 20  3x10 reps    Wall pulley   Flexion, abduction, and scaption x 5 mins    Shoulder rows  X 20 with green band-5 second hold 2 x 10 reps with green band and 3 second hold  Blue  band 2 x 10 reps 5 sec hold each    Low shoulder rows  X 20 with green band-5 second hold 2 x 10 reps with green band and 3 second hold   blue band 2 x 10 reps 5 sec hold each        MANUAL THERAPY: (15  minutes): Soft tissue mobilization was utilized and necessary because of the patient's restricted motion of soft tissue   Pt. Received soft tissue mobilization to left shoulder and neck to decrease pain and tightness. MODALITIES: (10 minutes): *  Ultrasound was used today secondary to the patient having tightened structures limiting joint motion that require an increase in extensibility. Ultrasound was used today to reduce pain, reduce spasm, reduce joint stiffness and increase muscle flexibility. *  Cold Pack Therapy in order to provide analgesia and reduce inflammation and edema. Ultrasound x 10 minutes to L anterior deltoid while seated at 1.5 w/cm       HEP: As above; handouts given to patient for all exercises. Treatment/Session Summary:    · Response to Treatment: Pt was compliant with all exercises and reported no pain at the end of today's session.    · Communication/Consultation:  demonstration was performed with every exercise for clear inderstanding.- present with therapy/instructions  · Equipment provided today:  None today  · Recommendations/Intent for next treatment session: continue left shoulder strengthening and modalities as needed for pain management.     Total Treatment Billable Duration: 55 minutes   PT Patient Time In/Time Out  Time In: 1100  Time Out: 3200 [facility]

## 2019-07-16 ENCOUNTER — HOSPITAL ENCOUNTER (OUTPATIENT)
Dept: PHYSICAL THERAPY | Age: 64
Discharge: HOME | End: 2019-07-16
Payer: COMMERCIAL

## 2019-07-16 PROCEDURE — 97140 MANUAL THERAPY 1/> REGIONS: CPT

## 2019-07-16 PROCEDURE — 97110 THERAPEUTIC EXERCISES: CPT

## 2019-07-16 PROCEDURE — 97035 APP MDLTY 1+ULTRASOUND EA 15: CPT

## 2019-07-16 NOTE — PROGRESS NOTES
[facility]  : 1955  Primary: [facility] Pablo [facility]*  Secondary:  [facility]rapy [facility] at [address], [address]  [phone]   [phone]      OUTPATIENT PHYSICAL THERAPY: Daily Treatment Note 2019    ICD-10: Treatment Diagnosis:M25.512 pain in joint/L shoulder                 Treatment Diagnosis 2: M25.612 stiffness in joint/L shoulder                 Treatment Diagnosis 3:   Precautions: overhead lifting  Allergies: Other plant, animal, environmental  TREATMENT PLAN:  Effective Dates: 2019 TO 19 (45 days). Frequency/Duration: 1-2  time a week for 45 Day(s) MEDICAL/REFERRING DIAGNOSIS:  Left shoulder pain [M25.512]  DATE OF ONSET: on and off x 3 years   REFERRING PHYSICIAN: Sofi Fraire MD MD Orders: Evaluate and Treat   Return MD Appointment: unsure     Pre-treatment Symptoms/Complaints: Pt. Reported he feels he is stronger with only minimal pain today . Pain: Initial: Pain Intensity 1: 2  Pain Location 1: Shoulder  Pain Orientation 1: Anterior, Left  Pain Intervention(s) 1: Cold pack, Exercise, Heat applied, Massage  Post Session:  1/10 -minimal sore in anterior deltoid but good range of motion and strength with improved function-   Medications Last Reviewed:  2019  Updated Objective Findings: increased active mobility to full with less anterior shoulder pain    TREATMENT:   THERAPEUTIC EXERCISE: (28 minutes):    Exercises per grid below to improve mobility, strength, balance and coordination. Required minimal verbal cues to promote proper body alignment and promote proper body posture. Progressed resistance, range, repetitions and complexity of movement as indicated.      Date:  19 Date:  19 Date:  19   Activity/Exercise Parameters Parameters Parameters   UBE   X 12 minutes at level 4    Shoulder flexion X 20 with green band and 5 second hold Green band -2 x 10 reps  3 sec hold 2 x 20 with green band   Shoulder extension X 20 with green band-5 second hold 2 x 10 reps with 3 second hold  2 x 20 with green band   Shoulder internal rotation X 20 with green band -5 second hold 2 x 10 reps with 3 second hold  2 x 20 with green band   Shoulder external rotation X 20 with green band-5 second hold 2 x 10 reps with 3 second hold  2 x 20 with green band   Wall pushups  2 x 10 reps  X 20    Reverse wall pushups  2 x 10 reps  X 20                Wall pulley      Shoulder rows  X 20 with green band-5 second hold 2 x 10 reps with green band and 3 second hold  X 20 with green band   Low shoulder rows  X 20 with green band-5 second hold 2 x 10 reps with green band and 3 second hold  X 20 with green band       MANUAL THERAPY: (15  minutes): Soft tissue mobilization was utilized and necessary because of the patient's restricted motion of soft tissue   Pt. Received cross friction work to anterior deltoid while seated to decrease pain and increase range of motion x 15 minutes.         MODALITIES: (10 minutes): *  Ultrasound was used today secondary to the patient having tightened structures limiting joint motion that require an increase in extensibility. Ultrasound was used today to reduce pain, reduce spasm, reduce joint stiffness and increase muscle flexibility. *  Cold Pack Therapy in order to provide analgesia and reduce inflammation and edema. Ultrasound x 10 minutes to L anterior deltoid while seated at 1.5 w/cm     Cold pack to be used at home     HEP: As above; handouts given to patient for all exercises. Treatment/Session Summary:    · Response to Treatment:full active  motion -minimal to no pain -strength is 4- to 4/5 -good progress toward goals  .   · Communication/Consultation:  demonstration was performed with every exercise for clear inderstanding.- present with therapy/instructions  · Equipment provided today:  None today  · Recommendations/Intent for next treatment session: Next visit will focus on L shoulder conditioning/stability followed with cold pack . -push anterior deltoid conditioning-possible DC on 7-23-19     Total Treatment Billable Duration:53 minutes   PT Patient Time In/Time Out  Time In: 1100  Time Out: Robbin Lancaster PT

## 2019-07-18 ENCOUNTER — HOSPITAL ENCOUNTER (OUTPATIENT)
Dept: PHYSICAL THERAPY | Age: 64
Discharge: HOME | End: 2019-07-18
Payer: COMMERCIAL

## 2019-07-18 PROCEDURE — 97110 THERAPEUTIC EXERCISES: CPT

## 2019-07-18 PROCEDURE — 97035 APP MDLTY 1+ULTRASOUND EA 15: CPT

## 2019-07-18 PROCEDURE — 97140 MANUAL THERAPY 1/> REGIONS: CPT

## 2019-07-18 NOTE — PROGRESS NOTES
Levi Broadlands  : 1955  Primary: [facility]ers [facility]*  Secondary:  [facility] at [place] [address], [place], [address]  [phone]   [phone]      OUTPATIENT PHYSICAL THERAPY: Daily Treatment Note 2019    ICD-10: Treatment Diagnosis:M25.512 pain in joint/L shoulder                 Treatment Diagnosis 2: M25.612 stiffness in joint/L shoulder                 Treatment Diagnosis 3:   Precautions: overhead lifting  Allergies: Other plant, animal, environmental  TREATMENT PLAN:  Effective Dates: 2019 TO 19 (45 days). Frequency/Duration: 1-2  time a week for 45 Day(s) MEDICAL/REFERRING DIAGNOSIS:  Left shoulder pain [M25.512]  DATE OF ONSET: on and off x 3 years   REFERRING PHYSICIAN: Adriana Menendez MD MD Orders: Evaluate and Treat   Return MD Appointment: unsure     Pre-treatment Symptoms/Complaints: Pt. Reported less pain but increases with overuse or certain movements. Pain: Initial: Pain Intensity 1: 1  Pain Location 1: Shoulder  Pain Orientation 1: Left, Anterior  Pain Intervention(s) 1: Cold pack, Exercise  Post Session: 0/10 no pain   Medications Last Reviewed:  2019  Updated Objective Findings: Pt. Had excellent strength and mobility with modalities and manual first.    TREATMENT:   THERAPEUTIC EXERCISE: (30 minutes):    Exercises per grid below to improve mobility, strength, balance and coordination. Required minimal verbal cues to promote proper body alignment and promote proper body posture. Progressed resistance, range, repetitions and complexity of movement as indicated.      Date:  19 Date:  19 Date:  19   Activity/Exercise Parameters Parameters Parameters   UBE X 4 mins fwd & 4 mins backward level 4   X 12 minutes at level 4    Shoulder flexion 2x10 reps blue band  Green band -2 x 10 reps  3 sec hold 2 x 20 with green band   Shoulder extension X 20 with  blue band 5  second hold 2 x 10 reps with 3 second hold  2 x 20 with green band   Shoulder internal rotation X 20 with blue  band  5 second hold 2 x 10 reps with 3 second hold  2 x 20 with green band   Shoulder external rotation X 20 with blue band 5 second hold 2 x 10 reps with 3 second hold  2 x 20 with green band   Wall pushups X 20 reps  2 x 10 reps  X 20    Reverse wall pushups X 20 reps  2 x 10 reps  X 20                Shoulder rows  X 20 reps blue band 5 sec hold each  2 x 10 reps with green band and 3 second hold  X 20 with green band   Low shoulder rows  X 20 with blue band 5 second hold 2 x 10 reps with green band and 3 second hold  X 20 with green band       MANUAL THERAPY: (15  minutes): Soft tissue mobilization was utilized and necessary because of the patient's restricted motion of soft tissue   Pt. Received soft tissue mobilization to decrease pain and muscular tightness in left anterior shoulder.  MODALITIES: (10 minutes): *  Ultrasound was used today secondary to the patient having tightened structures limiting joint motion that require an increase in extensibility. Ultrasound was used today to reduce pain, reduce spasm, reduce joint stiffness and increase muscle flexibility. *  Cold Pack Therapy in order to provide analgesia and reduce inflammation and edema. Ultrasound x 10 minutes to L anterior deltoid while seated at 1.5 w/cm     Cold pack to be used at home     HEP: As above; handouts given to patient for all exercises. Treatment/Session Summary:    · Response to Treatment: Pt. Was compliant with all exercises and reported no pain at the end of today's session. .  · Communication/Consultation:  demonstration was performed with every exercise for clear inderstanding.- present with therapy/instructions  · Equipment provided today:  None today  · Recommendations/Intent for next treatment session: Next visit will focus on L shoulder conditioning/stability followed with cold pack . - encourage anterior deltoid conditioning-possible DC on 7-23-19     Total Treatment Billable Duration:  55 minutes   PT Patient Time In/Time Out  Time In: 1100  Time Out: 1201 [facility], [facility]

## 2019-07-23 ENCOUNTER — HOSPITAL ENCOUNTER (OUTPATIENT)
Dept: PHYSICAL THERAPY | Age: 64
Discharge: HOME | End: 2019-07-23
Payer: COMMERCIAL

## 2019-07-23 PROCEDURE — 97110 THERAPEUTIC EXERCISES: CPT

## 2019-07-23 PROCEDURE — 97140 MANUAL THERAPY 1/> REGIONS: CPT

## 2019-07-23 PROCEDURE — 97035 APP MDLTY 1+ULTRASOUND EA 15: CPT

## 2019-07-23 NOTE — PROGRESS NOTES
Nhung Madeira  : 1955  Primary: [employer]*  Secondary:  [facility] [place] [address], [place], [address]  [phone]   [phone]      OUTPATIENT PHYSICAL THERAPY: Daily Treatment Note 2019    ICD-10: Treatment Diagnosis:M25.512 pain in joint/L shoulder                 Treatment Diagnosis 2: M25.612 stiffness in joint/L shoulder                 Treatment Diagnosis 3:   Precautions: overhead lifting  Allergies: Other plant, animal, environmental  TREATMENT PLAN:  Effective Dates: 2019 TO 19 (45 days). Frequency/Duration: 1-2  time a week for 45 Day(s) MEDICAL/REFERRING DIAGNOSIS:  Left shoulder pain [M25.512]  DATE OF ONSET: on and off x 3 years   REFERRING PHYSICIAN: Sabrina Gilliland MD MD Orders: Evaluate and Treat   Return MD Appointment: unsure     Pre-treatment Symptoms/Complaints: Pt. 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 Pain: Initial: Pain Intensity 1: 0  Pain Location 1: Shoulder  Pain Orientation 1: Left, Anterior  Pain Intervention(s) 1: Cutaneous stimulation, Exercise  Post Session:  0/10 -no pain with increased exercises-normal function-DC to home exercises   Medications Last Reviewed:  2019  Updated Objective Findings: increased active mobility to full with less anterior shoulder pain    TREATMENT:   THERAPEUTIC EXERCISE: (35 minutes):    Exercises per grid below to improve mobility, strength, balance and coordination. Required minimal verbal cues to promote proper body alignment and promote proper body posture. Progressed resistance, range, repetitions and complexity of movement as indicated.      Date:  19 Date:  19 Date:  19   Activity/Exercise Parameters Parameters Parameters   UBE X 15 minutes at level 4-alternating each minutes  X 12 minutes at level 4    Shoulder flexion X 25 with green band and 3 second hold Green band -2 x 10 reps  3 sec hold 2 x 20 with green band   Shoulder extension X 25 with green band-3 second hold 2 x 10 reps with 3 second hold  2 x 20 with green band   Shoulder internal rotation X 25 with green band -3 second hold 2 x 10 reps with 3 second hold  2 x 20 with green band   Shoulder external rotation X 25 with green band-3 second hold 2 x 10 reps with 3 second hold  2 x 20 with green band   Wall pushups X 25 2 x 10 reps  X 20    Reverse wall pushups X 25 2 x 10 reps  X 20          Shoulder retractions X 25 with green band and 3 second hold     Wall nir      Shoulder rows  X 25 with green band-3 second hold 2 x 10 reps with green band and 3 second hold  X 20 with green band   Low shoulder rows  X 25 with green band-3 second hold 2 x 10 reps with green band and 3 second hold  X 20 with green band       MANUAL THERAPY: (12  minutes): Soft tissue mobilization was utilized and necessary because of the patient's restricted motion of soft tissue   Pt. Received cross friction work to anterior deltoid while seated to decrease pain and increase range of motion x 12 minutes.         MODALITIES: (10 minutes): *  Ultrasound was used today secondary to the patient having tightened structures limiting joint motion that require an increase in extensibility. Ultrasound was used today to reduce pain, reduce spasm, reduce joint stiffness and increase muscle flexibility. *  Cold Pack Therapy in order to provide analgesia and reduce inflammation and edema. Ultrasound x 10 minutes to L anterior deltoid while seated at 1.5 w/cm     Cold pack x 8 minutes to L deltoid while seated after exercises    HEP: As above; handouts given to patient for all exercises.     Treatment/Session Summary:    · Response to Treatment:full active  motion -minimal to no pain -strength is 4- to 4/5 -good progress toward goals -DC to home program  -advised to use ice during work breaks and during lunch Layla Lofton   · Communication/Consultation:  demonstration was performed with every exercise for clear inderstanding.- present with therapy/instructions  · Equipment provided today:  None today  · Recommendations/Intent for next treatment session:DC to home exercise program      Total Treatment Billable Duration:57 minutes   PT Patient Time In/Time Out  Time In: 1100  Time Out: 2300 [address]

## 2019-07-23 NOTE — THERAPY DISCHARGE
Chandra Haas  : 1955  Primary: [facility] [employer]*  Secondary:  [address] at [address], [address] [place] [address] Street  [phone]   [phone]       OUTPATIENT PHYSICAL [facility] 2019    ICD-10: Treatment Diagnosis: M25.512 pain in joint/L shoulder                 Treatment Diagnosis 2: M25.612 stiffness in joint/L shoulder                 Treatment Diagnosis 3:   Precautions: over head work/lifting  Allergies: Other plant, animal, environmental   TREATMENT PLAN:  Effective Dates: 2019 TO 19 (45 days).     Frequency/Duration: 1-2 time a week for 45 Day(s) MEDICAL/REFERRING DIAGNOSIS:  Left shoulder pain [M25.512]   DATE OF ONSET:on and off shoulder pain x 3 years   REFERRING PHYSICIAN: Joan Pablo MD MD Orders: Evaluate and Treat   Return MD Appointment: unsure     INITIAL ASSESSMENT:  Mr. Gita Huff is a 70 y.o. male presenting to physical therapy with complaints of on and off L shoulder pain x 3 years -pain level is 3/10 at rest and increased to 5-6/10 with overhead work/lifting/painting -shoulder range is actively wfl -strength is wfl below shoulder level -no radicular symptoms-no headaches -normal arm swing with gait-mild tender at L teres and upper trapezius -sore to palpation at anterior deltoid  -able to take off shirt with no pain -cervical range is wfl - very good candidate for skilled physical therapy to include manual therapy, pain modalities and therapeutic exercises followed with cold pack  (needs his daughter in law to interpret -[place] speaking )    Patient has been seen for  9 visits of L shoulder rehab with good progress from 19 to 19-shoulder pain is in anterior deltoid is minimal at 0-1/10  -pain free active  shoulder range of motion -shoulder range of motion is wfl -shoulder strength is now 4+/5 -independent with home exercise program-normal arm swing with gait-increased function and able to sleep on L shoulder-full cervical mobility - -motivated patient and pleasant gentleman to work with-DASH score has improved from 31/55 to 17/55-goals met -DC to home exercises -       PROBLEM LIST (Impacting functional limitations):  1. Decreased ADL/Functional Activities  2. Decreased Flexibility/Joint Mobility INTERVENTIONS PLANNED: (Treatment may consist of any combination of the following)  1. Cold  2. Electrical Stimulation  3. Heat  4. Home Exercise Program (HEP)  5. Manual Therapy  6. Range of Motion (ROM)  7. Therapeutic Exercise/Strengthening  8. Ultrasound (US)     GOALS: (Goals have been discussed and agreed upon with patient.)  Short-Term Functional Goals: Time Frame: 6/12/2019 to 6/26/19  1. Patient demonstrates independence with home exercise program without verbal cueing provided by therapist.-GOAL MET  2. Shoulder pain is less than 3/10 to progress to DC goal -GOAL MET  3. Decreased anterior deltoid tenderness-GOAL MET  4. Able to perform mild overhead lifting-GOAL MET  Instruction in exercise program to allow increased ADLs. -GOAL MET  Discharge Goals: Time Frame: 6/12/2019 to 7/26/19  1. Shoulder pain is minimal to allow full home ADLs including playing/lifting grandchildren-GOAL MET  2. 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 Able to sleep on L side with no pain -GOAL MET  4. 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 DASH score is improved from 31/55 to 16/5552-[id number] SCORE IS 17/55    Outcome Measure: Tool Used: Disabilities of the Arm, Shoulder and Hand (DASH) Questionnaire - Quick Version  Score:  Initial: 31/55  Most Recent: 17/55 (Date: 7-23-19-- )   Interpretation of Score: The DASH is designed to measure the activities of daily living in person's with upper extremity dysfunction or pain. Each section is scored on a 1-5 scale, 5 representing the greatest disability.   The scores of each section are added together for a total score of 55. Observation/Orthostatic Postural Assessment:          Ambulates independently with less  rounded shoulders and forward head on neck posture   Palpation:        Less tight  L upper trapezius and less tender at teres and anterior deltoid  ROM:        Shoulder range of motion is wfl actively bilaterally  Strength:         4 to 4+/5 at below shoulder level      is 69 on L hand and 60 on right     Special Tests:         Negative cervical distraction/supraspinatus isolation test/drop arm test and negative lift off test     Negative  obriens sign for L labrum issues    Neurological Screen:  No radiating pain or numbness or tingling into lower arm   Mental Status:          Alert and oriented   Sensation:      Intact to light touch        Medical Necessity:   · DC to home exercise program  Reason for Services/Other Comments:  · DC to home program     Rehabilitation Potential For Stated Goals: Good  Regarding I AM AT, I certify that the treatment plan above will be carried out by a therapist or under their direction.   Thank you for this referral,  Ti Reyes PT     Referring Physician Signature: Jose Bedolla MD              Date                     PAIN/SUBJECTIVE:                                                                 1. 1. 1.

## 2019-08-12 ENCOUNTER — HOSPITAL ENCOUNTER (OUTPATIENT)
Dept: LAB | Age: 64
Discharge: HOME | End: 2019-08-12
Attending: INTERNAL MEDICINE
Payer: COMMERCIAL

## 2019-08-12 DIAGNOSIS — I10 ESSENTIAL HYPERTENSION WITH GOAL BLOOD PRESSURE LESS THAN 130/85: ICD-10-CM

## 2019-08-12 DIAGNOSIS — Z82.49 FAMILY HISTORY OF CORONARY ARTERIOSCLEROSIS: ICD-10-CM

## 2019-08-12 DIAGNOSIS — I47.9 PAROXYSMAL TACHYCARDIA (HCC): ICD-10-CM

## 2019-08-12 LAB
ANION GAP SERPL CALC-SCNC: 11 MMOL/L (ref 7–16)
BUN SERPL-MCNC: 13 MG/DL (ref 8–23)
CALCIUM SERPL-MCNC: 8.8 MG/DL (ref 8.3–10.4)
CHLORIDE SERPL-SCNC: 106 MMOL/L (ref 98–107)
CHOLEST SERPL-MCNC: 268 MG/DL
CO2 SERPL-SCNC: 24 MMOL/L (ref 21–32)
CREAT SERPL-MCNC: 1 MG/DL (ref 0.8–1.5)
EST. AVERAGE GLUCOSE BLD GHB EST-MCNC: 203 MG/DL
GLUCOSE SERPL-MCNC: 174 MG/DL (ref 65–100)
HBA1C MFR BLD: 8.7 % (ref 4.8–6)
HDLC SERPL-MCNC: 40 MG/DL (ref 40–60)
HDLC SERPL: 6.7 {RATIO}
LDLC SERPL CALC-MCNC: 181.4 MG/DL
LIPID PROFILE,FLP: ABNORMAL
MAGNESIUM SERPL-MCNC: 1.8 MG/DL (ref 1.8–2.4)
POTASSIUM SERPL-SCNC: 4.1 MMOL/L (ref 3.5–5.1)
SODIUM SERPL-SCNC: 141 MMOL/L (ref 136–145)
TRIGL SERPL-MCNC: 233 MG/DL (ref 35–150)
TSH SERPL DL<=0.005 MIU/L-ACNC: 0.93 UIU/ML (ref 0.36–3.74)
VLDLC SERPL CALC-MCNC: 46.6 MG/DL (ref 6–23)

## 2019-08-12 PROCEDURE — 80061 LIPID PANEL: CPT

## 2019-08-12 PROCEDURE — 84443 ASSAY THYROID STIM HORMONE: CPT

## 2019-08-12 PROCEDURE — 83036 HEMOGLOBIN GLYCOSYLATED A1C: CPT

## 2019-08-12 PROCEDURE — 80048 BASIC METABOLIC PNL TOTAL CA: CPT

## 2019-08-12 PROCEDURE — 36415 COLL VENOUS BLD VENIPUNCTURE: CPT

## 2019-08-12 PROCEDURE — 83735 ASSAY OF MAGNESIUM: CPT

## 2019-08-15 PROBLEM — E11.21 CONTROLLED TYPE 2 DIABETES MELLITUS WITH DIABETIC NEPHROPATHY, WITHOUT LONG-TERM CURRENT USE OF INSULIN (HCC): Status: ACTIVE | Noted: 2019-08-15

## 2019-11-26 ENCOUNTER — ANESTHESIA EVENT (OUTPATIENT)
Dept: SURGERY | Age: 64
End: 2019-11-26
Payer: COMMERCIAL

## 2019-11-27 ENCOUNTER — HOSPITAL ENCOUNTER (OUTPATIENT)
Age: 64
Setting detail: OUTPATIENT SURGERY
Discharge: HOME | End: 2019-11-27
Attending: ORTHOPAEDIC SURGERY | Admitting: ORTHOPAEDIC SURGERY
Payer: COMMERCIAL

## 2019-11-27 ENCOUNTER — ANESTHESIA (OUTPATIENT)
Dept: SURGERY | Age: 64
End: 2019-11-27
Payer: COMMERCIAL

## 2019-11-27 VITALS
BODY MASS INDEX: 27.29 KG/M2 | RESPIRATION RATE: 16 BRPM | HEART RATE: 78 BPM | OXYGEN SATURATION: 93 % | TEMPERATURE: 97.5 F | WEIGHT: 164 LBS | DIASTOLIC BLOOD PRESSURE: 83 MMHG | SYSTOLIC BLOOD PRESSURE: 165 MMHG

## 2019-11-27 LAB — GLUCOSE BLD STRIP.AUTO-MCNC: 212 MG/DL (ref 65–100)

## 2019-11-27 PROCEDURE — 76010000161 HC OR TIME 1 TO 1.5 HR INTENSV-TIER 1: Performed by: ORTHOPAEDIC SURGERY

## 2019-11-27 PROCEDURE — 77030004453 HC BUR SHV STRY -B: Performed by: ORTHOPAEDIC SURGERY

## 2019-11-27 PROCEDURE — 77030018673: Performed by: ORTHOPAEDIC SURGERY

## 2019-11-27 PROCEDURE — 77030033005 HC TBNG ARTHSC PMP STRY -B: Performed by: ORTHOPAEDIC SURGERY

## 2019-11-27 PROCEDURE — 76010010054 HC POST OP PAIN BLOCK: Performed by: ORTHOPAEDIC SURGERY

## 2019-11-27 PROCEDURE — C1713 ANCHOR/SCREW BN/BN,TIS/BN: HCPCS | Performed by: ORTHOPAEDIC SURGERY

## 2019-11-27 PROCEDURE — 77030027384 HC PRB ARTHSCP SERFAS STRY -C: Performed by: ORTHOPAEDIC SURGERY

## 2019-11-27 PROCEDURE — 76210000021 HC REC RM PH II 0.5 TO 1 HR: Performed by: ORTHOPAEDIC SURGERY

## 2019-11-27 PROCEDURE — 74011250636 HC RX REV CODE- 250/636: Performed by: ANESTHESIOLOGY

## 2019-11-27 PROCEDURE — 77030002933 HC SUT MCRYL J&J -A: Performed by: ORTHOPAEDIC SURGERY

## 2019-11-27 PROCEDURE — 77030010509 HC AIRWY LMA MSK TELE -A: Performed by: REGISTERED NURSE

## 2019-11-27 PROCEDURE — 74011000250 HC RX REV CODE- 250: Performed by: NURSE ANESTHETIST, CERTIFIED REGISTERED

## 2019-11-27 PROCEDURE — 77030006668 HC BLD SHV MENSCS STRY -B: Performed by: ORTHOPAEDIC SURGERY

## 2019-11-27 PROCEDURE — 77030040361 HC SLV COMPR DVT MDII -B: Performed by: ORTHOPAEDIC SURGERY

## 2019-11-27 PROCEDURE — 77030010427: Performed by: ORTHOPAEDIC SURGERY

## 2019-11-27 PROCEDURE — 77030010430: Performed by: ORTHOPAEDIC SURGERY

## 2019-11-27 PROCEDURE — 74011250636 HC RX REV CODE- 250/636: Performed by: NURSE ANESTHETIST, CERTIFIED REGISTERED

## 2019-11-27 PROCEDURE — 77030002960 HC SUT PASS S&N -C: Performed by: ORTHOPAEDIC SURGERY

## 2019-11-27 PROCEDURE — 76942 ECHO GUIDE FOR BIOPSY: CPT | Performed by: ORTHOPAEDIC SURGERY

## 2019-11-27 PROCEDURE — 76210000006 HC OR PH I REC 0.5 TO 1 HR: Performed by: ORTHOPAEDIC SURGERY

## 2019-11-27 PROCEDURE — 77030019605: Performed by: ORTHOPAEDIC SURGERY

## 2019-11-27 PROCEDURE — 77030018836 HC SOL IRR NACL ICUM -A: Performed by: ORTHOPAEDIC SURGERY

## 2019-11-27 PROCEDURE — 82962 GLUCOSE BLOOD TEST: CPT

## 2019-11-27 PROCEDURE — 77030003666 HC NDL SPINAL BD -A: Performed by: ORTHOPAEDIC SURGERY

## 2019-11-27 PROCEDURE — 74011250636 HC RX REV CODE- 250/636: Performed by: ORTHOPAEDIC SURGERY

## 2019-11-27 PROCEDURE — 74011250636 HC RX REV CODE- 250/636: Performed by: PHYSICIAN ASSISTANT

## 2019-11-27 PROCEDURE — 77030003602 HC NDL NRV BLK BBMI -B: Performed by: REGISTERED NURSE

## 2019-11-27 PROCEDURE — 77030036560 HC SHLDR ARM PAD ABDUCTN S2SG -B: Performed by: ORTHOPAEDIC SURGERY

## 2019-11-27 PROCEDURE — 76060000033 HC ANESTHESIA 1 TO 1.5 HR: Performed by: ORTHOPAEDIC SURGERY

## 2019-11-27 DEVICE — MULTIFIX S-ULTRA 5.5MM KNOTLESS ANCHOR
Type: IMPLANTABLE DEVICE | Site: SHOULDER | Status: FUNCTIONAL
Brand: MULTIFIX

## 2019-11-27 RX ORDER — OXYCODONE HYDROCHLORIDE 5 MG/1
10 TABLET ORAL
Status: DISCONTINUED | OUTPATIENT
Start: 2019-11-27 | End: 2019-11-27 | Stop reason: HOSPADM

## 2019-11-27 RX ORDER — EPHEDRINE SULFATE/0.9% NACL/PF 50 MG/5 ML
SYRINGE (ML) INTRAVENOUS AS NEEDED
Status: DISCONTINUED | OUTPATIENT
Start: 2019-11-27 | End: 2019-11-27 | Stop reason: HOSPADM

## 2019-11-27 RX ORDER — FLUMAZENIL 0.1 MG/ML
0.2 INJECTION INTRAVENOUS
Status: DISCONTINUED | OUTPATIENT
Start: 2019-11-27 | End: 2019-11-27 | Stop reason: HOSPADM

## 2019-11-27 RX ORDER — SODIUM CHLORIDE 0.9 % (FLUSH) 0.9 %
5-40 SYRINGE (ML) INJECTION AS NEEDED
Status: DISCONTINUED | OUTPATIENT
Start: 2019-11-27 | End: 2019-11-27 | Stop reason: HOSPADM

## 2019-11-27 RX ORDER — DIPHENHYDRAMINE HYDROCHLORIDE 50 MG/ML
12.5 INJECTION, SOLUTION INTRAMUSCULAR; INTRAVENOUS
Status: DISCONTINUED | OUTPATIENT
Start: 2019-11-27 | End: 2019-11-27 | Stop reason: HOSPADM

## 2019-11-27 RX ORDER — LIDOCAINE HYDROCHLORIDE 10 MG/ML
0.1 INJECTION INFILTRATION; PERINEURAL AS NEEDED
Status: DISCONTINUED | OUTPATIENT
Start: 2019-11-27 | End: 2019-11-27 | Stop reason: HOSPADM

## 2019-11-27 RX ORDER — CEFAZOLIN SODIUM/WATER 2 G/20 ML
2 SYRINGE (ML) INTRAVENOUS ONCE
Status: COMPLETED | OUTPATIENT
Start: 2019-11-27 | End: 2019-11-27

## 2019-11-27 RX ORDER — EPINEPHRINE 1 MG/ML
INJECTION INTRAMUSCULAR; INTRAVENOUS; SUBCUTANEOUS AS NEEDED
Status: DISCONTINUED | OUTPATIENT
Start: 2019-11-27 | End: 2019-11-27 | Stop reason: HOSPADM

## 2019-11-27 RX ORDER — DEXAMETHASONE SODIUM PHOSPHATE 4 MG/ML
INJECTION, SOLUTION INTRA-ARTICULAR; INTRALESIONAL; INTRAMUSCULAR; INTRAVENOUS; SOFT TISSUE AS NEEDED
Status: DISCONTINUED | OUTPATIENT
Start: 2019-11-27 | End: 2019-11-27 | Stop reason: HOSPADM

## 2019-11-27 RX ORDER — SODIUM CHLORIDE 0.9 % (FLUSH) 0.9 %
5-40 SYRINGE (ML) INJECTION EVERY 8 HOURS
Status: DISCONTINUED | OUTPATIENT
Start: 2019-11-27 | End: 2019-11-27 | Stop reason: HOSPADM

## 2019-11-27 RX ORDER — ONDANSETRON 2 MG/ML
INJECTION INTRAMUSCULAR; INTRAVENOUS AS NEEDED
Status: DISCONTINUED | OUTPATIENT
Start: 2019-11-27 | End: 2019-11-27 | Stop reason: HOSPADM

## 2019-11-27 RX ORDER — LIDOCAINE HYDROCHLORIDE 20 MG/ML
INJECTION, SOLUTION EPIDURAL; INFILTRATION; INTRACAUDAL; PERINEURAL AS NEEDED
Status: DISCONTINUED | OUTPATIENT
Start: 2019-11-27 | End: 2019-11-27 | Stop reason: HOSPADM

## 2019-11-27 RX ORDER — MIDAZOLAM HYDROCHLORIDE 1 MG/ML
2 INJECTION, SOLUTION INTRAMUSCULAR; INTRAVENOUS
Status: DISCONTINUED | OUTPATIENT
Start: 2019-11-27 | End: 2019-11-27 | Stop reason: HOSPADM

## 2019-11-27 RX ORDER — ACETAMINOPHEN 500 MG
1000 TABLET ORAL ONCE
Status: DISCONTINUED | OUTPATIENT
Start: 2019-11-27 | End: 2019-11-27 | Stop reason: HOSPADM

## 2019-11-27 RX ORDER — PROPOFOL 10 MG/ML
INJECTION, EMULSION INTRAVENOUS AS NEEDED
Status: DISCONTINUED | OUTPATIENT
Start: 2019-11-27 | End: 2019-11-27 | Stop reason: HOSPADM

## 2019-11-27 RX ORDER — NALOXONE HYDROCHLORIDE 0.4 MG/ML
0.2 INJECTION, SOLUTION INTRAMUSCULAR; INTRAVENOUS; SUBCUTANEOUS AS NEEDED
Status: DISCONTINUED | OUTPATIENT
Start: 2019-11-27 | End: 2019-11-27 | Stop reason: HOSPADM

## 2019-11-27 RX ORDER — OXYCODONE HYDROCHLORIDE 5 MG/1
5 TABLET ORAL
Status: DISCONTINUED | OUTPATIENT
Start: 2019-11-27 | End: 2019-11-27 | Stop reason: HOSPADM

## 2019-11-27 RX ORDER — SODIUM CHLORIDE, SODIUM LACTATE, POTASSIUM CHLORIDE, CALCIUM CHLORIDE 600; 310; 30; 20 MG/100ML; MG/100ML; MG/100ML; MG/100ML
100 INJECTION, SOLUTION INTRAVENOUS CONTINUOUS
Status: DISCONTINUED | OUTPATIENT
Start: 2019-11-27 | End: 2019-11-27 | Stop reason: HOSPADM

## 2019-11-27 RX ORDER — MIDAZOLAM HYDROCHLORIDE 1 MG/ML
2 INJECTION, SOLUTION INTRAMUSCULAR; INTRAVENOUS ONCE
Status: COMPLETED | OUTPATIENT
Start: 2019-11-27 | End: 2019-11-27

## 2019-11-27 RX ORDER — FENTANYL CITRATE 50 UG/ML
100 INJECTION, SOLUTION INTRAMUSCULAR; INTRAVENOUS ONCE
Status: COMPLETED | OUTPATIENT
Start: 2019-11-27 | End: 2019-11-27

## 2019-11-27 RX ORDER — HYDROMORPHONE HYDROCHLORIDE 2 MG/ML
0.5 INJECTION, SOLUTION INTRAMUSCULAR; INTRAVENOUS; SUBCUTANEOUS
Status: DISCONTINUED | OUTPATIENT
Start: 2019-11-27 | End: 2019-11-27 | Stop reason: HOSPADM

## 2019-11-27 RX ADMIN — Medication 10 MG: at 12:03

## 2019-11-27 RX ADMIN — PROPOFOL 50 MG: 10 INJECTION, EMULSION INTRAVENOUS at 12:11

## 2019-11-27 RX ADMIN — FENTANYL CITRATE 100 MCG: 50 INJECTION, SOLUTION INTRAMUSCULAR; INTRAVENOUS at 10:00

## 2019-11-27 RX ADMIN — SODIUM CHLORIDE, SODIUM LACTATE, POTASSIUM CHLORIDE, AND CALCIUM CHLORIDE: 600; 310; 30; 20 INJECTION, SOLUTION INTRAVENOUS at 11:29

## 2019-11-27 RX ADMIN — MIDAZOLAM 2 MG: 1 INJECTION INTRAMUSCULAR; INTRAVENOUS at 10:00

## 2019-11-27 RX ADMIN — DEXAMETHASONE SODIUM PHOSPHATE 4 MG: 4 INJECTION, SOLUTION INTRAMUSCULAR; INTRAVENOUS at 12:32

## 2019-11-27 RX ADMIN — ONDANSETRON 4 MG: 2 INJECTION INTRAMUSCULAR; INTRAVENOUS at 12:32

## 2019-11-27 RX ADMIN — SODIUM CHLORIDE, SODIUM LACTATE, POTASSIUM CHLORIDE, AND CALCIUM CHLORIDE 100 ML/HR: 600; 310; 30; 20 INJECTION, SOLUTION INTRAVENOUS at 10:07

## 2019-11-27 RX ADMIN — SODIUM CHLORIDE, SODIUM LACTATE, POTASSIUM CHLORIDE, AND CALCIUM CHLORIDE: 600; 310; 30; 20 INJECTION, SOLUTION INTRAVENOUS at 12:41

## 2019-11-27 RX ADMIN — PROPOFOL 200 MG: 10 INJECTION, EMULSION INTRAVENOUS at 11:37

## 2019-11-27 RX ADMIN — LIDOCAINE HYDROCHLORIDE 60 MG: 20 INJECTION, SOLUTION EPIDURAL; INFILTRATION; INTRACAUDAL; PERINEURAL at 11:37

## 2019-11-27 RX ADMIN — Medication 2 G: at 11:43

## 2019-11-27 NOTE — OP NOTES
[facility]  OPERATIVE REPORT    Name:  Radha Gorman  MR#:  [id number]  :  1955  ACCOUNT #:  [de-identified]  DATE OF SERVICE:  2019    PREOPERATIVE DIAGNOSES:  1. Rotator cuff tear. 2.  Acromioclavicular arthritis. 3.  Posterior and superior labral tearing. 4.  Chondromalacia of glenoid. 5.  Impingement, left shoulder. POSTOPERATIVE DIAGNOSES:  1. Rotator cuff tear. 2.  Acromioclavicular arthritis. 3.  Posterior and superior labral tearing. 4.  Chondromalacia of glenoid. 5.  Impingement, left shoulder. PROCEDURE PERFORMED:  1. Arthroscopic rotator cuff repair - CPT 37168.  2.  Arthroscopic distal clavicle excision (Jose Angel procedure) - CPT 41749.  3.  Debridement of labral tearing - extensive debridement - CPT 29692.  4.  Chondroplasty of glenoid - extensive debridement - CPT 92892.  5.  Arthroscopic subacromial decompression - CPT 38457 - left shoulder. SURGEON:  Frank Greer MD    ASSISTANT:  ELIE Trejo    ANESTHESIA:  General.    FLUIDS:  Crystalloid. COMPLICATIONS:  None. SPECIMENS REMOVED:  None. IMPLANTS:  Yes, see record. ESTIMATED BLOOD LOSS:  Minimal.    FINDINGS:  There was a high-grade partial tear of the supraspinatus encompassing greater than 50% of the thickness of the tendon in an 8-9-mm area. The biceps was intact. There was some grade II, III chondromalacia of the posterior glenoid, 1 x 1.5 cm. There was posterior and  superior labral tearing. There was an anterior-inferior acromial slope. There was undersurface osteophyte formation and degenerative change of the distal clavicle at the [facility] joint. DESCRIPTION OF PROCEDURE:  After informed consent, the patient was brought to the operating room and placed on the operating table in the supine position. General anesthesia was administered without difficulty. The patient was placed in the lateral decubitus position.   All pressure points were inspected and padded appropriately. The left upper extremity was suspended by traction. Left shoulder was prepped and draped in sterile fashion. Glenohumeral joint was insufflated with 50 mL of sterile saline and a posterior portal was established. Glenohumeral joint was then arthroscoped in a sequential manner. The aforementioned findings were noted. An anterior portal was established. Labral tearing was debrided smooth. All loose flaps of tissue were removed. There were no sharp edges or unstable fragments after the labral debridement. A chondroplasty of the glenoid was performed. All loose cartilage flaps were removed. There were no sharp edges or unstable fragments after the chondroplasty. Undersurface rotator cuff tearing was debrided back to smooth stable area. Scope was brought into the subacromial space. A lateral portal was established. Bursal proliferative tissue was excised. The undersurface of the acromion was exposed and an acromioplasty was performed. All of the acromion anterior to the leading edge of the distal clavicle was excised. A depth of 5-6 mm in a 2-cm anterior to posterior direction was removed. This opened up the subacromial space nicely. Attention was then directed to the distal clavicle. Through both the anterior and lateral portals, a circumferential distal clavicle excision was performed. A 10 mm of distal clavicle was excised. Circumferential excision was verified arthroscopically. The Jose Angel procedure was performed without difficulty. Attention was directed to the rotator cuff. The high-grade partial tear was converted to a full-thickness tear. The area underneath the original insertion was lightly decorticated. A MultiFix anchor and two ULTRATAPE sutures in a mattress fashion were used to complete the repair of the tear. Anatomic rotator cuff repair was performed. The rotator cuff tear was repaired without difficulty. The shoulder was thoroughly irrigated.   Portals were closed. Sterile dressings were applied. The patient was extubated and taken to recovery room in stable condition. ELIE Kilpatrick, assisted during the procedure. He was necessary for patient positioning, wound closure, and assistance with the major portions of the operation including the rotator cuff repair. His presence decreased the operative time and potential complication rate.       Ruthie March MD      [id number]  D:  11/27/2019 12:35  T:  11/27/2019 13:11  JOB #:  [id number]

## 2019-11-27 NOTE — ANESTHESIA POSTPROCEDURE EVALUATION
Procedure(s):  LEFT SHOULDER ARTHROSCOPY ROTATOR CUFF REPAIR/DISTAL CLAVICLE EXCISION/ DECOMPRESSION. general    Anesthesia Post Evaluation      Multimodal analgesia: multimodal analgesia used between 6 hours prior to anesthesia start to PACU discharge  Patient location during evaluation: PACU  Patient participation: complete - patient participated  Level of consciousness: awake and alert  Pain management: adequate  Airway patency: patent  Anesthetic complications: no  Cardiovascular status: acceptable and hemodynamically stable  Respiratory status: acceptable  Hydration status: acceptable        Vitals Value Taken Time   /83 11/27/2019  1:20 PM   Temp 36.4 °C (97.5 °F) 11/27/2019  1:15 PM   Pulse 80 11/27/2019  1:22 PM   Resp 16 11/27/2019  1:20 PM   SpO2 93 % 11/27/2019  1:22 PM   Vitals shown include unvalidated device data.

## 2019-11-27 NOTE — H&P
Outpatient Surgery History and Physical:  Boris Calderon was seen and examined. CHIEF COMPLAINT:   Left shoulder pain. PE:     Visit Vitals  /76 (BP 1 Location: Right arm, BP Patient Position: Supine)   Pulse 84   Temp 97.8 °F (36.6 °C)   Resp 18   Wt 74.4 kg (164 lb)   SpO2 95%   BMI 27.29 kg/m²       Heart:   Regular rhythm      Lungs:  Are clear      Past Medical History:    Patient Active Problem List    Diagnosis    Controlled type 2 diabetes mellitus with diabetic nephropathy, without long-term current use of insulin (HCC)    OA (osteoarthritis) of hip    Mixed hyperlipidemia    PAC (premature atrial contraction)    Palpitations    Encounter to establish care    Family history of coronary arteriosclerosis    Essential hypertension with goal blood pressure less than 130/85    Paroxysmal tachycardia ([address]       Surgical History: No past surgical history on file. Social History: Patient  reports that he has never smoked. He has never used smokeless tobacco. He reports current alcohol use. He reports that he does not use drugs. Family History: No family history on file. Allergies: Reviewed per EMR  Allergies   Allergen Reactions    Other Plant, Animal, Environmental Anaphylaxis     SOB         Medications:    No current facility-administered medications on file prior to encounter. Current Outpatient Medications on File Prior to Encounter   Medication Sig    b complex vitamins (B COMPLEX 1) tablet Take 1 Tab by mouth daily.  amLODIPine-benazepril (LOTREL) 2.5-10 mg per capsule Take 1 Cap by mouth daily.  calcium/vitamin D2/zinc/chrom (CALCIUM,VIT D,CHROMIUM,ZINC PO) 2 Tabs by Mouth/Throat route daily.  acetaminophen (TYLENOL) 500 mg tablet Take 2 Tabs by mouth every six (6) hours.  MULTIVITAMIN PO Take  by mouth daily.  senna-docusate (PERICOLACE) 8.6-50 mg per tablet Take 2 Tabs by mouth daily.        The surgery is planned for the left shoulder arthroscopy possible rotator cuff repair. History and physical has been reviewed. The patient has been examined. There have been no significant clinical changes since the completion of the originally dated History and Physical.  Patient identified by surgeon; surgical site was confirmed by patient and surgeon. The patient is here today for outpatient surgery. I have examined the patient, no changes are noted in the patient's medical status. Necessity for the procedure/care is still present and the history and physical above is current. See the office notes for the full long term history of the problem. Please see the recent office notes for the musculoskeletal examination.     Signed By: Dock Spatz, PA     November 27, 2019 9:21 AM

## 2019-11-27 NOTE — DISCHARGE INSTRUCTIONS
Post-Operative Instructions   For  Shoulder Arthroscopy Rotator Cuff Repair  Phone:  [phone]    1. If you do not have an \"Iceman\" type cooling unit, for the first 48-72 hours following surgery, use ice on the shoulder every two hours (while awake) for 20-30 minutes at a time to help prevent swelling and lessen pain. If you have a cooling unit, follow the instructions given to you- continually as much as possible the first 48-72 hours, then 3-4 times a day for 4 weeks. 2. You may shower after the arthroscopy. Keep dressings in place for the first three days. After three days, you may remove the dressings and leave the steri-strip bandages in place; they will peel off naturally. 3. Stay in sling at all times except to shower. 4. Begin therapy as ordered. 5. Use any pain medication as instructed. You should take your pain medication as soon as you feel the anesthetic wearing off. Do not wait until you are in severe pain to begin taking your pain medication. 6. You may have some side effects from your pain medication. If you have nausea, try taking your medication with food. For itching, you may take over the counter Benadryl. 7. You may have been given a prescription for Zofran or Phenergan. This medication is used for nausea and vomiting. You do not need to get this prescription filled unless you have a problem. 8. If you have a problem, please call [address] [place] [address] at [phone]    [address] [place] [address], P.A[facility] ACTIVITY  · As tolerated and as directed by your doctor. · Bathe or shower as directed by your doctor. DIET  · Clear liquids until no nausea or vomiting; then light diet for the first day. · Advance to regular diet on second day, unless your doctor orders otherwise. · If nausea and vomiting continues, call your doctor. PAIN  · Take pain medication as directed by your doctor.    · Call your doctor if pain is NOT relieved by medication. · DO NOT take aspirin of blood thinners unless directed by your doctor. CALL YOUR DOCTOR IF   · Excessive bleeding that does not stop after holding pressure over the area  · Temperature of 101 degrees F or above  · Excessive redness, swelling or bruising, and/ or green or yellow, smelly discharge from incision    AFTER ANESTHESIA   · For the first 24 hours: DO NOT Drive, Drink alcoholic beverages, or Make important decisions. · Be aware of dizziness following anesthesia and while taking pain medication. DISCHARGE SUMMARY from Nurse    PATIENT INSTRUCTIONS:    After general anesthesia or intravenous sedation, for 24 hours or while taking prescription Narcotics:  · Limit your activities  · Do not drive and operate hazardous machinery  · Do not make important personal or business decisions  · Do  not drink alcoholic beverages  · If you have not urinated within 8 hours after discharge, please contact your surgeon on call. *  Please give a list of your current medications to your Primary Care Provider. *  Please update this list whenever your medications are discontinued, doses are      changed, or new medications (including over-the-counter products) are added. *  Please carry medication information at all times in case of emergency situations. These are general instructions for a healthy lifestyle:    No smoking/ No tobacco products/ Avoid exposure to second hand smoke    Surgeon General's Warning:  Quitting smoking now greatly reduces serious risk to your health.     Obesity, smoking, and sedentary lifestyle greatly increases your risk for illness    A healthy diet, regular physical exercise & weight monitoring are important for maintaining a healthy lifestyle    You may be retaining fluid if you have a history of heart failure or if you experience any of the following symptoms:  Weight gain of 3 pounds or more overnight or 5 pounds in a week, increased swelling in our hands or feet or shortness of breath while lying flat in bed. Please call your doctor as soon as you notice any of these symptoms; do not wait until your next office visit. Recognize signs and symptoms of STROKE:    F-face looks uneven    A-arms unable to move or move unevenly    S-speech slurred or non-existent    T-time-call 911 as soon as signs and symptoms begin-DO NOT go       Back to bed or wait to see if you get better-TIME IS BRAIN. Oxycodone, Rapid Release (By mouth)   Oxycodone Hydrochloride (lf-q-YVR-done elise-droe-KLOR-dede)  Treats moderate to severe pain. This medicine is a narcotic pain reliever. Brand Name(s): Oxaydo, Oxy IR, Roxicodone   There may be other brand names for this medicine. When This Medicine Should Not Be Used: This medicine is not right for everyone. Do not use it if you had an allergic reaction to oxycodone, codeine, hydrocodone, dihydrocodeine, or morphine, or you have a stomach or bowel blockage. How to Use This Medicine:   Capsule, Liquid, Tablet  · Take your medicine as directed. Your dose may need to be changed several times to find what works best for you. · An overdose can be dangerous. Follow directions carefully so you do not get too much medicine at one time. · Oral liquid: Measure the oral liquid medicine with a marked measuring spoon, oral syringe, or medicine cup. · Oxaydo® tablet: Swallow it whole with enough water to swallow it completely. Do not break, crush, chew, or dissolve it. Do not wet the tablet before you put it in your mouth. · This medicine should come with a Medication Guide. Ask your pharmacist for a copy if you do not have one. · Missed dose: Take a dose as soon as you remember. If it is almost time for your next dose, wait until then and take a regular dose. Do not take extra medicine to make up for a missed dose. · Store the medicine in a closed container at room temperature, away from heat, moisture, and direct light.  Store the medicine in a secure place to prevent others from getting it. Ask your pharmacist about the best way to dispose of medicine you do not use. Drugs and Foods to Avoid:   Ask your doctor or pharmacist before using any other medicine, including over-the-counter medicines, vitamins, and herbal products. · Do not use this medicine if you are using or have used an MAO inhibitor within the past 14 days. · Some medicines can affect how oxycodone works. Tell your doctor if you are using any of the following:  ¨ Amiodarone, carbamazepine, erythromycin, ketoconazole, phenytoin, quinidine, rifampin, ritonavir  ¨ Diuretic (water pill)  ¨ Medicine to treat depression or anxiety  ¨ Medicine to treat migraine headaches  ¨ Phenothiazine medicine  · Tell your doctor if you use anything else that makes you sleepy. Some examples are allergy medicine, narcotic pain medicine, and alcohol. Tell your doctor if you are using buprenorphine, butorphanol, nalbuphine, pentazocine, or a muscle relaxer. · Do not drink alcohol while you are using this medicine. Warnings While Using This Medicine:   · Tell your doctor if you are pregnant or breastfeeding, or if you have kidney disease, liver disease, heart disease, low blood pressure, lung disease or breathing problems (such as asthma, COPD), scoliosis, an enlarged prostate or trouble urinating, an underactive thyroid, Guymon disease, gallbladder or pancreas problems, or digestion problems. Tell your doctor if you have a history of head injury, brain tumor, mental health problems, seizures, or alcohol or drug addiction. · This medicine may cause the following problems:  ¨ High risk of overdose, which can lead to death  ¨ Respiratory depression (serious breathing problem that can be life-threatening)  ¨ Serotonin syndrome, when used with certain medicines  · This medicine may make you dizzy, drowsy, or faint.  Do not drive or do anything else that could be dangerous until you know how this medicine affects you. Sit or lie down if you feel dizzy. Stand up carefully. · This medicine can be habit-forming. Do not use more than your prescribed dose. Call your doctor if you think your medicine is not working. · Do not stop using this medicine suddenly. Your doctor will need to slowly decrease your dose before you stop it completely. · This medicine may cause constipation, especially with long-term use. Ask your doctor if you should use a laxative to prevent and treat constipation. Drink plenty of liquids to help avoid constipation. · This medicine could cause infertility. Talk with your doctor before using this medicine if you plan to have children. · Keep all medicine out of the reach of children. Never share your medicine with anyone. Possible Side Effects While Using This Medicine:   Call your doctor right away if you notice any of these side effects:  · Allergic reaction: Itching or hives, swelling in your face or hands, swelling or tingling in your mouth or throat, chest tightness, trouble breathing  · Anxiety, restlessness, fast heartbeat, fever, sweating, muscle spasms, twitching, nausea, vomiting, diarrhea, seeing or hearing things that are not there  · Blue lips, fingernails, or skin, trouble breathing  · Extreme dizziness or weakness, shallow breathing, slow heartbeat, sweating, cold or clammy skin, seizures  · Lightheadedness, dizziness, fainting  · Severe constipation, stomach pain  If you notice these less serious side effects, talk with your doctor:   · Mild constipation  · Sleepiness, tiredness  If you notice other side effects that you think are caused by this medicine, tell your doctor. Call your doctor for medical advice about side effects. You may report side effects to FDA at 1-764-FDA-5198  © 2017 [facility] Information is for End User's use only and may not be sold, redistributed or otherwise used for commercial purposes.   The above information is an  only. It is not intended as medical advice for individual conditions or treatments. Talk to your doctor, nurse or pharmacist before following any medical regimen to see if it is safe and effective for you.

## 2019-11-27 NOTE — ANESTHESIA PROCEDURE NOTES
Peripheral Block    Start time: 11/27/2019 10:00 AM  End time: 11/27/2019 10:05 AM  Performed by: Robert Molina MD  Authorized by: Robert Molina MD       Pre-procedure: Indications: at surgeon's request and post-op pain management    Preanesthetic Checklist: patient identified, risks and benefits discussed, site marked, timeout performed, anesthesia consent given and patient being monitored      Block Type:   Block Type:   Interscalene  Laterality:  Left  Monitoring:  Continuous pulse ox, frequent vital sign checks, heart rate, responsive to questions and oxygen  Injection Technique:  Single shot  Procedures: ultrasound guided    Prep: chlorhexidine    Location:  Interscalene  Needle Type:  Stimuplex  Needle Gauge:  22 G  Needle Localization:  Anatomical landmarks, infiltration and ultrasound guidance    Assessment:  Number of attempts:  1  Injection Assessment:  Incremental injection every 5 mL, negative aspiration for CSF, local visualized surrounding nerve on ultrasound, negative aspiration for blood, no intravascular symptoms, no paresthesia and ultrasound image on chart  Patient tolerance:  Patient tolerated the procedure well with no immediate complications

## 2019-11-27 NOTE — ANESTHESIA PREPROCEDURE EVALUATION
Relevant Problems   No relevant active problems       Anesthetic History   No history of anesthetic complications            Review of Systems / Medical History  Patient summary reviewed and pertinent labs reviewed    Pulmonary  Within defined limits                 Neuro/Psych   Within defined limits           Cardiovascular    Hypertension        Dysrhythmias : PVC      Exercise tolerance: >4 METS     GI/Hepatic/Renal     GERD           Endo/Other    Diabetes (A1c 7.7): type 2    Arthritis     Other Findings            Physical Exam    Airway  Mallampati: III  TM Distance: 4 - 6 cm  Neck ROM: normal range of motion   Mouth opening: Diminished (comment)     Cardiovascular    Rhythm: regular  Rate: normal         Dental         Pulmonary  Breath sounds clear to auscultation               Abdominal         Other Findings            Anesthetic Plan    ASA: 2  Anesthesia type: general      Post-op pain plan if not by surgeon: peripheral nerve block single    Induction: Intravenous  Anesthetic plan and risks discussed with: Patient and Son / Daughter      Spoke with patient via  phone.

## 2019-11-27 NOTE — PROGRESS NOTES
Dr. Kaleigh Jeter and I spoke with patient via phone , Vy Gupta. Consents signed via phone . Daughter in law in room with patient also helped interpret.

## 2019-12-10 NOTE — BRIEF OP NOTE
BRIEF OPERATIVE NOTE    Date of Procedure: 11/27/2019   Preoperative Diagnosis: Left shoulder pain, unspecified chronicity [M25.512]  Postoperative Diagnosis: LEFT SHOULDER -Rotator Cuff Tear, ACR arthritis    Procedure(s):  LEFT SHOULDER ARTHROSCOPY ROTATOR CUFF REPAIR/DISTAL CLAVICLE EXCISION/ DECOMPRESSION  Surgeon(s) and Role:     * Iban Michael MD - Primary         Surgical Assistant:     Surgical Staff:  Circ-1: Jeremiah Rider RN  Circ-Relief: Brady Danielle RN; Steffanie Bird  Physician Assistant: ELIE De La Torre  Scrub Tech-1: Hong Jennings  Event Time In Time Out   Incision Start 1200    Incision Close 1237      Anesthesia: General   Estimated Blood Loss: min  Specimens: * No specimens in log *   Findings: rtc    Complications: none  Implants:   Implant Name Type Inv.  Item Serial No.  Lot No. LRB No. Used Action   ANCHOR SUT ULTRA PEEK KL 5.5MM -- Marielos Cao - [id number]  ANCHOR SUT ULTRA PEEK KL 5.5MM -- Fausto Luke [employer] [id number] Left 1 Implanted

## 2020-02-25 ENCOUNTER — APPOINTMENT (RX ONLY)
Dept: URBAN - METROPOLITAN AREA CLINIC 24 | Facility: CLINIC | Age: 65
Setting detail: DERMATOLOGY
End: 2020-02-25

## 2020-02-25 DIAGNOSIS — L81.4 OTHER MELANIN HYPERPIGMENTATION: ICD-10-CM

## 2020-02-25 DIAGNOSIS — D22 MELANOCYTIC NEVI: ICD-10-CM

## 2020-02-25 DIAGNOSIS — D18.0 HEMANGIOMA: ICD-10-CM

## 2020-02-25 DIAGNOSIS — L91.8 OTHER HYPERTROPHIC DISORDERS OF THE SKIN: ICD-10-CM

## 2020-02-25 DIAGNOSIS — D36.1 BENIGN NEOPLASM OF PERIPHERAL NERVES AND AUTONOMIC NERVOUS SYSTEM: ICD-10-CM

## 2020-02-25 PROBLEM — D36.11 BENIGN NEOPLASM OF PERIPHERAL NERVES AND AUTONOMIC NERVOUS SYSTEM OF FACE, HEAD, AND NECK: Status: ACTIVE | Noted: 2020-02-25

## 2020-02-25 PROBLEM — D22.5 MELANOCYTIC NEVI OF TRUNK: Status: ACTIVE | Noted: 2020-02-25

## 2020-02-25 PROBLEM — H91.90 UNSPECIFIED HEARING LOSS, UNSPECIFIED EAR: Status: ACTIVE | Noted: 2020-02-25

## 2020-02-25 PROBLEM — E78.5 HYPERLIPIDEMIA, UNSPECIFIED: Status: ACTIVE | Noted: 2020-02-25

## 2020-02-25 PROBLEM — I10 ESSENTIAL (PRIMARY) HYPERTENSION: Status: ACTIVE | Noted: 2020-02-25

## 2020-02-25 PROBLEM — D18.01 HEMANGIOMA OF SKIN AND SUBCUTANEOUS TISSUE: Status: ACTIVE | Noted: 2020-02-25

## 2020-02-25 PROBLEM — E13.9 OTHER SPECIFIED DIABETES MELLITUS WITHOUT COMPLICATIONS: Status: ACTIVE | Noted: 2020-02-25

## 2020-02-25 PROCEDURE — 99202 OFFICE O/P NEW SF 15 MIN: CPT

## 2020-02-25 PROCEDURE — ? COUNSELING

## 2020-02-25 ASSESSMENT — LOCATION DETAILED DESCRIPTION DERM
LOCATION DETAILED: LEFT AXILLARY VAULT
LOCATION DETAILED: RIGHT AXILLARY VAULT
LOCATION DETAILED: LEFT SUPERIOR UPPER BACK
LOCATION DETAILED: RIGHT POSTERIOR SHOULDER
LOCATION DETAILED: EPIGASTRIC SKIN
LOCATION DETAILED: RIGHT MEDIAL INFERIOR CHEST
LOCATION DETAILED: RIGHT MID-UPPER BACK
LOCATION DETAILED: LEFT CHIN

## 2020-02-25 ASSESSMENT — LOCATION SIMPLE DESCRIPTION DERM
LOCATION SIMPLE: CHEST
LOCATION SIMPLE: LEFT AXILLARY VAULT
LOCATION SIMPLE: ABDOMEN
LOCATION SIMPLE: RIGHT AXILLARY VAULT
LOCATION SIMPLE: LEFT UPPER BACK
LOCATION SIMPLE: RIGHT UPPER BACK
LOCATION SIMPLE: CHIN
LOCATION SIMPLE: RIGHT SHOULDER

## 2020-02-25 ASSESSMENT — LOCATION ZONE DERM
LOCATION ZONE: TRUNK
LOCATION ZONE: ARM
LOCATION ZONE: AXILLAE
LOCATION ZONE: FACE

## 2021-04-13 ENCOUNTER — HOSPITAL ENCOUNTER (OUTPATIENT)
Dept: LAB | Age: 66
Discharge: HOME | End: 2021-04-13

## 2021-04-13 PROCEDURE — 88305 TISSUE EXAM BY PATHOLOGIST: CPT

## 2022-03-18 PROBLEM — E11.21 CONTROLLED TYPE 2 DIABETES MELLITUS WITH DIABETIC NEPHROPATHY, WITHOUT LONG-TERM CURRENT USE OF INSULIN (HCC): Status: ACTIVE | Noted: 2019-08-15

## 2022-03-19 PROBLEM — M16.9 OA (OSTEOARTHRITIS) OF HIP: Status: ACTIVE | Noted: 2018-10-17

## 2022-03-19 PROBLEM — E78.2 MIXED HYPERLIPIDEMIA: Status: ACTIVE | Noted: 2018-02-01

## 2022-03-19 PROBLEM — I47.9 PAROXYSMAL TACHYCARDIA (HCC): Status: ACTIVE | Noted: 2018-01-15

## 2022-03-19 PROBLEM — I10 ESSENTIAL HYPERTENSION WITH GOAL BLOOD PRESSURE LESS THAN 130/85: Status: ACTIVE | Noted: 2018-01-15

## 2022-03-19 PROBLEM — R00.2 PALPITATIONS: Status: ACTIVE | Noted: 2018-01-15

## 2022-03-20 PROBLEM — Z76.89 ENCOUNTER TO ESTABLISH CARE: Status: ACTIVE | Noted: 2018-01-15

## 2022-03-20 PROBLEM — I49.1 PAC (PREMATURE ATRIAL CONTRACTION): Status: ACTIVE | Noted: 2018-02-01

## 2022-03-20 PROBLEM — Z82.49 FAMILY HISTORY OF CORONARY ARTERIOSCLEROSIS: Status: ACTIVE | Noted: 2018-01-15

## 2022-09-15 ENCOUNTER — TELEPHONE (OUTPATIENT)
Dept: FAMILY MEDICINE CLINIC | Facility: CLINIC | Age: 67
End: 2022-09-15

## 2022-09-15 NOTE — TELEPHONE ENCOUNTER
----- Message from [facility] sent at 9/13/2022 10:45 AM EDT -----  Subject: Message to Provider    QUESTIONS  Information for Provider? Patient called in to see if his provider can fax   over his medical record history to his new provider because e has moved   out of [place]. Patient would like it faxed to [phone]. Attention [facility]. The address is [place] [address]. The phone number is [phone]. Please contact to further assist.   ---------------------------------------------------------------------------  --------------  Yvonne Counter INFO  [phone]; OK to leave message on voicemail  ---------------------------------------------------------------------------  --------------  SCRIPT ANSWERS  Relationship to Patient?  Self

## 2022-09-16 ENCOUNTER — TELEPHONE (OUTPATIENT)
Dept: FAMILY MEDICINE CLINIC | Facility: CLINIC | Age: 67
End: 2022-09-16

## 2022-09-16 ENCOUNTER — LAB ENCOUNTER (OUTPATIENT)
Dept: LAB | Age: 67
End: 2022-09-16
Attending: FAMILY MEDICINE

## 2022-09-16 ENCOUNTER — OFFICE VISIT (OUTPATIENT)
Dept: FAMILY MEDICINE CLINIC | Facility: CLINIC | Age: 67
End: 2022-09-16
Payer: COMMERCIAL

## 2022-09-16 VITALS
BODY MASS INDEX: 26.83 KG/M2 | DIASTOLIC BLOOD PRESSURE: 80 MMHG | TEMPERATURE: 98 F | HEIGHT: 65.5 IN | WEIGHT: 163 LBS | RESPIRATION RATE: 16 BRPM | SYSTOLIC BLOOD PRESSURE: 142 MMHG | HEART RATE: 68 BPM

## 2022-09-16 DIAGNOSIS — E11.9 TYPE 2 DIABETES MELLITUS WITHOUT COMPLICATION, WITHOUT LONG-TERM CURRENT USE OF INSULIN (HCC): ICD-10-CM

## 2022-09-16 DIAGNOSIS — Z00.00 LABORATORY TESTS ORDERED AS PART OF A COMPLETE PHYSICAL EXAM (CPE): ICD-10-CM

## 2022-09-16 DIAGNOSIS — Z00.00 PHYSICAL EXAM, ANNUAL: Primary | ICD-10-CM

## 2022-09-16 DIAGNOSIS — I10 HYPERTENSION, UNSPECIFIED TYPE: ICD-10-CM

## 2022-09-16 LAB
ALBUMIN SERPL-MCNC: 4.2 G/DL (ref 3.4–5)
ALBUMIN/GLOB SERPL: 1.3 {RATIO} (ref 1–2)
ALP LIVER SERPL-CCNC: 64 U/L
ALT SERPL-CCNC: 41 U/L
ANION GAP SERPL CALC-SCNC: 5 MMOL/L (ref 0–18)
AST SERPL-CCNC: 28 U/L (ref 15–37)
BASOPHILS # BLD AUTO: 0.04 X10(3) UL (ref 0–0.2)
BASOPHILS NFR BLD AUTO: 0.6 %
BILIRUB SERPL-MCNC: 1.6 MG/DL (ref 0.1–2)
BUN BLD-MCNC: 12 MG/DL (ref 7–18)
BUN/CREAT SERPL: 11.1 (ref 10–20)
CALCIUM BLD-MCNC: 9.7 MG/DL (ref 8.5–10.1)
CHLORIDE SERPL-SCNC: 104 MMOL/L (ref 98–112)
CHOLEST SERPL-MCNC: 282 MG/DL (ref ?–200)
CO2 SERPL-SCNC: 28 MMOL/L (ref 21–32)
COMPLEXED PSA SERPL-MCNC: 1.86 NG/ML (ref ?–4)
CREAT BLD-MCNC: 1.08 MG/DL
CREAT UR-SCNC: 246 MG/DL
DEPRECATED RDW RBC AUTO: 43.5 FL (ref 35.1–46.3)
EOSINOPHIL # BLD AUTO: 0.17 X10(3) UL (ref 0–0.7)
EOSINOPHIL NFR BLD AUTO: 2.5 %
ERYTHROCYTE [DISTWIDTH] IN BLOOD BY AUTOMATED COUNT: 13 % (ref 11–15)
EST. AVERAGE GLUCOSE BLD GHB EST-MCNC: 143 MG/DL (ref 68–126)
FASTING PATIENT LIPID ANSWER: YES
FASTING STATUS PATIENT QL REPORTED: YES
GFR SERPLBLD BASED ON 1.73 SQ M-ARVRAT: 75 ML/MIN/1.73M2 (ref 60–?)
GLOBULIN PLAS-MCNC: 3.3 G/DL (ref 2.8–4.4)
GLUCOSE BLD-MCNC: 140 MG/DL (ref 70–99)
HBA1C MFR BLD: 6.6 % (ref ?–5.7)
HCT VFR BLD AUTO: 51.1 %
HDLC SERPL-MCNC: 42 MG/DL (ref 40–59)
HGB BLD-MCNC: 16.9 G/DL
IMM GRANULOCYTES # BLD AUTO: 0.02 X10(3) UL (ref 0–1)
IMM GRANULOCYTES NFR BLD: 0.3 %
LDLC SERPL CALC-MCNC: 188 MG/DL (ref ?–100)
LYMPHOCYTES # BLD AUTO: 1.71 X10(3) UL (ref 1–4)
LYMPHOCYTES NFR BLD AUTO: 25.1 %
MCH RBC QN AUTO: 29.9 PG (ref 26–34)
MCHC RBC AUTO-ENTMCNC: 33.1 G/DL (ref 31–37)
MCV RBC AUTO: 90.4 FL
MICROALBUMIN UR-MCNC: 18.2 MG/DL
MICROALBUMIN/CREAT 24H UR-RTO: 74 UG/MG (ref ?–30)
MONOCYTES # BLD AUTO: 0.57 X10(3) UL (ref 0.1–1)
MONOCYTES NFR BLD AUTO: 8.4 %
NEUTROPHILS # BLD AUTO: 4.3 X10 (3) UL (ref 1.5–7.7)
NEUTROPHILS # BLD AUTO: 4.3 X10(3) UL (ref 1.5–7.7)
NEUTROPHILS NFR BLD AUTO: 63.1 %
NONHDLC SERPL-MCNC: 240 MG/DL (ref ?–130)
OSMOLALITY SERPL CALC.SUM OF ELEC: 286 MOSM/KG (ref 275–295)
PLATELET # BLD AUTO: 174 10(3)UL (ref 150–450)
POTASSIUM SERPL-SCNC: 4.7 MMOL/L (ref 3.5–5.1)
PROT SERPL-MCNC: 7.5 G/DL (ref 6.4–8.2)
RBC # BLD AUTO: 5.65 X10(6)UL
SODIUM SERPL-SCNC: 137 MMOL/L (ref 136–145)
TRIGL SERPL-MCNC: 265 MG/DL (ref 30–149)
TSI SER-ACNC: 1.09 MIU/ML (ref 0.36–3.74)
VLDLC SERPL CALC-MCNC: 56 MG/DL (ref 0–30)
WBC # BLD AUTO: 6.8 X10(3) UL (ref 4–11)

## 2022-09-16 PROCEDURE — 80053 COMPREHEN METABOLIC PANEL: CPT

## 2022-09-16 PROCEDURE — 81015 MICROSCOPIC EXAM OF URINE: CPT

## 2022-09-16 PROCEDURE — 85025 COMPLETE CBC W/AUTO DIFF WBC: CPT

## 2022-09-16 PROCEDURE — 81001 URINALYSIS AUTO W/SCOPE: CPT

## 2022-09-16 PROCEDURE — 84443 ASSAY THYROID STIM HORMONE: CPT

## 2022-09-16 PROCEDURE — 82570 ASSAY OF URINE CREATININE: CPT

## 2022-09-16 PROCEDURE — 80061 LIPID PANEL: CPT

## 2022-09-16 PROCEDURE — 83036 HEMOGLOBIN GLYCOSYLATED A1C: CPT

## 2022-09-16 PROCEDURE — 82043 UR ALBUMIN QUANTITATIVE: CPT

## 2022-09-16 PROCEDURE — 3044F HG A1C LEVEL LT 7.0%: CPT | Performed by: FAMILY MEDICINE

## 2022-09-16 RX ORDER — AMLODIPINE BESYLATE 10 MG/1
10 TABLET ORAL DAILY
Qty: 30 TABLET | Refills: 2 | Status: SHIPPED | OUTPATIENT
Start: 2022-09-16

## 2022-09-16 RX ORDER — PIOGLITAZONEHYDROCHLORIDE 30 MG/1
30 TABLET ORAL DAILY
COMMUNITY
End: 2022-09-16

## 2022-09-16 NOTE — PATIENT INSTRUCTIONS
Start amlodipine 10 mg 1 tablet daily. Continue metformin 500 mg 1 tablet twice a day for diabetes. Watch diet for carbs and fats. Do blood work today. Consider getting [facility] done at [facility] that will help us decide about cholesterol management. Stay active. Set up an appointment for medication follow-up after blood work and heart scan are completed.

## 2022-09-17 LAB
BILIRUB UR QL: NEGATIVE
CLARITY UR: CLEAR
COLOR UR: YELLOW
GLUCOSE UR-MCNC: NEGATIVE MG/DL
HGB UR QL STRIP.AUTO: NEGATIVE
KETONES UR-MCNC: NEGATIVE MG/DL
LEUKOCYTE ESTERASE UR QL STRIP.AUTO: NEGATIVE
NITRITE UR QL STRIP.AUTO: NEGATIVE
PH UR: 5.5 [PH] (ref 5–8)
SP GR UR STRIP: 1.02 (ref 1–1.03)
UROBILINOGEN UR STRIP-ACNC: 0.2

## 2022-09-27 ENCOUNTER — HOSPITAL ENCOUNTER (OUTPATIENT)
Dept: CT IMAGING | Age: 67
Discharge: HOME | End: 2022-09-27
Attending: FAMILY MEDICINE

## 2022-09-27 DIAGNOSIS — Z13.6 SCREENING FOR CARDIOVASCULAR CONDITION: ICD-10-CM

## 2022-11-02 ENCOUNTER — OFFICE VISIT (OUTPATIENT)
Dept: FAMILY MEDICINE CLINIC | Facility: CLINIC | Age: 67
End: 2022-11-02
Payer: COMMERCIAL

## 2022-11-02 ENCOUNTER — TELEPHONE (OUTPATIENT)
Dept: FAMILY MEDICINE CLINIC | Facility: CLINIC | Age: 67
End: 2022-11-02

## 2022-11-02 VITALS
SYSTOLIC BLOOD PRESSURE: 126 MMHG | WEIGHT: 167 LBS | BODY MASS INDEX: 27.49 KG/M2 | DIASTOLIC BLOOD PRESSURE: 82 MMHG | HEART RATE: 78 BPM | RESPIRATION RATE: 14 BRPM | TEMPERATURE: 98 F | HEIGHT: 65.5 IN

## 2022-11-02 DIAGNOSIS — R91.1 LUNG NODULE: ICD-10-CM

## 2022-11-02 DIAGNOSIS — Z57.9 OCCUPATIONAL EXPOSURE IN WORKPLACE: ICD-10-CM

## 2022-11-02 DIAGNOSIS — I10 HYPERTENSION, UNSPECIFIED TYPE: ICD-10-CM

## 2022-11-02 DIAGNOSIS — E11.9 TYPE 2 DIABETES MELLITUS WITHOUT COMPLICATION, WITHOUT LONG-TERM CURRENT USE OF INSULIN (HCC): Primary | ICD-10-CM

## 2022-11-02 DIAGNOSIS — Z82.49 FAMILY HISTORY OF CORONARY ARTERY DISEASE: ICD-10-CM

## 2022-11-02 DIAGNOSIS — E78.00 HYPERCHOLESTEREMIA: ICD-10-CM

## 2022-11-02 DIAGNOSIS — R93.1 ABNORMAL CT SCAN OF HEART: ICD-10-CM

## 2022-11-02 LAB
ATRIAL RATE: 68 BPM
P AXIS: 36 DEGREES
P-R INTERVAL: 142 MS
Q-T INTERVAL: 378 MS
QRS DURATION: 84 MS
QTC CALCULATION (BEZET): 401 MS
R AXIS: 60 DEGREES
T AXIS: 49 DEGREES
VENTRICULAR RATE: 68 BPM

## 2022-11-02 PROCEDURE — 99215 OFFICE O/P EST HI 40 MIN: CPT | Performed by: FAMILY MEDICINE

## 2022-11-02 PROCEDURE — 3074F SYST BP LT 130 MM HG: CPT | Performed by: FAMILY MEDICINE

## 2022-11-02 PROCEDURE — 3079F DIAST BP 80-89 MM HG: CPT | Performed by: FAMILY MEDICINE

## 2022-11-02 PROCEDURE — 93000 ELECTROCARDIOGRAM COMPLETE: CPT | Performed by: FAMILY MEDICINE

## 2022-11-02 PROCEDURE — 3008F BODY MASS INDEX DOCD: CPT | Performed by: FAMILY MEDICINE

## 2022-11-02 RX ORDER — ROSUVASTATIN CALCIUM 5 MG/1
5 TABLET, COATED ORAL NIGHTLY
Qty: 30 TABLET | Refills: 2 | Status: SHIPPED | OUTPATIENT
Start: 2022-11-02

## 2022-11-02 RX ORDER — AMLODIPINE BESYLATE 10 MG/1
10 TABLET ORAL DAILY
Qty: 30 TABLET | Refills: 2 | Status: SHIPPED | OUTPATIENT
Start: 2022-11-02

## 2022-11-02 SDOH — HEALTH STABILITY - PHYSICAL HEALTH: OCCUPATIONAL EXPOSURE TO UNSPECIFIED RISK FACTOR: Z57.9

## 2022-11-02 NOTE — TELEPHONE ENCOUNTER
Pt daughter in law states pt forgot to ask if he should start baby Asprin now or wait until next visit. Please advise. Thank you.

## 2022-11-02 NOTE — PATIENT INSTRUCTIONS
Call [phone] to schedule stress echo. Start rosuvastatin 5 mg 1 tablet daily. Continue amlodipine and metformin at current doses. Recheck blood work in mid January before next visit. Low-fat diet avoid red meat, no butter no cheese less egg yolks. Low carbohydrate diet no bread, no pasta,  no rice,less  potatoes , no sweets. Bring sleep apnea questionnaire for the next visit. Start Aspirin  Enteric-coated 81 mg daily. We will monitor lung nodule and recheck CT scan in 1 year per Fleischner recommendations.

## 2022-11-09 ENCOUNTER — HOSPITAL ENCOUNTER (OUTPATIENT)
Dept: CV DIAGNOSTICS | Facility: HOSPITAL | Age: 67
Discharge: HOME | End: 2022-11-09
Attending: FAMILY MEDICINE
Payer: COMMERCIAL

## 2022-11-09 DIAGNOSIS — R93.1 ABNORMAL CT SCAN OF HEART: ICD-10-CM

## 2022-11-09 DIAGNOSIS — E78.00 HYPERCHOLESTEREMIA: ICD-10-CM

## 2022-11-09 DIAGNOSIS — I10 HYPERTENSION, UNSPECIFIED TYPE: ICD-10-CM

## 2022-11-09 DIAGNOSIS — E11.9 TYPE 2 DIABETES MELLITUS WITHOUT COMPLICATION, WITHOUT LONG-TERM CURRENT USE OF INSULIN (HCC): ICD-10-CM

## 2022-11-09 PROCEDURE — 93017 CV STRESS TEST TRACING ONLY: CPT | Performed by: FAMILY MEDICINE

## 2022-11-09 PROCEDURE — 93018 CV STRESS TEST I&R ONLY: CPT | Performed by: FAMILY MEDICINE

## 2022-11-09 PROCEDURE — 93350 STRESS TTE ONLY: CPT | Performed by: FAMILY MEDICINE

## 2022-11-09 NOTE — PROGRESS NOTES
CARDIODIAGNOSTIC PRELIMINARY REPORT:    Patient declined the use of the  service and preferred to utilize his daughter-in-law;   declination form signed and completed    SE completed, tolerated well  Second set of images pending

## 2022-11-11 DIAGNOSIS — E78.00 HYPERCHOLESTEREMIA: ICD-10-CM

## 2022-11-11 DIAGNOSIS — E11.9 TYPE 2 DIABETES MELLITUS WITHOUT COMPLICATION, WITHOUT LONG-TERM CURRENT USE OF INSULIN (HCC): ICD-10-CM

## 2022-11-11 DIAGNOSIS — R93.1 ABNORMAL CT SCAN OF HEART: ICD-10-CM

## 2022-11-11 DIAGNOSIS — I10 HYPERTENSION, UNSPECIFIED TYPE: ICD-10-CM

## 2022-11-11 DIAGNOSIS — R94.31 ABNORMAL EKG: Primary | ICD-10-CM

## 2023-01-24 ENCOUNTER — MED REC SCAN ONLY (OUTPATIENT)
Dept: FAMILY MEDICINE CLINIC | Facility: CLINIC | Age: 68
End: 2023-01-24

## 2023-02-25 ENCOUNTER — MED REC SCAN ONLY (OUTPATIENT)
Dept: FAMILY MEDICINE CLINIC | Facility: CLINIC | Age: 68
End: 2023-02-25

## 2023-03-02 DIAGNOSIS — I10 HYPERTENSION, UNSPECIFIED TYPE: ICD-10-CM

## 2023-03-06 RX ORDER — AMLODIPINE BESYLATE 10 MG/1
10 TABLET ORAL DAILY
Qty: 30 TABLET | Refills: 1 | Status: SHIPPED | OUTPATIENT
Start: 2023-03-06

## 2023-04-06 DIAGNOSIS — I10 HYPERTENSION, UNSPECIFIED TYPE: ICD-10-CM

## 2023-04-06 RX ORDER — AMLODIPINE BESYLATE 10 MG/1
10 TABLET ORAL DAILY
Qty: 30 TABLET | Refills: 0 | Status: SHIPPED | OUTPATIENT
Start: 2023-04-06

## 2023-05-03 DIAGNOSIS — I10 HYPERTENSION, UNSPECIFIED TYPE: ICD-10-CM

## 2023-05-04 NOTE — TELEPHONE ENCOUNTER
Spoke to pt's daughter in law (on verbal form) Pt has apt with 's office tomorrow. Pt's new INS does not allow for [facility] [address] it is for [facility]. Pt will see if  will prescribe. If not they will call back. Pt wants to continue to see  once he returns from [place] and they figure out the INS issue.

## 2023-05-19 RX ORDER — AMLODIPINE BESYLATE 10 MG/1
10 TABLET ORAL DAILY
Qty: 30 TABLET | Refills: 0 | OUTPATIENT
Start: 2023-05-19

## 2023-05-19 NOTE — TELEPHONE ENCOUNTER
lvm to c/b. Unable to reach pt after several attempts. Per note below from 5/4 indicated pt ins doesn't allow for pt to be seen at [facility], only [facility]. Pt should be requesting for Dr Frederick Mcgarry to prescribe. Cannot close TE d/t pending medication.

## 2023-05-25 DIAGNOSIS — E78.00 HYPERCHOLESTEREMIA: Primary | ICD-10-CM

## 2023-05-25 RX ORDER — ROSUVASTATIN CALCIUM 5 MG/1
TABLET, COATED ORAL
Qty: 30 TABLET | Refills: 0 | Status: SHIPPED | OUTPATIENT
Start: 2023-05-25

## 2023-05-25 NOTE — TELEPHONE ENCOUNTER
Pt's daughter called and is requesting if we can send a refill for Atorvastatin. She was informed that Pt needs a followup/medcheck  but states unable to schedule at this time due to insurance issues. She reports that Pt will be leaving to [place] end of June- October and Pt is planning to get medications in [place] then but for the mean time, they are requesting if they can have a 30tablets refill. Dr. Tip Butler, is it okay to send refill?

## 2023-06-19 DIAGNOSIS — E78.00 HYPERCHOLESTEREMIA: ICD-10-CM

## 2023-06-19 RX ORDER — ROSUVASTATIN CALCIUM 5 MG/1
TABLET, COATED ORAL
Qty: 30 TABLET | Refills: 0 | Status: SHIPPED | OUTPATIENT
Start: 2023-06-19

## 2024-01-15 ENCOUNTER — TELEPHONE (OUTPATIENT)
Dept: FAMILY MEDICINE CLINIC | Facility: CLINIC | Age: 69
End: 2024-01-15

## 2024-01-15 DIAGNOSIS — Z00.00 ROUTINE GENERAL MEDICAL EXAMINATION AT A HEALTH CARE FACILITY: Primary | ICD-10-CM

## 2024-01-15 DIAGNOSIS — Z00.00 LABORATORY EXAMINATION ORDERED AS PART OF A ROUTINE GENERAL MEDICAL EXAMINATION: ICD-10-CM

## 2024-01-15 NOTE — TELEPHONE ENCOUNTER
Pt daughter called, provided updated ins info.    During call, pt daughter requesting orders for annual labs to complete prior to visit on 1/22.    Please place orders and call pt daughter, thank you.

## 2024-01-15 NOTE — TELEPHONE ENCOUNTER
Called patient's daughter in law Monae( on HIPAA).  Informed her that fasting lab tests were ordered.  She voiced understanding and agreed with plan of care.

## 2024-01-18 ENCOUNTER — LAB ENCOUNTER (OUTPATIENT)
Dept: LAB | Age: 69
End: 2024-01-18
Attending: FAMILY MEDICINE
Payer: COMMERCIAL

## 2024-01-18 DIAGNOSIS — Z00.00 LABORATORY EXAMINATION ORDERED AS PART OF A ROUTINE GENERAL MEDICAL EXAMINATION: ICD-10-CM

## 2024-01-18 DIAGNOSIS — Z00.00 ROUTINE GENERAL MEDICAL EXAMINATION AT A HEALTH CARE FACILITY: ICD-10-CM

## 2024-01-18 LAB
ALBUMIN SERPL-MCNC: 4.2 G/DL (ref 3.4–5)
ALBUMIN/GLOB SERPL: 1.2 {RATIO} (ref 1–2)
ALP LIVER SERPL-CCNC: 96 U/L
ALT SERPL-CCNC: 28 U/L
ANION GAP SERPL CALC-SCNC: 9 MMOL/L (ref 0–18)
AST SERPL-CCNC: 18 U/L (ref 15–37)
BASOPHILS # BLD AUTO: 0.06 X10(3) UL (ref 0–0.2)
BASOPHILS NFR BLD AUTO: 0.7 %
BILIRUB SERPL-MCNC: 1.8 MG/DL (ref 0.1–2)
BILIRUB UR QL STRIP.AUTO: NEGATIVE
BUN BLD-MCNC: 15 MG/DL (ref 9–23)
CALCIUM BLD-MCNC: 9.4 MG/DL (ref 8.5–10.1)
CHLORIDE SERPL-SCNC: 106 MMOL/L (ref 98–112)
CHOLEST SERPL-MCNC: 177 MG/DL (ref ?–200)
CLARITY UR REFRACT.AUTO: CLEAR
CO2 SERPL-SCNC: 24 MMOL/L (ref 21–32)
COMPLEXED PSA SERPL-MCNC: 2.04 NG/ML (ref ?–4)
CREAT BLD-MCNC: 0.88 MG/DL
CREAT UR-SCNC: 98.9 MG/DL
EGFRCR SERPLBLD CKD-EPI 2021: 93 ML/MIN/1.73M2 (ref 60–?)
EOSINOPHIL # BLD AUTO: 0.75 X10(3) UL (ref 0–0.7)
EOSINOPHIL NFR BLD AUTO: 9 %
ERYTHROCYTE [DISTWIDTH] IN BLOOD BY AUTOMATED COUNT: 13.4 %
EST. AVERAGE GLUCOSE BLD GHB EST-MCNC: 183 MG/DL (ref 68–126)
FASTING PATIENT LIPID ANSWER: YES
FASTING STATUS PATIENT QL REPORTED: YES
GLOBULIN PLAS-MCNC: 3.6 G/DL (ref 2.8–4.4)
GLUCOSE BLD-MCNC: 138 MG/DL (ref 70–99)
GLUCOSE UR STRIP.AUTO-MCNC: >1000 MG/DL
HBA1C MFR BLD: 8 % (ref ?–5.7)
HCT VFR BLD AUTO: 53.8 %
HDLC SERPL-MCNC: 41 MG/DL (ref 40–59)
HGB BLD-MCNC: 18.2 G/DL
IMM GRANULOCYTES # BLD AUTO: 0.02 X10(3) UL (ref 0–1)
IMM GRANULOCYTES NFR BLD: 0.2 %
KETONES UR STRIP.AUTO-MCNC: NEGATIVE MG/DL
LDLC SERPL CALC-MCNC: 108 MG/DL (ref ?–100)
LEUKOCYTE ESTERASE UR QL STRIP.AUTO: NEGATIVE
LYMPHOCYTES # BLD AUTO: 2.31 X10(3) UL (ref 1–4)
LYMPHOCYTES NFR BLD AUTO: 27.7 %
MCH RBC QN AUTO: 29.8 PG (ref 26–34)
MCHC RBC AUTO-ENTMCNC: 33.8 G/DL (ref 31–37)
MCV RBC AUTO: 88.1 FL
MICROALBUMIN UR-MCNC: 4.2 MG/DL
MICROALBUMIN/CREAT 24H UR-RTO: 42.5 UG/MG (ref ?–30)
MONOCYTES # BLD AUTO: 0.56 X10(3) UL (ref 0.1–1)
MONOCYTES NFR BLD AUTO: 6.7 %
NEUTROPHILS # BLD AUTO: 4.63 X10 (3) UL (ref 1.5–7.7)
NEUTROPHILS # BLD AUTO: 4.63 X10(3) UL (ref 1.5–7.7)
NEUTROPHILS NFR BLD AUTO: 55.7 %
NITRITE UR QL STRIP.AUTO: NEGATIVE
NONHDLC SERPL-MCNC: 136 MG/DL (ref ?–130)
OSMOLALITY SERPL CALC.SUM OF ELEC: 291 MOSM/KG (ref 275–295)
PH UR STRIP.AUTO: 5.5 [PH] (ref 5–8)
PLATELET # BLD AUTO: 140 10(3)UL (ref 150–450)
POTASSIUM SERPL-SCNC: 3.7 MMOL/L (ref 3.5–5.1)
PROT SERPL-MCNC: 7.8 G/DL (ref 6.4–8.2)
PROT UR STRIP.AUTO-MCNC: NEGATIVE MG/DL
RBC # BLD AUTO: 6.11 X10(6)UL
RBC UR QL AUTO: NEGATIVE
SODIUM SERPL-SCNC: 139 MMOL/L (ref 136–145)
SP GR UR STRIP.AUTO: 1.03 (ref 1–1.03)
TRIGL SERPL-MCNC: 159 MG/DL (ref 30–149)
TSI SER-ACNC: 1.39 MIU/ML (ref 0.36–3.74)
UROBILINOGEN UR STRIP.AUTO-MCNC: NORMAL MG/DL
VLDLC SERPL CALC-MCNC: 27 MG/DL (ref 0–30)
WBC # BLD AUTO: 8.3 X10(3) UL (ref 4–11)

## 2024-01-18 PROCEDURE — 83036 HEMOGLOBIN GLYCOSYLATED A1C: CPT

## 2024-01-18 PROCEDURE — 85025 COMPLETE CBC W/AUTO DIFF WBC: CPT

## 2024-01-18 PROCEDURE — 80061 LIPID PANEL: CPT

## 2024-01-18 PROCEDURE — 81003 URINALYSIS AUTO W/O SCOPE: CPT

## 2024-01-18 PROCEDURE — 82570 ASSAY OF URINE CREATININE: CPT

## 2024-01-18 PROCEDURE — 84443 ASSAY THYROID STIM HORMONE: CPT

## 2024-01-18 PROCEDURE — 80053 COMPREHEN METABOLIC PANEL: CPT

## 2024-01-18 PROCEDURE — 82043 UR ALBUMIN QUANTITATIVE: CPT

## 2024-01-22 ENCOUNTER — OFFICE VISIT (OUTPATIENT)
Dept: FAMILY MEDICINE CLINIC | Facility: CLINIC | Age: 69
End: 2024-01-22
Payer: COMMERCIAL

## 2024-01-22 VITALS
SYSTOLIC BLOOD PRESSURE: 129 MMHG | HEIGHT: 65.5 IN | RESPIRATION RATE: 16 BRPM | HEART RATE: 74 BPM | DIASTOLIC BLOOD PRESSURE: 78 MMHG | TEMPERATURE: 98 F | WEIGHT: 158 LBS | BODY MASS INDEX: 26.01 KG/M2

## 2024-01-22 DIAGNOSIS — Z00.00 PHYSICAL EXAM, ANNUAL: Primary | ICD-10-CM

## 2024-01-22 DIAGNOSIS — I10 HYPERTENSION, UNSPECIFIED TYPE: ICD-10-CM

## 2024-01-22 DIAGNOSIS — E78.00 HYPERCHOLESTEREMIA: ICD-10-CM

## 2024-01-22 DIAGNOSIS — E11.9 TYPE 2 DIABETES MELLITUS WITHOUT COMPLICATION, WITHOUT LONG-TERM CURRENT USE OF INSULIN (HCC): ICD-10-CM

## 2024-01-22 DIAGNOSIS — R93.1 ABNORMAL CT SCAN OF HEART: ICD-10-CM

## 2024-01-22 DIAGNOSIS — E11.65 UNCONTROLLED TYPE 2 DIABETES MELLITUS WITH HYPERGLYCEMIA (HCC): ICD-10-CM

## 2024-01-22 DIAGNOSIS — Z82.49 FAMILY HISTORY OF CORONARY ARTERY DISEASE: ICD-10-CM

## 2024-01-22 DIAGNOSIS — R12 HEARTBURN: ICD-10-CM

## 2024-01-22 DIAGNOSIS — Z23 NEED FOR VACCINATION FOR STREP PNEUMONIAE: ICD-10-CM

## 2024-01-22 DIAGNOSIS — Z12.11 SCREENING FOR COLON CANCER: ICD-10-CM

## 2024-01-22 DIAGNOSIS — R09.81 NASAL CONGESTION: ICD-10-CM

## 2024-01-22 RX ORDER — ATORVASTATIN CALCIUM 10 MG/1
10 TABLET, FILM COATED ORAL NIGHTLY
Qty: 30 TABLET | Refills: 3 | Status: SHIPPED | OUTPATIENT
Start: 2024-01-22

## 2024-01-22 RX ORDER — OMEPRAZOLE 40 MG/1
40 CAPSULE, DELAYED RELEASE ORAL DAILY
Qty: 30 CAPSULE | Refills: 3 | Status: SHIPPED | OUTPATIENT
Start: 2024-01-22 | End: 2025-01-16

## 2024-01-22 RX ORDER — FLUTICASONE PROPIONATE 50 MCG
2 SPRAY, SUSPENSION (ML) NASAL DAILY
Qty: 1 EACH | Refills: 2 | Status: SHIPPED | OUTPATIENT
Start: 2024-01-22 | End: 2025-01-16

## 2024-01-22 RX ORDER — ASPIRIN 81 MG/1
81 TABLET ORAL DAILY
COMMUNITY
Start: 2022-12-20

## 2024-01-22 RX ORDER — METOPROLOL SUCCINATE 25 MG/1
25 TABLET, EXTENDED RELEASE ORAL DAILY
Qty: 30 TABLET | Refills: 3 | Status: SHIPPED | OUTPATIENT
Start: 2024-01-22

## 2024-01-22 RX ORDER — METOPROLOL SUCCINATE 25 MG/1
25 TABLET, EXTENDED RELEASE ORAL DAILY
COMMUNITY
Start: 2023-02-21 | End: 2024-01-22

## 2024-01-22 RX ORDER — AMLODIPINE BESYLATE 10 MG/1
10 TABLET ORAL DAILY
Qty: 30 TABLET | Refills: 3 | Status: SHIPPED | OUTPATIENT
Start: 2024-01-22

## 2024-01-22 NOTE — PATIENT INSTRUCTIONS
Call [phone] to schedule Covid shot.   Zaczac Metformin,  Agnes garcia,  Pic duzo [facility].   Zaczac Omeprazole 40 mg 1 tab 30 min przed sniadaniem.   Do test from the stool.   Kenna gomez.   Zrobic badania z krwi po 4/18/24.   Umowic sie na wizyte pod koniec sahrania.

## 2024-01-23 NOTE — PROGRESS NOTES
Bucky Mcneal is a 68 year old male who presents for a complete physical exam.  Also follow-up on diabetes hypertension hypercholesterolemia, heartburn, family history of coronary disease, abnormal CT heart scan  HPI:   Pt was not seen in the office for a while.  He has hypertension diagnosed multiple years ago. He was living at that time in [place].  Right now he moved closer to his family and he is in the [place].  Blood pressure is stable doing well with medication.  Denies any chest pain, no shortness of breath,  no palpitations.    Patient is diabetic.  Last hemoglobin A1c came back at 8.0 in January of this year.  Patient was taking metformin but after going to [place] medication was switched to Jardiance.  He is taking 10 mg twice a day.  Metformin was stopped.  He had eye examination done we will refer him to the doctor here locally to get eye examination denies any numbness or tingling of the feet.  He is trying to change his diet.  Monitor.  Right now he is not controlled diabetic.    Coronary arthrosclerosis/abnormal heart scan score -patient's family history of coronary artery disease.  Continuing cholesterol medication.  Rosuvastatin gave him muscle aches started on atorvastatin back in [place].  Doing better on the medication we will continue current medication monitor blood work.  patient is to be on cholesterol medications due to abnormal CT heart scan score.    Patient complains of having heartburn and a lot of stomach upset couple weeks ago.  Started to have some chamomile tea and some flaxseed which helped significantly right now doing okay.  Will have him to monitor symptoms of the test for H. pylori from the stool.    Patient complains of having nasal congestion stuffiness.  Will start Flonase.  He was placed back in [place].    Immunization History   Administered Date(s) Administered   • Covid-19 Vaccine Pfizer 30 mcg/0.3 ml 03/17/2021, 04/07/2021, 11/17/2021   • Covid-19 Vaccine Pfizer Bivalent  30mcg/0.3mL 01/12/2023   • Flucelvax 0.5ml Vaccine 10/11/2022   • Fluvirin, 3 Years & >, Im 09/21/2016   • Influenza 09/19/2016, 09/21/2016   • Pneumococcal Conjugate PCV20 01/22/2024   • Pneumovax 23 02/09/2021     Wt Readings from Last 6 Encounters:   01/22/24 158 lb (71.7 kg)   11/02/22 167 lb (75.8 kg)   09/16/22 163 lb (73.9 kg)     Body mass index is 25.89 kg/m².     Cholesterol, Total (mg/dL)   Date Value   01/18/2024 177   09/16/2022 282 (H)     HDL Cholesterol (mg/dL)   Date Value   01/18/2024 41   09/16/2022 42     LDL Cholesterol (mg/dL)   Date Value   01/18/2024 108 (H)   09/16/2022 188 (H)     AST (U/L)   Date Value   01/18/2024 18   09/16/2022 28     ALT (U/L)   Date Value   01/18/2024 28   09/16/2022 41      No results found for: \"PSA\"  No results found for: \"GLUCOSE\"    Current Outpatient Medications   Medication Sig Dispense Refill   • aspirin 81 MG Oral Tab EC Take 1 tablet (81 mg total) by mouth daily.     • atorvastatin 10 MG Oral Tab Take 1 tablet (10 mg total) by mouth nightly. 30 tablet 3   • metFORMIN 500 MG Oral Tab Take 1 tablet (500 mg total) by mouth 2 (two) times daily with meals. 60 tablet 3   • amLODIPine 10 MG Oral Tab Take 1 tablet (10 mg total) by mouth daily. 30 tablet 3   • Omeprazole 40 MG Oral Capsule Delayed Release Take 1 capsule (40 mg total) by mouth daily. 30 capsule 3   • empagliflozin (JARDIANCE) 10 MG Oral Tab Take 1 tablet (10 mg total) by mouth daily. 30 tablet 3   • metoprolol succinate ER 25 MG Oral Tablet 24 Hr Take 1 tablet (25 mg total) by mouth daily. 30 tablet 3   • fluticasone propionate 50 MCG/ACT Nasal Suspension 2 sprays by Each Nare route daily. 1 each 2      Past Medical History:   Diagnosis Date   • Diabetes (HCC)    • Essential hypertension    • Hyperlipidemia    • SVT (supraventricular tachycardia)       Past Surgical History:   Procedure Laterality Date   • HIP REPLACEMENT SURGERY  2019   • OTHER SURGICAL HISTORY  2020    left rotator cuff      Family  History   Problem Relation Age of Onset   • Heart Disorder Father         coronary artery disease   • Diabetes Father    • Other (PAD) Father    • Other (Other) Mother         circulation   • Other (shingles) Mother       Social History:  Social History     Socioeconomic History   • Marital status:    Tobacco Use   • Smoking status: Never   • Smokeless tobacco: Never   Vaping Use   • Vaping Use: Never used   Substance and Sexual Activity   • Alcohol use: Yes     Alcohol/week: 2.0 standard drinks of alcohol     Types: 1 Glasses of wine, 1 Cans of beer per week   • Drug use: Never   Other Topics Concern   • Caffeine Concern No   • Exercise No   • Seat Belt Yes   • Special Diet No   • Stress Concern No   • Weight Concern No      Occ: retired. : yes. Children: 2.   Exercise: walking.  Diet: watches calories closely     REVIEW OF SYSTEMS:   GENERAL: feels well otherwise  SKIN: denies any unusual skin lesions  EYES:denies blurred vision or double vision  HEENT: some nasal congestion,no  sinus pain or ST  LUNGS: denies shortness of breath with exertion  CARDIOVASCULAR: denies chest pain on exertion  GI: denies abdominal pain,denies heartburn  : denies nocturia or changes in stream  MUSCULOSKELETAL: denies back pain  NEURO: denies headaches  PSYCHE: denies depression or anxiety  HEMATOLOGIC: denies hx of anemia  ENDOCRINE: denies thyroid history  ALL/ASTHMA: denies hx of allergy or asthma    EXAM:   /78 (BP Location: Left arm, Patient Position: Sitting, Cuff Size: adult)   Pulse 74   Temp 97.9 °F (36.6 °C) (Temporal)   Resp 16   Ht 5' 5.5\" (1.664 m)   Wt 158 lb (71.7 kg)   BMI 25.89 kg/m²   Body mass index is 25.89 kg/m².   GENERAL: well developed, well nourished,in no apparent distress  SKIN: no rashes,no suspicious lesions  HEENT: atraumatic, normocephalic,ears and throat are clear  EYES:PERRLA, EOMI, ,conjunctiva are clear  NECK: supple,no adenopathy  CHEST: no chest tenderness  BREAST: no  dominant or suspicious mass  LUNGS: clear to auscultation  CARDIO: RRR without murmur  GI: good BS's,no masses, HSM or tenderness  : two descended testes,no masses,no hernia,no penile lesions  RECTAL: good rectal tone, prostate shows no masses,   MUSCULOSKELETAL: back is not tender,FROM of the back  EXTREMITIES: no cyanosis, clubbing or edema  Bilateral barefoot skin diabetic exam is normal, visualized feet and the appearance is normal except mild osteoarthritis changes   Bilateral monofilament/sensation of both feet is normal.  Pulsation pedal pulse exam of both lower legs/feet is normal as well.  NEURO: Oriented times three,cranial nerves are intact,motor and sensory are grossly intact    Results for orders placed or performed in visit on 01/18/24   TSH W Reflex To Free T4   Result Value Ref Range    TSH 1.390 0.358 - 3.740 mIU/mL   Comp Metabolic Panel (14)   Result Value Ref Range    Glucose 138 (H) 70 - 99 mg/dL    Sodium 139 136 - 145 mmol/L    Potassium 3.7 3.5 - 5.1 mmol/L    Chloride 106 98 - 112 mmol/L    CO2 24.0 21.0 - 32.0 mmol/L    Anion Gap 9 0 - 18 mmol/L    BUN 15 9 - 23 mg/dL    Creatinine 0.88 0.70 - 1.30 mg/dL    Calcium, Total 9.4 8.5 - 10.1 mg/dL    Calculated Osmolality 291 275 - 295 mOsm/kg    eGFR-Cr 93 >=60 mL/min/1.73m2    AST 18 15 - 37 U/L    ALT 28 16 - 61 U/L    Alkaline Phosphatase 96 45 - 117 U/L    Bilirubin, Total 1.8 0.1 - 2.0 mg/dL    Total Protein 7.8 6.4 - 8.2 g/dL    Albumin 4.2 3.4 - 5.0 g/dL    Globulin  3.6 2.8 - 4.4 g/dL    A/G Ratio 1.2 1.0 - 2.0    Patient Fasting for CMP? Yes    Lipid Panel   Result Value Ref Range    Cholesterol, Total 177 <200 mg/dL    HDL Cholesterol 41 40 - 59 mg/dL    Triglycerides 159 (H) 30 - 149 mg/dL    LDL Cholesterol 108 (H) <100 mg/dL    VLDL 27 0 - 30 mg/dL    Non HDL Chol 136 (H) <130 mg/dL    Patient Fasting for Lipid? Yes    Microalb/Creat Ratio, Random Urine   Result Value Ref Range    Microalbumin, Urine 4.20 mg/dL    Creatinine Ur  Random 98.90 mg/dL    Malb/Cre Calc 42.5 (H) <=30.0 ug/mg   PSA Total, Screen   Result Value Ref Range    Prostate Specific Antigen Screen 2.04 <=4.00 ng/mL   Urinalysis with Culture Reflex    Specimen: Urine, clean catch   Result Value Ref Range    Urine Color Light-Yellow Yellow    Clarity Urine Clear Clear    Spec Gravity 1.030 1.005 - 1.030    Glucose Urine >1000 (A) Normal mg/dL    Bilirubin Urine Negative Negative    Ketones Urine Negative Negative mg/dL    Blood Urine Negative Negative    pH Urine 5.5 5.0 - 8.0    Protein Urine Negative Negative mg/dL    Urobilinogen Urine Normal Normal mg/dL    Nitrite Urine Negative Negative    Leukocyte Esterase Urine Negative Negative    Microscopic Microscopic not indicated    Hemoglobin A1C   Result Value Ref Range    HgbA1C 8.0 (H) <5.7 %    Estimated Average Glucose 183 (H) 68 - 126 mg/dL   CBC W/ DIFFERENTIAL   Result Value Ref Range    WBC 8.3 4.0 - 11.0 x10(3) uL    RBC 6.11 (H) 3.80 - 5.80 x10(6)uL    HGB 18.2 (H) 13.0 - 17.5 g/dL    HCT 53.8 (H) 39.0 - 53.0 %    .0 (L) 150.0 - 450.0 10(3)uL    MCV 88.1 80.0 - 100.0 fL    MCH 29.8 26.0 - 34.0 pg    MCHC 33.8 31.0 - 37.0 g/dL    RDW 13.4 %    Neutrophil Absolute Prelim 4.63 1.50 - 7.70 x10 (3) uL    Neutrophil Absolute 4.63 1.50 - 7.70 x10(3) uL    Lymphocyte Absolute 2.31 1.00 - 4.00 x10(3) uL    Monocyte Absolute 0.56 0.10 - 1.00 x10(3) uL    Eosinophil Absolute 0.75 (H) 0.00 - 0.70 x10(3) uL    Basophil Absolute 0.06 0.00 - 0.20 x10(3) uL    Immature Granulocyte Absolute 0.02 0.00 - 1.00 x10(3) uL    Neutrophil % 55.7 %    Lymphocyte % 27.7 %    Monocyte % 6.7 %    Eosinophil % 9.0 %    Basophil % 0.7 %    Immature Granulocyte % 0.2 %     ASSESSMENT AND PLAN:   Bucky Mcneal is a 68 year old male who presents for a complete physical exam.   Encounter Diagnoses   Name Primary?   • Physical exam, annual Yes   • Type 2 diabetes mellitus without complication, without long-term current use of insulin (HCC)     • Hypercholesteremia    • Uncontrolled type 2 diabetes mellitus with hyperglycemia (HCC)    • Screening for colon cancer    • Hypertension, unspecified type    • Heartburn    • Need for vaccination for Strep pneumoniae    • Family history of coronary artery disease    • Abnormal CT scan of heart    • Nasal congestion        Orders Placed This Encounter   Procedures   • Comp Metabolic Panel (14)   • Hemoglobin A1C   • Lipid Panel   • Microalb/Creat Ratio, Random Urine   • Occult Blood, Fecal, FIT Immunoassay   • H. Pylori Stool Ag, EIA [E]   • Prevnar 20 (PCV20) [08193]       Meds & Refills for this Visit:  Requested Prescriptions     Signed Prescriptions Disp Refills   • atorvastatin 10 MG Oral Tab 30 tablet 3     Sig: Take 1 tablet (10 mg total) by mouth nightly.   • metFORMIN 500 MG Oral Tab 60 tablet 3     Sig: Take 1 tablet (500 mg total) by mouth 2 (two) times daily with meals.   • amLODIPine 10 MG Oral Tab 30 tablet 3     Sig: Take 1 tablet (10 mg total) by mouth daily.   • Omeprazole 40 MG Oral Capsule Delayed Release 30 capsule 3     Sig: Take 1 capsule (40 mg total) by mouth daily.   • empagliflozin (JARDIANCE) 10 MG Oral Tab 30 tablet 3     Sig: Take 1 tablet (10 mg total) by mouth daily.   • metoprolol succinate ER 25 MG Oral Tablet 24 Hr 30 tablet 3     Sig: Take 1 tablet (25 mg total) by mouth daily.   • fluticasone propionate 50 MCG/ACT Nasal Suspension 1 each 2     Si sprays by Each Nare route daily.   Call [phone] to schedule Covid shot.   Zaalciraac Metformin,  Agnes garcia  Pic duzo plynow.   Zaczac Omeprazole 40 mg 1 tab 30 min przed sniadaniem.   Do test from the stool.   Kenna gomez.   Zrobic badania z krwi po 24.   Umowic sie na wizyte pod arnaldoc yvrose.     Schedule COVID shot.  Start metformin.  Continue other medications.  Continue Jardiance 1 tablet daily in the morning.  Low-fat low sugar diet.  Keep good hydration.  Start omeprazole 40 mg 1 tablet half an hour  before breakfast.  Test from the stool.  See eye doctor.  Do blood work in April before next visit.      Imaging & Consults:  OPHTHALMOLOGY - INTERNAL  PCV20 VACCINE FOR INTRAMUSCULAR USE  Pt's weight is Body mass index is 25.89 kg/m²., recommended low carb diet and aerobic exercise 30 minutes three times weekly. Health maintenance, will check fasting Lipids, CMP, CBC and PSA. Pt will forward results from last  screening colonoscopy. The patient indicates understanding of these issues and agrees to the plan.  The patient is asked to return for CPX in 1 year.  Medication check April 2024.

## 2024-01-24 ENCOUNTER — TELEPHONE (OUTPATIENT)
Dept: FAMILY MEDICINE CLINIC | Facility: CLINIC | Age: 69
End: 2024-01-24

## 2024-01-25 ENCOUNTER — MED REC SCAN ONLY (OUTPATIENT)
Dept: FAMILY MEDICINE CLINIC | Facility: CLINIC | Age: 69
End: 2024-01-25

## 2024-01-26 ENCOUNTER — LAB ENCOUNTER (OUTPATIENT)
Dept: LAB | Age: 69
End: 2024-01-26
Attending: FAMILY MEDICINE
Payer: COMMERCIAL

## 2024-01-26 DIAGNOSIS — Z12.11 SCREENING FOR COLON CANCER: ICD-10-CM

## 2024-01-26 LAB — HEMOCCULT STL QL: NEGATIVE

## 2024-01-26 PROCEDURE — 82274 ASSAY TEST FOR BLOOD FECAL: CPT

## 2024-01-30 ENCOUNTER — TELEPHONE (OUTPATIENT)
Dept: FAMILY MEDICINE CLINIC | Facility: CLINIC | Age: 69
End: 2024-01-30

## 2024-01-30 NOTE — TELEPHONE ENCOUNTER
Patient signed medical records request form in office to receive records from [facility]. Request form faxed to [phone] on 1/30/24.    Request form also sent to scanning to be scanned to patient chart.

## 2024-02-01 ENCOUNTER — MED REC SCAN ONLY (OUTPATIENT)
Dept: FAMILY MEDICINE CLINIC | Facility: CLINIC | Age: 69
End: 2024-02-01

## 2024-02-20 ENCOUNTER — MED REC SCAN ONLY (OUTPATIENT)
Dept: FAMILY MEDICINE CLINIC | Facility: CLINIC | Age: 69
End: 2024-02-20

## 2024-03-15 DIAGNOSIS — E78.00 HYPERCHOLESTEREMIA: ICD-10-CM

## 2024-03-15 DIAGNOSIS — E11.9 TYPE 2 DIABETES MELLITUS WITHOUT COMPLICATION, WITHOUT LONG-TERM CURRENT USE OF INSULIN (HCC): ICD-10-CM

## 2024-03-15 DIAGNOSIS — I10 HYPERTENSION, UNSPECIFIED TYPE: ICD-10-CM

## 2024-03-20 RX ORDER — OMEPRAZOLE 40 MG/1
40 CAPSULE, DELAYED RELEASE ORAL DAILY
Qty: 90 CAPSULE | Refills: 0 | Status: SHIPPED | OUTPATIENT
Start: 2024-03-20 | End: 2025-03-15

## 2024-03-20 RX ORDER — AMLODIPINE BESYLATE 10 MG/1
10 TABLET ORAL DAILY
Qty: 90 TABLET | Refills: 0 | Status: SHIPPED | OUTPATIENT
Start: 2024-03-20

## 2024-03-20 RX ORDER — FLUTICASONE PROPIONATE 50 MCG
2 SPRAY, SUSPENSION (ML) NASAL DAILY
Qty: 48 ML | Refills: 0 | Status: SHIPPED | OUTPATIENT
Start: 2024-03-20

## 2024-03-20 RX ORDER — ATORVASTATIN CALCIUM 10 MG/1
10 TABLET, FILM COATED ORAL NIGHTLY
Qty: 90 TABLET | Refills: 0 | Status: SHIPPED | OUTPATIENT
Start: 2024-03-20

## 2024-03-20 RX ORDER — METOPROLOL SUCCINATE 25 MG/1
25 TABLET, EXTENDED RELEASE ORAL DAILY
Qty: 90 TABLET | Refills: 0 | Status: SHIPPED | OUTPATIENT
Start: 2024-03-20

## 2024-04-25 ENCOUNTER — LAB ENCOUNTER (OUTPATIENT)
Dept: LAB | Age: 69
End: 2024-04-25
Attending: FAMILY MEDICINE
Payer: COMMERCIAL

## 2024-04-25 DIAGNOSIS — M79.605 PAIN IN BOTH LOWER EXTREMITIES: ICD-10-CM

## 2024-04-25 DIAGNOSIS — E78.00 HYPERCHOLESTEREMIA: ICD-10-CM

## 2024-04-25 DIAGNOSIS — E11.9 TYPE 2 DIABETES MELLITUS WITHOUT COMPLICATION, WITHOUT LONG-TERM CURRENT USE OF INSULIN (HCC): ICD-10-CM

## 2024-04-25 DIAGNOSIS — M79.604 PAIN IN BOTH LOWER EXTREMITIES: ICD-10-CM

## 2024-04-25 LAB
ALBUMIN SERPL-MCNC: 3.9 G/DL (ref 3.4–5)
ALBUMIN/GLOB SERPL: 1.1 {RATIO} (ref 1–2)
ALP LIVER SERPL-CCNC: 69 U/L
ALT SERPL-CCNC: 30 U/L
ANION GAP SERPL CALC-SCNC: 9 MMOL/L (ref 0–18)
AST SERPL-CCNC: 29 U/L (ref 15–37)
BILIRUB SERPL-MCNC: 2.1 MG/DL (ref 0.1–2)
BUN BLD-MCNC: 11 MG/DL (ref 9–23)
CALCIUM BLD-MCNC: 9.3 MG/DL (ref 8.5–10.1)
CHLORIDE SERPL-SCNC: 106 MMOL/L (ref 98–112)
CHOLEST SERPL-MCNC: 167 MG/DL (ref ?–200)
CO2 SERPL-SCNC: 25 MMOL/L (ref 21–32)
CREAT BLD-MCNC: 0.82 MG/DL
CREAT UR-SCNC: 90 MG/DL
EGFRCR SERPLBLD CKD-EPI 2021: 94 ML/MIN/1.73M2 (ref 60–?)
EST. AVERAGE GLUCOSE BLD GHB EST-MCNC: 148 MG/DL (ref 68–126)
FASTING PATIENT LIPID ANSWER: YES
FASTING STATUS PATIENT QL REPORTED: YES
GLOBULIN PLAS-MCNC: 3.4 G/DL (ref 2.8–4.4)
GLUCOSE BLD-MCNC: 115 MG/DL (ref 70–99)
HBA1C MFR BLD: 6.8 % (ref ?–5.7)
HDLC SERPL-MCNC: 53 MG/DL (ref 40–59)
LDLC SERPL CALC-MCNC: 96 MG/DL (ref ?–100)
MICROALBUMIN UR-MCNC: 9.18 MG/DL
MICROALBUMIN/CREAT 24H UR-RTO: 102 UG/MG (ref ?–30)
NONHDLC SERPL-MCNC: 114 MG/DL (ref ?–130)
OSMOLALITY SERPL CALC.SUM OF ELEC: 290 MOSM/KG (ref 275–295)
POTASSIUM SERPL-SCNC: 4.2 MMOL/L (ref 3.5–5.1)
PROT SERPL-MCNC: 7.3 G/DL (ref 6.4–8.2)
SODIUM SERPL-SCNC: 140 MMOL/L (ref 136–145)
TRIGL SERPL-MCNC: 98 MG/DL (ref 30–149)
VLDLC SERPL CALC-MCNC: 16 MG/DL (ref 0–30)

## 2024-04-25 PROCEDURE — 80053 COMPREHEN METABOLIC PANEL: CPT

## 2024-04-25 PROCEDURE — 82043 UR ALBUMIN QUANTITATIVE: CPT

## 2024-04-25 PROCEDURE — 82570 ASSAY OF URINE CREATININE: CPT

## 2024-04-25 PROCEDURE — 80061 LIPID PANEL: CPT

## 2024-04-25 PROCEDURE — 83036 HEMOGLOBIN GLYCOSYLATED A1C: CPT

## 2024-04-25 PROCEDURE — 82550 ASSAY OF CK (CPK): CPT

## 2024-04-29 ENCOUNTER — OFFICE VISIT (OUTPATIENT)
Dept: FAMILY MEDICINE CLINIC | Facility: CLINIC | Age: 69
End: 2024-04-29
Payer: COMMERCIAL

## 2024-04-29 VITALS
WEIGHT: 158 LBS | TEMPERATURE: 99 F | HEIGHT: 65.5 IN | HEART RATE: 60 BPM | BODY MASS INDEX: 26.01 KG/M2 | DIASTOLIC BLOOD PRESSURE: 79 MMHG | SYSTOLIC BLOOD PRESSURE: 132 MMHG | RESPIRATION RATE: 14 BRPM

## 2024-04-29 DIAGNOSIS — R91.1 LUNG NODULE: ICD-10-CM

## 2024-04-29 DIAGNOSIS — R12 HEARTBURN: ICD-10-CM

## 2024-04-29 DIAGNOSIS — Z57.9 OCCUPATIONAL EXPOSURE IN WORKPLACE: ICD-10-CM

## 2024-04-29 DIAGNOSIS — M79.604 PAIN IN BOTH LOWER EXTREMITIES: ICD-10-CM

## 2024-04-29 DIAGNOSIS — T63.441A ALLERGIC REACTION TO BEE STING: ICD-10-CM

## 2024-04-29 DIAGNOSIS — R93.1 ABNORMAL CT SCAN OF HEART: ICD-10-CM

## 2024-04-29 DIAGNOSIS — E11.9 TYPE 2 DIABETES MELLITUS WITHOUT COMPLICATION, WITHOUT LONG-TERM CURRENT USE OF INSULIN (HCC): Primary | ICD-10-CM

## 2024-04-29 DIAGNOSIS — E78.00 HYPERCHOLESTEREMIA: ICD-10-CM

## 2024-04-29 DIAGNOSIS — R60.0 BILATERAL LEG EDEMA: ICD-10-CM

## 2024-04-29 DIAGNOSIS — M79.605 PAIN IN BOTH LOWER EXTREMITIES: ICD-10-CM

## 2024-04-29 DIAGNOSIS — I10 HYPERTENSION, UNSPECIFIED TYPE: ICD-10-CM

## 2024-04-29 DIAGNOSIS — Z82.49 FAMILY HISTORY OF CORONARY ARTERY DISEASE: ICD-10-CM

## 2024-04-29 LAB — CK SERPL-CCNC: 143 U/L

## 2024-04-29 RX ORDER — EPINEPHRINE 0.3 MG/.3ML
0.3 INJECTION SUBCUTANEOUS AS NEEDED
Qty: 1 EACH | Refills: 1 | Status: SHIPPED | OUTPATIENT
Start: 2024-04-29 | End: 2025-04-29

## 2024-04-29 RX ORDER — METOPROLOL SUCCINATE 25 MG/1
25 TABLET, EXTENDED RELEASE ORAL DAILY
Qty: 90 TABLET | Refills: 1 | Status: SHIPPED | OUTPATIENT
Start: 2024-04-29

## 2024-04-29 RX ORDER — OMEPRAZOLE 40 MG/1
40 CAPSULE, DELAYED RELEASE ORAL DAILY
Qty: 90 CAPSULE | Refills: 1 | Status: CANCELLED | OUTPATIENT
Start: 2024-04-29 | End: 2025-04-24

## 2024-04-29 RX ORDER — ATORVASTATIN CALCIUM 10 MG/1
10 TABLET, FILM COATED ORAL NIGHTLY
Qty: 90 TABLET | Refills: 1 | Status: SHIPPED | OUTPATIENT
Start: 2024-04-29

## 2024-04-29 RX ORDER — AMLODIPINE BESYLATE 5 MG/1
5 TABLET ORAL DAILY
Qty: 90 TABLET | Refills: 1 | Status: SHIPPED | OUTPATIENT
Start: 2024-04-29 | End: 2025-04-24

## 2024-04-29 RX ORDER — AMLODIPINE BESYLATE 10 MG/1
10 TABLET ORAL DAILY
Qty: 90 TABLET | Refills: 1 | Status: CANCELLED | OUTPATIENT
Start: 2024-04-29

## 2024-04-29 RX ORDER — OMEPRAZOLE 20 MG/1
20 CAPSULE, DELAYED RELEASE ORAL
Qty: 90 CAPSULE | Refills: 1 | Status: SHIPPED | OUTPATIENT
Start: 2024-04-29

## 2024-04-29 SDOH — HEALTH STABILITY - PHYSICAL HEALTH: OCCUPATIONAL EXPOSURE TO UNSPECIFIED RISK FACTOR: Z57.9

## 2024-04-29 NOTE — PROGRESS NOTES
Bucky Mcneal is a 68 year old male.  DM, hypertension, hypercholesterolemia, heartburn, leg edema, pain in lower legs, lung nodule, abnormal heart scan, allergic to bees   HPI:   Patient is coming for follow-up of diabetes.  Hemoglobin A1c came back at 6.8.  Patient is taking metformin and Jardiance.  Has questions regarding Jardiance since the coverage is not the best and he pays $120 a month for the medication.  He is due for eye examination.  Denies any numbness or tingling of the feet.  Urine microalbumin came back elevated higher than previously -will monitor .    Hypertension patient is taking medications noted that occasionally his blood pressure gets a little bit lower that he is feeling a bit lightheaded.  Patient is climbing ladder he is a .  Will decrease amlodipine to 5 mg daily continue other medications.    Patient noticed leg edema bilateral lower legs trying to elevate the legs and swelling goes down.  Decrease amlodipine watch salt will have patient to monitor blood work.    Complains of having pain bilateral lower legs especially after climbing ladders and going up and down the stairs.  When resting he is doing better.  Also when doing fast walking noticed of the pain after stopping it gets better.  Will have patient to do ankle-brachial index for evaluation.  Will add CK to his blood work    Patient had lung nodule on a CT scan in November 2022, patient never smoked but he had work exposures to different pains and chemicals throughout his life.  Will repeat the CT scan for stability of the nodule order was placed.  Abnormal heart scan/hypercholesterolemia patient is taking  atorvastatin 10 mg daily.  Tolerates medication well.  Will monitor    GERD/heartburn it bothers him but stable on medication.  In [place] he was taking omeprazole 20 mg daily will have patient to decrease the dose to 20 mg.    Nasal congestion stuffiness using Flonase prescription was called in continue present  management.    Allergy to bees patient will get swelling closing of the throat sensation and shortness of breath after being stung by bee.  In [place] was giving epinephrine which he had to keep refrigerated.  Will start patient on EpiPen.      Current Outpatient Medications   Medication Sig Dispense Refill   • metoprolol succinate ER 25 MG Oral Tablet 24 Hr Take 1 tablet (25 mg total) by mouth daily. 90 tablet 1   • metFORMIN 500 MG Oral Tab Take 1 tablet (500 mg total) by mouth 2 (two) times daily with meals. 180 tablet 1   • atorvastatin 10 MG Oral Tab Take 1 tablet (10 mg total) by mouth nightly. 90 tablet 1   • empagliflozin (JARDIANCE) 10 MG Oral Tab Take 1 tablet (10 mg total) by mouth daily. 90 tablet 1   • amLODIPine 5 MG Oral Tab Take 1 tablet (5 mg total) by mouth daily. 90 tablet 1   • omeprazole 20 MG Oral Capsule Delayed Release Take 1 capsule (20 mg total) by mouth every morning before breakfast. 90 capsule 1   • EPINEPHrine (EPIPEN 2-CESARIO) 0.3 MG/0.3ML Injection Solution Auto-injector Inject 0.3 mL (1 each total) as directed as needed. 1 each 1   • OMEPRAZOLE 40 MG Oral Capsule Delayed Release TAKE 1 CAPSULE (40 MG TOTAL) BY MOUTH DAILY. 90 capsule 0   • FLUTICASONE PROPIONATE 50 MCG/ACT Nasal Suspension SPRAY 2 SPRAYS INTO EACH NOSTRIL EVERY DAY 48 mL 0   • aspirin 81 MG Oral Tab EC Take 1 tablet (81 mg total) by mouth daily.        Past Medical History:   • Diabetes (HCC)   • Essential hypertension   • Hyperlipidemia   • SVT (supraventricular tachycardia) ([facility])      Social History:  Social History     Socioeconomic History   • Marital status:    Tobacco Use   • Smoking status: Never   • Smokeless tobacco: Never   Vaping Use   • Vaping status: Never Used   Substance and Sexual Activity   • Alcohol use: Yes     Alcohol/week: 2.0 standard drinks of alcohol     Types: 1 Glasses of wine, 1 Cans of beer per week   • Drug use: Never   Other Topics Concern   • Caffeine Concern No   • Exercise No   •  Seat Belt Yes   • Special Diet No   • Stress Concern No   • Weight Concern No        REVIEW OF SYSTEMS:   GENERAL HEALTH: feels well otherwise  SKIN: denies any unusual skin lesions or rashes  HEENT no congestion no runny nose no sore throat  Neck no neck pain  RESPIRATORY: denies shortness of breath with exertion  CARDIOVASCULAR: denies chest pain on exertion  GI: denies abdominal pain and denies heartburn  NEURO: denies headaches  Psych normal mood.    EXAM:   /79 (BP Location: Left arm, Patient Position: Sitting, Cuff Size: adult)   Pulse 60   Temp 98.6 °F (37 °C) (Temporal)   Resp 14   Ht 5' 5.5\" (1.664 m)   Wt 158 lb (71.7 kg)   BMI 25.89 kg/m²   GENERAL: well developed, well nourished,in no apparent distress  SKIN: no rashes,no suspicious lesions  HEENT: atraumatic, normocephalic,ears and throat are clear  NECK: supple,no adenopathy,  LUNGS: clear to auscultation  CARDIO: RRR without murmur  GI: good BS's,no masses, HSM or tenderness  EXTREMITIES: no edema  Psychiatric - alert  and oriented x3, normal mood     Narrative   This is an over read for the non-cardiac, non-vascular limited visualized portions of the chest and abdomen.     PROCEDURE:  CT CALCIUM SCORING OVER READ     LOCATION:                                           COMPARISON:  None.     INDICATIONS:  Z13.6 Screening for cardiovascular condition     TECHNIQUE:  Unenhanced multislice CT scanning is performed through the chest as part of a cardiac CT evaluation.  Dose reduction techniques were used. Dose information is transmitted to the ACR (American College of Radiology) NRDR (National Radiology  Data Registry) which includes the Dose Index Registry.     PATIENT STATED HISTORY: (As transcribed by Technologist)           FINDINGS:    LUNGS:  A few scattered calcified granulomas are noted in the partially imaged lungs.  Pleural-based noncalcified pulmonary nodule in the right lower lobe measuring up to 4 mm on image 128 series 6.   Minimal scattered atelectasis/scarring in the  bilateral lungs.  DA:  No mass or adenopathy.    MEDIASTINUM:  No mass or adenopathy.    PLEURA:  No mass or effusion.    CHEST WALL:  No mass or axillary adenopathy.    LIMITED ABDOMEN:  Limited images of the upper abdomen are unremarkable.    BONES:  Degenerative changes in the spine.     This is an over read for the non-cardiac, non-vascular limited visualized portions of the chest and abdomen. This exam was not performed as a complete diagnostic CT of the chest. Please see the cardiologists' dictation which is reported separately.                   Impression   CONCLUSION:  4 mm noncalcified pulmonary nodule in the partially imaged right lower lobe. Followup as per Fleischner criteria is suggested.  Changes of old granulomatous disease.     The findings include a single, incidentally detected, solid pulmonary nodule, measuring less than 6mm.  2017 guidelines from the Fleischner Society for the follow-up and management of incidentally detected indeterminate pulmonary nodules in persons at  least 35 years of age depend on nodule size (average length and width) and underlying risk factors (including smoking and other risk factors). Please consider the following recommendations after clinical assessment of risk factors.  For <6mm solid  nodules: In low risk patients, no follow-up required.  If suspicious morphology or upper lobe location, consider 12 month follow-up.  In high risk patients, optional CT in 12 months.             Dictated by (CST): Vikas Mobley MD on 9/27/2022 at 9:52 AM      Finalized by (CST): Vikas Mobley MD on 9/27/2022 at 9:54 AM       Narrative                 Impression   PROCEDURE:  CT CALCIUM SCORING     LOCATION:                                           COMPARISON:  None.     INDICATIONS:  Z13.6 Screening for cardiovascular condition     TECHNIQUE:  The patient was placed in the supine position on the multidector CT table at ward  [facility].  EKG Gated was used to minimize cardiac motion. Non contrast 2mm axial cross sectional images were obtained in a narrow field of view to display  heart. Dose reduction techniques were used. Dose information is transmitted to the ACR (American College of Radiology) NRDR (National Radiology Data Registry) which includes the Dose Index Registry. See the Radiologist's over-read for evaluation of  non-cardiac and non-vascular structures.     FINDINGS:    CALCIUM SCORING RESULTS (Volume / [place]):  LEFT MAIN:    0.00 / 0.00  RCA:      7.86 / 5.24  LAD:      187.18 / 221.95  CIRCUMFLEX:  42.87 / 21.91  TOTAL:    237.91 / 249.10     Your Coronary Artery Calcium Score: 249.10     Clinical Relevance of Coronary Artery Scan Results (may change depending on age and sex of patient.)     Calcium Score  Implication  Risk of Coronary Artery Disease     0  No identifiable plaque  Very low, generally less than 5%     1-10  Minimal identifiable plaque  Very unlikely, less than 10%       Definite, at least mild atherosclerotic plaque  Mild risk, mild coronary atherosclerosis likely     101-400  Definite, at least moderate atherosclerotic plaque  Moderate risk, mild coronary artery disease highly likely,      significant atherosclerosis possible     401-1000  Definite, at least moderate atherosclerotic plaque  High risk, moderate coronary artery disease highly likely     >1000  Definite, moderate to severe atherosclerotic plaque  High risk, moderate to severe coronary artery disease      highly likely     This patient identified you as their physician, if this is not correct please contact the [facility] at [phone] and they will assign this report to another physician.        Dictated by (CST): Raffaele Menjivar MD on 9/30/2022 at 4:30 PM      Finalized by (CST): Raffaele Menjivar MD on 9/30/2022 at 4:32 PM       ASSESSMENT AND PLAN:     Encounter Diagnoses   Name Primary?   • Type 2 diabetes mellitus without  complication, without long-term current use of insulin (HCC) Yes   • Hypercholesteremia    • Hypertension, unspecified type    • Lung nodule    • Occupational exposure in workplace    • Pain in both lower extremities    • Heartburn    • Abnormal CT scan of heart    • Family history of coronary artery disease    • Bilateral leg edema    • Allergic reaction to bee sting    Diabetes type 2 now controlled continue both of the medications.  Patient said that he will pay for Jardiance since it is actually working very well.    Hypercholesterolemia on medication stable continue present management    Hypertension blood pressure stable continue current medication monitor blood pressure at home numbers occasionally feels that they are going down at home will decrease amlodipine to 5 mg daily.    Lung nodule with occupational exposure in workplace will repeat CT scan for stability of the small nodule.  Order was placed.    Pain in the lower extremities suspicious for claudication especially but it happens when patient is walking fast and have to stop.  Check acute brachial index.    Heartburn will decrease omeprazole to 20 mg daily patient said that he was on medication in the past in [place] and did okay monitor.    Abnormal CT heart scan on cholesterol-lowering medication aspirin monitor asymptomatic patient is found history of heart problems    Bilateral lower edema decrease amlodipine elevate legs monitor low salt diet    Allergic reaction to bees will give patient EpiPen carry Benadryl with him.  Review package insert for EpiPen for quick use in case if needed.    Orders Placed This Encounter   Procedures   • Comp Metabolic Panel (14)   • Lipid Panel   • Microalb/Creat Ratio, Random Urine   • Hemoglobin A1C   • CK (Creatine Kinase) (Not Creatinine) (E)       Meds & Refills for this Visit:  Requested Prescriptions     Signed Prescriptions Disp Refills   • metoprolol succinate ER 25 MG Oral Tablet 24 Hr 90 tablet 1     Sig:  Take 1 tablet (25 mg total) by mouth daily.   • metFORMIN 500 MG Oral Tab 180 tablet 1     Sig: Take 1 tablet (500 mg total) by mouth 2 (two) times daily with meals.   • atorvastatin 10 MG Oral Tab 90 tablet 1     Sig: Take 1 tablet (10 mg total) by mouth nightly.   • empagliflozin (JARDIANCE) 10 MG Oral Tab 90 tablet 1     Sig: Take 1 tablet (10 mg total) by mouth daily.   • amLODIPine 5 MG Oral Tab 90 tablet 1     Sig: Take 1 tablet (5 mg total) by mouth daily.   • omeprazole 20 MG Oral Capsule Delayed Release 90 capsule 1     Sig: Take 1 capsule (20 mg total) by mouth every morning before breakfast.   • EPINEPHrine (EPIPEN 2-CESARIO) 0.3 MG/0.3ML Injection Solution Auto-injector 1 each 1     Sig: Inject 0.3 mL (1 each total) as directed as needed.     Call [phone] to schedule CT chest.   Decrease amlodipine to 5 mg daily.  Decrease omeprazole to 20 mg 1 tablet half an hour before breakfast.  Review of directions how to use EpiPen -use  to train everybody in the family.  Do test from the stool for H. pylori.  Monitor leg swelling.  Elevate your legs as needed.  Low-salt diet.  Continue current meds.   Watch diet for fats and carbs.   Stay active.    Call [phone] to schedule ankle-brachial index.  Recheck blood work after July 29, 2024 prior next visit.    Imaging & Consults:  CT CHEST LD NO CAD(CPT=71250)  US ANKLE BRACHIAL INDEX (CPT=93922)    The patient indicates understanding of these issues and agrees to the plan.  The patient is asked to return in August 2024.  Total face to face time was 40 min spend on visit and documentation.  The note was dictated using speech recognition software.  Accuracy and grammar in transcription may be subject to error.

## 2024-04-29 NOTE — PATIENT INSTRUCTIONS
Call [phone] to schedule CT chest.   Decrease amlodipine to 5 mg daily.  Decrease omeprazole to 20 mg 1 tablet half an hour before breakfast.  Review of directions how to use EpiPen -use  to train everybody in the family.  Do test from the stool for H. pylori.  Monitor leg swelling.  Elevate your legs as needed.  Low-salt diet.  Continue current meds.   Watch diet for fats and carbs.   Stay active.    Call [phone] to schedule ankle-brachial index.  Recheck blood work after July 29, 2024 prior next visit.

## 2024-05-09 ENCOUNTER — LAB ENCOUNTER (OUTPATIENT)
Dept: LAB | Age: 69
End: 2024-05-09
Attending: FAMILY MEDICINE
Payer: COMMERCIAL

## 2024-05-09 DIAGNOSIS — R12 HEARTBURN: ICD-10-CM

## 2024-05-09 PROCEDURE — 87338 HPYLORI STOOL AG IA: CPT

## 2024-05-14 ENCOUNTER — HOSPITAL ENCOUNTER (OUTPATIENT)
Dept: CT IMAGING | Facility: HOSPITAL | Age: 69
Discharge: HOME | End: 2024-05-14
Attending: FAMILY MEDICINE

## 2024-05-14 ENCOUNTER — HOSPITAL ENCOUNTER (OUTPATIENT)
Dept: ULTRASOUND IMAGING | Facility: HOSPITAL | Age: 69
Discharge: HOME | End: 2024-05-14
Attending: FAMILY MEDICINE

## 2024-05-14 DIAGNOSIS — Z57.9 OCCUPATIONAL EXPOSURE IN WORKPLACE: ICD-10-CM

## 2024-05-14 DIAGNOSIS — E11.9 TYPE 2 DIABETES MELLITUS WITHOUT COMPLICATION, WITHOUT LONG-TERM CURRENT USE OF INSULIN (HCC): ICD-10-CM

## 2024-05-14 DIAGNOSIS — I10 HYPERTENSION, UNSPECIFIED TYPE: ICD-10-CM

## 2024-05-14 DIAGNOSIS — M79.604 PAIN IN BOTH LOWER EXTREMITIES: ICD-10-CM

## 2024-05-14 DIAGNOSIS — E78.00 HYPERCHOLESTEREMIA: ICD-10-CM

## 2024-05-14 DIAGNOSIS — R91.1 LUNG NODULE: ICD-10-CM

## 2024-05-14 DIAGNOSIS — M79.605 PAIN IN BOTH LOWER EXTREMITIES: ICD-10-CM

## 2024-05-14 LAB — H PYLORI AG STL QL IA: NEGATIVE

## 2024-05-14 PROCEDURE — 71250 CT THORAX DX C-: CPT | Performed by: FAMILY MEDICINE

## 2024-05-14 PROCEDURE — 93922 UPR/L XTREMITY ART 2 LEVELS: CPT | Performed by: FAMILY MEDICINE

## 2024-05-14 SDOH — HEALTH STABILITY - PHYSICAL HEALTH: OCCUPATIONAL EXPOSURE TO UNSPECIFIED RISK FACTOR: Z57.9

## 2024-05-28 ENCOUNTER — TELEPHONE (OUTPATIENT)
Dept: FAMILY MEDICINE CLINIC | Facility: CLINIC | Age: 69
End: 2024-05-28

## 2024-05-28 DIAGNOSIS — E11.9 TYPE 2 DIABETES MELLITUS WITHOUT COMPLICATION, WITHOUT LONG-TERM CURRENT USE OF INSULIN (HCC): Primary | ICD-10-CM

## 2024-05-28 RX ORDER — LANCETS
EACH MISCELLANEOUS
Qty: 100 EACH | Refills: 3 | Status: SHIPPED | OUTPATIENT
Start: 2024-05-28

## 2024-05-28 RX ORDER — BLOOD-GLUCOSE METER
1 EACH MISCELLANEOUS DAILY
Qty: 1 KIT | Refills: 0 | Status: SHIPPED | OUTPATIENT
Start: 2024-05-28

## 2024-05-28 NOTE — TELEPHONE ENCOUNTER
Pt received a letter in the mail stating he could get a free blood glucose monitor. Contour next gen.  He would like RX sent o pharmacy for monitor lancets and test strips. [id number] - [place], [place] - [address] [phone], [phone] [[id number]] Please advise. Thank you.

## 2024-07-13 DIAGNOSIS — R09.81 NASAL CONGESTION: Primary | ICD-10-CM

## 2024-07-15 RX ORDER — FLUTICASONE PROPIONATE 50 MCG
2 SPRAY, SUSPENSION (ML) NASAL DAILY
Qty: 48 ML | Refills: 0 | Status: SHIPPED | OUTPATIENT
Start: 2024-07-15

## 2024-07-15 NOTE — TELEPHONE ENCOUNTER
LOV:  4/29/2024 for: Medication Follow-Up   Patient advised to RTC on:  August 2024.   Nov:09/11/2024    Medication Quantity Refills Start End   FLUTICASONE PROPIONATE 50 MCG/ACT Nasal Suspension 48 mL 0 3/20/2024 --   Sig:   SPRAY 2 SPRAYS INTO EACH NOSTRIL EVERY DAY

## 2024-09-05 ENCOUNTER — LAB ENCOUNTER (OUTPATIENT)
Dept: LAB | Age: 69
End: 2024-09-05
Attending: FAMILY MEDICINE
Payer: MEDICAID

## 2024-09-05 DIAGNOSIS — E11.9 TYPE 2 DIABETES MELLITUS WITHOUT COMPLICATION, WITHOUT LONG-TERM CURRENT USE OF INSULIN (HCC): ICD-10-CM

## 2024-09-05 DIAGNOSIS — E78.00 HYPERCHOLESTEREMIA: ICD-10-CM

## 2024-09-05 LAB
ALBUMIN SERPL-MCNC: 4.2 G/DL (ref 3.2–4.8)
ALBUMIN/GLOB SERPL: 1.8 {RATIO} (ref 1–2)
ALP LIVER SERPL-CCNC: 67 U/L
ALT SERPL-CCNC: 18 U/L
ANION GAP SERPL CALC-SCNC: 6 MMOL/L (ref 0–18)
AST SERPL-CCNC: 19 U/L (ref ?–34)
BILIRUB SERPL-MCNC: 1.5 MG/DL (ref 0.2–1.1)
BUN BLD-MCNC: 10 MG/DL (ref 9–23)
CALCIUM BLD-MCNC: 9.6 MG/DL (ref 8.7–10.4)
CHLORIDE SERPL-SCNC: 105 MMOL/L (ref 98–112)
CHOLEST SERPL-MCNC: 186 MG/DL (ref ?–200)
CO2 SERPL-SCNC: 27 MMOL/L (ref 21–32)
CREAT BLD-MCNC: 0.86 MG/DL
CREAT UR-SCNC: 180.6 MG/DL
EGFRCR SERPLBLD CKD-EPI 2021: 93 ML/MIN/1.73M2 (ref 60–?)
EST. AVERAGE GLUCOSE BLD GHB EST-MCNC: 174 MG/DL (ref 68–126)
FASTING PATIENT LIPID ANSWER: YES
FASTING STATUS PATIENT QL REPORTED: YES
GLOBULIN PLAS-MCNC: 2.4 G/DL (ref 2–3.5)
GLUCOSE BLD-MCNC: 179 MG/DL (ref 70–99)
HBA1C MFR BLD: 7.7 % (ref ?–5.7)
HDLC SERPL-MCNC: 38 MG/DL (ref 40–59)
LDLC SERPL CALC-MCNC: 110 MG/DL (ref ?–100)
MICROALBUMIN UR-MCNC: 12.4 MG/DL
MICROALBUMIN/CREAT 24H UR-RTO: 68.7 UG/MG (ref ?–30)
NONHDLC SERPL-MCNC: 148 MG/DL (ref ?–130)
OSMOLALITY SERPL CALC.SUM OF ELEC: 290 MOSM/KG (ref 275–295)
POTASSIUM SERPL-SCNC: 4.2 MMOL/L (ref 3.5–5.1)
PROT SERPL-MCNC: 6.6 G/DL (ref 5.7–8.2)
SODIUM SERPL-SCNC: 138 MMOL/L (ref 136–145)
TRIGL SERPL-MCNC: 217 MG/DL (ref 30–149)
VLDLC SERPL CALC-MCNC: 37 MG/DL (ref 0–30)

## 2024-09-05 PROCEDURE — 82570 ASSAY OF URINE CREATININE: CPT

## 2024-09-05 PROCEDURE — 80061 LIPID PANEL: CPT

## 2024-09-05 PROCEDURE — 82043 UR ALBUMIN QUANTITATIVE: CPT

## 2024-09-05 PROCEDURE — 80053 COMPREHEN METABOLIC PANEL: CPT

## 2024-09-05 PROCEDURE — 83036 HEMOGLOBIN GLYCOSYLATED A1C: CPT

## 2024-09-11 ENCOUNTER — OFFICE VISIT (OUTPATIENT)
Dept: FAMILY MEDICINE CLINIC | Facility: CLINIC | Age: 69
End: 2024-09-11
Payer: MEDICAID

## 2024-09-11 VITALS
RESPIRATION RATE: 14 BRPM | WEIGHT: 161 LBS | SYSTOLIC BLOOD PRESSURE: 122 MMHG | BODY MASS INDEX: 26.5 KG/M2 | HEIGHT: 65.5 IN | DIASTOLIC BLOOD PRESSURE: 70 MMHG | TEMPERATURE: 97 F | HEART RATE: 84 BPM

## 2024-09-11 DIAGNOSIS — E78.00 HYPERCHOLESTEREMIA: Primary | ICD-10-CM

## 2024-09-11 DIAGNOSIS — R91.1 LUNG NODULE: ICD-10-CM

## 2024-09-11 DIAGNOSIS — I10 HYPERTENSION, UNSPECIFIED TYPE: ICD-10-CM

## 2024-09-11 DIAGNOSIS — R93.1 ABNORMAL CT SCAN OF HEART: ICD-10-CM

## 2024-09-11 DIAGNOSIS — K21.9 GASTROESOPHAGEAL REFLUX DISEASE WITHOUT ESOPHAGITIS: ICD-10-CM

## 2024-09-11 DIAGNOSIS — E11.65 UNCONTROLLED TYPE 2 DIABETES MELLITUS WITH HYPERGLYCEMIA (HCC): ICD-10-CM

## 2024-09-11 DIAGNOSIS — Z82.49 FAMILY HISTORY OF CORONARY ARTERY DISEASE: ICD-10-CM

## 2024-09-11 PROCEDURE — 99215 OFFICE O/P EST HI 40 MIN: CPT | Performed by: FAMILY MEDICINE

## 2024-09-11 RX ORDER — ATORVASTATIN CALCIUM 10 MG/1
10 TABLET, FILM COATED ORAL NIGHTLY
Qty: 90 TABLET | Refills: 1 | Status: CANCELLED | OUTPATIENT
Start: 2024-09-11

## 2024-09-11 RX ORDER — ATORVASTATIN CALCIUM 20 MG/1
20 TABLET, FILM COATED ORAL NIGHTLY
Qty: 90 TABLET | Refills: 1 | Status: SHIPPED | OUTPATIENT
Start: 2024-09-11

## 2024-09-11 RX ORDER — AMLODIPINE BESYLATE 10 MG/1
10 TABLET ORAL DAILY
COMMUNITY
Start: 2024-07-14 | End: 2024-09-11

## 2024-09-11 RX ORDER — EMPAGLIFLOZIN 25 MG/1
1 TABLET, FILM COATED ORAL DAILY
Qty: 90 TABLET | Refills: 1 | Status: SHIPPED | OUTPATIENT
Start: 2024-09-11

## 2024-09-11 NOTE — PROGRESS NOTES
Bucky Mcneal is a 68 year old male.  DM, hypertension, hypercholesterolemia, heartburn, leg edema, pain in lower legs, lung nodule, abnormal heart scan, allergic to bees   HPI:   Patient is coming for follow-up of diabetes.  Hemoglobin A1c came back at 7.7 which is elevated. .  Patient is taking metformin and Jardiance.  Will adjust medication doses.  Patient eye examination done this year.    Denies any numbness or tingling of the feet.  Urine microalbumin came back elevated higher than previously -will monitor .    Hypertension patient is taking medications daily doing well.  Continue current management.    Patient had lung nodule on a CT scan in November 2022, patient never smoked but he had work exposures to different pains and chemicals throughout his life.  Recent CT scan came back stable does not need any additional testing.    Abnormal heart scan/hypercholesterolemia patient is taking  atorvastatin 10 mg daily.  Cholesterol numbers came back elevated will have patient to increase atorvastatin to 20 mg daily.    GERD/heartburn it bothers him but stable on medication.  In [place] he was taking omeprazole 20 mg daily will have patient to decrease the dose to 20 mg.    Allergy to bees using as needed EpiPen.      Current Outpatient Medications   Medication Sig Dispense Refill   • omeprazole 20 MG Oral Capsule Delayed Release Take 1 capsule (20 mg total) by mouth every morning before breakfast. 90 capsule 1   • Empagliflozin (JARDIANCE) 25 MG Oral Tab Take 1 tablet by mouth daily. 90 tablet 1   • metFORMIN HCl 1000 MG Oral Tab Take 1 tablet (1,000 mg total) by mouth 2 (two) times daily with meals. 180 tablet 1   • atorvastatin 20 MG Oral Tab Take 1 tablet (20 mg total) by mouth nightly. 90 tablet 1   • FLUTICASONE PROPIONATE 50 MCG/ACT Nasal Suspension SPRAY 2 SPRAYS INTO EACH NOSTRIL EVERY DAY 48 mL 0   • Blood Glucose Monitoring Suppl (CONTOUR NEXT MONITOR) w/Device Does not apply Kit 1 each daily. Test once  daily Primary Diagnosis Type 2 diabetes mellitus without complication, without long-term current use of insulin ([facility]) E11.9 1 kit 0   • Glucose Blood (CONTOUR NEXT TEST) In Vitro Strip Test once daily Primary Diagnosis Type 2 diabetes mellitus without complication, without long-term current use of insulin ([facility]) E11.9 100 each 3   • Microlet Lancets Does not apply Misc Test once daily Primary Diagnosis Type 2 diabetes mellitus without complication, without long-term current use of insulin ([facility]) E11.9 100 each 3   • metoprolol succinate ER 25 MG Oral Tablet 24 Hr Take 1 tablet (25 mg total) by mouth daily. 90 tablet 1   • metFORMIN 500 MG Oral Tab Take 1 tablet (500 mg total) by mouth 2 (two) times daily with meals. 180 tablet 1   • atorvastatin 10 MG Oral Tab Take 1 tablet (10 mg total) by mouth nightly. 90 tablet 1   • empagliflozin (JARDIANCE) 10 MG Oral Tab Take 1 tablet (10 mg total) by mouth daily. 90 tablet 1   • amLODIPine 5 MG Oral Tab Take 1 tablet (5 mg total) by mouth daily. 90 tablet 1   • EPINEPHrine (EPIPEN 2-CESARIO) 0.3 MG/0.3ML Injection Solution Auto-injector Inject 0.3 mL (1 each total) as directed as needed. 1 each 1   • aspirin 81 MG Oral Tab EC Take 1 tablet (81 mg total) by mouth daily.        Past Medical History:   • Diabetes ([facility])   • Essential hypertension   • Hyperlipidemia   • SVT (supraventricular tachycardia) ([facility])      Social History:  Social History     Socioeconomic History   • Marital status:    Tobacco Use   • Smoking status: Never   • Smokeless tobacco: Never   Vaping Use   • Vaping status: Never Used   Substance and Sexual Activity   • Alcohol use: Yes     Alcohol/week: 2.0 standard drinks of alcohol     Types: 1 Glasses of wine, 1 Cans of beer per week   • Drug use: Never   Other Topics Concern   • Caffeine Concern No   • Exercise No   • Seat Belt Yes   • Special Diet No   • Stress Concern No   • Weight Concern No        REVIEW OF SYSTEMS:   GENERAL HEALTH: feels well  otherwise  SKIN: denies any unusual skin lesions or rashes  HEENT no congestion no runny nose no sore throat  Neck no neck pain  RESPIRATORY: denies shortness of breath with exertion  CARDIOVASCULAR: denies chest pain on exertion  GI: denies abdominal pain and denies heartburn  NEURO: denies headaches  Psych normal mood.    EXAM:   /70 (BP Location: Right arm, Patient Position: Sitting, Cuff Size: adult)   Pulse 84   Temp 96.8 °F (36 °C) (Temporal)   Resp 14   Ht 5' 5.5\" (1.664 m)   Wt 161 lb (73 kg)   BMI 26.38 kg/m²   GENERAL: well developed, well nourished,in no apparent distress  SKIN: no rashes,no suspicious lesions  HEENT: atraumatic, normocephalic,ears and throat are clear  NECK: supple,no adenopathy,  LUNGS: clear to auscultation  CARDIO: RRR without murmur  GI: good BS's,no masses, HSM or tenderness  EXTREMITIES: no edema  Psychiatric - alert  and oriented x3, normal mood   Results for orders placed or performed in visit on 09/05/24   Microalb/Creat Ratio, Random Urine   Result Value Ref Range    Microalbumin, Urine 12.40 mg/dL    Creatinine Ur Random 180.60 mg/dL    Malb/Cre Calc 68.7 (H) <=30.0 ug/mg   Comp Metabolic Panel (14)   Result Value Ref Range    Glucose 179 (H) 70 - 99 mg/dL    Sodium 138 136 - 145 mmol/L    Potassium 4.2 3.5 - 5.1 mmol/L    Chloride 105 98 - 112 mmol/L    CO2 27.0 21.0 - 32.0 mmol/L    Anion Gap 6 0 - 18 mmol/L    BUN 10 9 - 23 mg/dL    Creatinine 0.86 0.70 - 1.30 mg/dL    Calcium, Total 9.6 8.7 - 10.4 mg/dL    Calculated Osmolality 290 275 - 295 mOsm/kg    eGFR-Cr 93 >=60 mL/min/1.73m2    AST 19 <34 U/L    ALT 18 10 - 49 U/L    Alkaline Phosphatase 67 45 - 117 U/L    Bilirubin, Total 1.5 (H) 0.2 - 1.1 mg/dL    Total Protein 6.6 5.7 - 8.2 g/dL    Albumin 4.2 3.2 - 4.8 g/dL    Globulin  2.4 2.0 - 3.5 g/dL    A/G Ratio 1.8 1.0 - 2.0    Patient Fasting for CMP? Yes    Lipid Panel   Result Value Ref Range    Cholesterol, Total 186 <200 mg/dL    HDL Cholesterol 38 (L) 40  - 59 mg/dL    Triglycerides 217 (H) 30 - 149 mg/dL    LDL Cholesterol 110 (H) <100 mg/dL    VLDL 37 (H) 0 - 30 mg/dL    Non HDL Chol 148 (H) <130 mg/dL    Patient Fasting for Lipid? Yes    Hemoglobin A1C   Result Value Ref Range    HgbA1C 7.7 (H) <5.7 %    Estimated Average Glucose 174 (H) 68 - 126 mg/dL       ASSESSMENT AND PLAN:     Encounter Diagnoses   Name Primary?   • Hypercholesteremia Yes   • Uncontrolled type 2 diabetes mellitus with hyperglycemia (HCC)    • Abnormal CT scan of heart    • Gastroesophageal reflux disease without esophagitis    • Hypertension, unspecified type    • Lung nodule    • Family history of coronary artery disease          Diabetes type 2  not controlled increase Jardiance and metformin dose monitor.  Recheck blood work prior next visit.      Hypercholesterolemia on medication cholesterol numbers elevated increase atorvastatin 20 mg daily.    Hypertension blood pressure stable continue current medication  refill was called and blood pressure stable.    Lung nodule with occupational exposure in workplace  CT scan was rechecked came back stable no further testing is needed.     Heartburn will decrease omeprazole to 20 mg daily patient said that he was on medication in the past in [place] and did okay monitor.    Abnormal CT heart scan on cholesterol-lowering medication aspirin monitor asymptomatic,  patient family history of heart problems increase atorvastatin to 20 mg daily.        Orders Placed This Encounter   Procedures   • Hemoglobin A1C   • Lipid Panel   • Microalb/Creat Ratio, Random Urine   • Comp Metabolic Panel (14)       Meds & Refills for this Visit:  Requested Prescriptions     Signed Prescriptions Disp Refills   • omeprazole 20 MG Oral Capsule Delayed Release 90 capsule 1     Sig: Take 1 capsule (20 mg total) by mouth every morning before breakfast.   • Empagliflozin (JARDIANCE) 25 MG Oral Tab 90 tablet 1     Sig: Take 1 tablet by mouth daily.   • metFORMIN HCl 1000 MG  Oral Tab 180 tablet 1     Sig: Take 1 tablet (1,000 mg total) by mouth 2 (two) times daily with meals.   • atorvastatin 20 MG Oral Tab 90 tablet 1     Sig: Take 1 tablet (20 mg total) by mouth nightly.     Increase metformin to 1000 mg 1 tablet twice a day.  Increase Jardiance to 25 mg daily.  Increase atorvastatin to 20 mg daily.  Watch diet for carbs and fats.  Stay active.  Do fasting blood work 1 week prior next visit.    Imaging & Consults:  None    The patient indicates understanding of these issues and agrees to the plan.  The patient is asked to return in 2025.  Total face to face time was 40 min spend on visit and documentation.  The note was dictated using speech recognition software.  Accuracy and grammar in transcription may be subject to error.

## 2024-09-11 NOTE — PATIENT INSTRUCTIONS
Increase metformin to 1000 mg 1 tablet twice a day.  Increase Jardiance to 25 mg daily.  Increase atorvastatin to 20 mg daily.  Watch diet for carbs and fats.  Stay active.  Do fasting blood work 1 week prior next visit.      Refill policies:      Allow 3 business days for refills; controlled substances may take longer.  Contact your pharmacy at least 5-7 business days prior to running out of medication and have them send an electronic request or submit through the \"request refill\" option thru your [employer] account. No need to do both, as multiple requests will create an automated [employer] message to notify of a denial for one of the duplicated requests, causing you undue confusion.   Refills are NOT addressed on weekends; covering physicians do not authorize routine medications on weekends.  No narcotics or controlled substances are refilled after noon on Fridays or by on call physicians.  By law, narcotics cannot be faxed or phoned into your pharmacy.  If your prescription is due for a refill, you may be due for a follow up appointment. Please call our office at [phone] to make an appointment or schedule an appointment via [employer].  To best provide you care, patients receiving routine medications need to be seen at least twice a year. Patients receiving narcotic/controlled substance medications need to be seen at least once every 3 months.  In the event that your preferred pharmacy does not have the requested medication in stock (ie Backordered), it is your responsibility to find another pharmacy that has the requested medication available. We will gladly send a new prescription to that pharmacy at your request.  controlled substances may not be able to be filled out of state due to license restrictions.  If you have a planned trip, it's best to call your pharmacy at least 5-7 business days to prevent any delays in your medication refill.    Scheduling Tests:    If your physician has ordered radiology tests  such as MRI or CT scans, please contact Central Scheduling at [phone] right away to schedule the test.  Once scheduled, the [facility] Centralized Referral Team will work with your insurance carrier to obtain pre-certification or prior authorization.  Depending on your insurance carrier, approval may take 3-10 days.  It is highly recommended patients assure they have received an authorization before having a test performed.  If test is done without insurance authorization, patient may be responsible for the entire amount billed.      Precertification and Prior Authorizations:  If your physician has recommended that you have a procedure or additional testing performed the [facility] Centralized Referral Team will contact your insurance carrier to obtain pre-certification or prior authorization.    You are strongly encouraged to contact your insurance carrier to verify that your procedure/test has been approved and is a COVERED benefit.  Although the [facility] Centralized Referral Team does its due diligence, the insurance carrier gives the disclaimer that \"Although the procedure is authorized, this does not guarantee payment.\"    Ultimately the patient is responsible for payment.   Thank you for your understanding in this matter.  Paperwork Completion:  If you require FMLA or disability paperwork for your recovery, please make sure to either drop it off or have it faxed to our office at [phone]. Be sure the form has your name and date of birth on it.  The form will be faxed to our Forms Department and they will complete it for you.  There is a 25$ fee for all forms that need to be filled out.  Please be aware there is a 10-14 day turnaround time.  You will need to sign a release of information (SAI) form if your paperwork does not come with one.  You may call the Forms Department with any questions at [phone].  Their fax number is [phone].

## 2025-01-10 RX ORDER — METOPROLOL SUCCINATE 25 MG/1
25 TABLET, EXTENDED RELEASE ORAL DAILY
Qty: 90 TABLET | Refills: 0 | Status: SHIPPED | OUTPATIENT
Start: 2025-01-10

## 2025-01-10 NOTE — TELEPHONE ENCOUNTER
Last Office Visit: 09/11/2024    Last Refill:   Medication Quantity Refills Start End   metoprolol succinate ER 25 MG Oral Tablet 24 Hr 90 tablet 1 4/29/2024 --     Return to Clinic: 03/11/2025    Protocol: passed

## (undated) DEVICE — STRIP,CLOSURE,WOUND,MEDI-STRIP,1/2X4: Brand: MEDLINE

## (undated) DEVICE — ULTRATAPE 2MM BLUE 38 PKG OF 6

## (undated) DEVICE — DRAPE,HIP,W/POUCHES,STERILE: Brand: MEDLINE

## (undated) DEVICE — MCLASS OSCILLATING SAW BLADE 19 X 1.27 (0.050") X 90 MM: Brand: MCLASS

## (undated) DEVICE — FAN SPRAY KIT: Brand: PULSAVAC®

## (undated) DEVICE — SURGICAL PROCEDURE PACK TOT HIP CDS

## (undated) DEVICE — AMD ANTIMICROBIAL NON-ADHERENT PAD,0.2% POLYHEXAMETHYLENE BIGUANIDE HCI (PHMB): Brand: TELFA

## (undated) DEVICE — CONTAINER,SPECIMEN,O.R.STRL,4.5OZ: Brand: MEDLINE

## (undated) DEVICE — SUTURE MCRYL SZ 3-0 L18IN ABSRB UD L19MM PS-2 3/8 CIR PRIM Y497G

## (undated) DEVICE — NDL SPNE QNCKE 18GX3.5IN LF --

## (undated) DEVICE — STRYKER PERFORMANCE SERIES SAGITTAL BLADE: Brand: STRYKER PERFORMANCE SERIES

## (undated) DEVICE — TRAY CATH 16F DRN BG LTX -- CONVERT TO ITEM 363158

## (undated) DEVICE — Z DISCONTINUED USE 2423037 APPLICATOR MEDICATED 3 CC CHLORHEXIDINE GLUC 2% CHLORAPREP

## (undated) DEVICE — MEDI-VAC YANKAUER SUCTION HANDLE W/BULBOUS TIP: Brand: CARDINAL HEALTH

## (undated) DEVICE — SOLUTION IRRIG 3000ML 0.9% SOD CHL FLX CONT 0797208] ICU MEDICAL INC]

## (undated) DEVICE — ABDOMINAL PAD: Brand: DERMACEA

## (undated) DEVICE — TELFA NON-ADHERENT ABSORBENT DRESSING: Brand: TELFA

## (undated) DEVICE — SOFT SILICONE HYDROCELLULAR FOAM DRESSING WITH LOCK AWAY LAYER: Brand: ALLEVYN LIFE XL 21X21 CTN10

## (undated) DEVICE — JELLY LUBRICATING 10GM PREFIL SYR LUBE

## (undated) DEVICE — 2000CC GUARDIAN II: Brand: GUARDIAN

## (undated) DEVICE — 3M™ STERI-DRAPE™ INCISE DRAPE, XL 1051: Brand: STERI-DRAPE™

## (undated) DEVICE — ULTRATAPE SUTURE COBRAID BLUE, 6                                    PER BOX: Brand: ULTRATAPE

## (undated) DEVICE — T4 HOOD

## (undated) DEVICE — GOWN,AURORA,FABRIC-REINFORCED,2XL: Brand: MEDLINE

## (undated) DEVICE — BUTTON SWITCH PENCIL BLADE ELECTRODE, HOLSTER: Brand: EDGE

## (undated) DEVICE — HEWSON SUTURE RETRIEVER: Brand: HEWSON SUTURE RETRIEVER

## (undated) DEVICE — SUTURE MCRYL SZ 2-0 L27IN ABSRB UD CP-1 1 L36MM 1/2 CIR REV Y266H

## (undated) DEVICE — 1840 FOAM BLOCK NEEDLE COUNTER: Brand: DEVON

## (undated) DEVICE — SYR 10ML LUER LOK 1/5ML GRAD --

## (undated) DEVICE — (D)PREP SKN CHLRAPRP APPL 26ML -- CONVERT TO ITEM 371833

## (undated) DEVICE — INTENDED FOR TISSUE SEPARATION, AND OTHER PROCEDURES THAT REQUIRE A SHARP SURGICAL BLADE TO PUNCTURE OR CUT.: Brand: BARD-PARKER ® STAINLESS STEEL BLADES

## (undated) DEVICE — INFLOW CASSETTE TUBING, DO NOT USE IF PACKAGE IS DAMAGED: Brand: CROSSFLOW

## (undated) DEVICE — SUTURE FIBERWIRE SZ 2 W/ TAPERED NEEDLE BLUE L38IN NONABSORB BLU L26.5MM 1/2 CIRCLE AR7200

## (undated) DEVICE — SYSTEM SKIN CLSR 22CM DERMBND PRINEO

## (undated) DEVICE — 90-S ACCELERATOR, SUCTION PROBE, NON-BENDABLE, MAX CUT LEVEL 11: Brand: SERFAS ENERGY

## (undated) DEVICE — SOLUTION IV DEXTROSE/SALINE 5%-0.9% 500ML - 500ML

## (undated) DEVICE — 3M™ STERI-DRAPE™ INSTRUMENT POUCH 1018: Brand: STERI-DRAPE™

## (undated) DEVICE — BIPOLAR SEALER 23-112-1 AQM 6.0: Brand: AQUAMANTYS ®

## (undated) DEVICE — NEEDLE HYPO 21GA L1.5IN INTRAMUSCULAR S STL LATCH BVL UP

## (undated) DEVICE — ADHESIVE SKIN CLOSURE 4X22 CM PREMIERPRO EXOFINFUSION DISP

## (undated) DEVICE — REM POLYHESIVE ADULT PATIENT RETURN ELECTRODE: Brand: VALLEYLAB

## (undated) DEVICE — 1010 S-DRAPE TOWEL DRAPE 10/BX: Brand: STERI-DRAPE™

## (undated) DEVICE — AMD ANTIMICROBIAL GAUZE SPONGES,12 PLY USP TYPE VII, 0.2% POLYHEXAMETHYLENE BIGUANIDE HCI (PHMB): Brand: CURITY

## (undated) DEVICE — TRAY PREP DRY W/ PREM GLV 2 APPL 6 SPNG 2 UNDPD 1 OVERWRAP

## (undated) DEVICE — 4-PORT MANIFOLD: Brand: NEPTUNE 2

## (undated) DEVICE — DRAPE,TOP,102X53,STERILE: Brand: MEDLINE

## (undated) DEVICE — DRAPE SHT 3 QTR PROXIMA 53X77 --

## (undated) DEVICE — SUTURE MCRYL SZ 3-0 L27IN ABSRB UD L19MM PS-2 3/8 CIR PRIM Y427H

## (undated) DEVICE — CLEAR-TRAC 7.0 MM X 72 MM THREADED                                    CANNULA, WITH DISPOSABLE OBTURATOR,                                    GREY, STERILE: Brand: CLEAR-TRAC

## (undated) DEVICE — IMMOBILIZER SHLDR M UNIV 65X17IN BLK LIGHWEIGHT PCH SZ REUSE

## (undated) DEVICE — SUTURE STRATAFIX SZ 3-0 L30CM NONABSORBABLE UD L19MM FS-2 SXMP2B408

## (undated) DEVICE — CANNULA THREADED FLEX 6.5 X 72MM: Brand: CLEAR-TRAC

## (undated) DEVICE — STOCKINETTE TUBE 6X48 -- MEDICHOICE

## (undated) DEVICE — GARMENT,MEDLINE,DVT,INT,CALF,MED, GEN2: Brand: MEDLINE

## (undated) DEVICE — [AGGRESSIVE 6-FLUTE BARREL BUR, ARTHROSCOPIC SHAVER BLADE,  DO NOT RESTERILIZE,  DO NOT USE IF PACKAGE IS DAMAGED,  KEEP DRY,  KEEP AWAY FROM SUNLIGHT]: Brand: FORMULA

## (undated) DEVICE — MASTISOL ADHESIVE LIQ 2/3ML

## (undated) DEVICE — SOLUTION IV 1000ML 0.9% SOD CHL

## (undated) DEVICE — OUTFLOW CASSETTE TUBING, DO NOT USE IF PACKAGE IS DAMAGED: Brand: CROSSFLOW

## (undated) DEVICE — BLADE SHV CUT MENIS AGG + 4MM --

## (undated) DEVICE — SHOULDER ARTHRO DR BAUMGARTEN: Brand: MEDLINE INDUSTRIES, INC.

## (undated) DEVICE — 3M™ IOBAN™ 2 ANTIMICROBIAL INCISE DRAPE 6650EZ: Brand: IOBAN™ 2

## (undated) DEVICE — FIRSTPASS ST SUTURE PASSER, STANDARD: Brand: FIRSTPASS

## (undated) DEVICE — NEEDLE SPNL 22GA L3.5IN BLK HUB S STL REG WALL FIT STYL W/

## (undated) DEVICE — DRAPE,U/SHT,SPLIT,FILM,60X84,STERILE: Brand: MEDLINE

## (undated) DEVICE — YANKAUER,BULB TIP,W/O VENT,RIGID,STERILE: Brand: MEDLINE

## (undated) DEVICE — SHEET, ORTHO, SPLIT, STERILE: Brand: MEDLINE

## (undated) DEVICE — SUTURE STRATAFIX SPRL SZ 1 L5IN ABSRB VLT CT-1 L36MM 1/2 SXPD2B401

## (undated) DEVICE — BLADE ELECTRODE: Brand: VALLEYLAB

## (undated) DEVICE — 3000CC GUARDIAN II: Brand: GUARDIAN

## (undated) DEVICE — SYR 50ML LR LCK 1ML GRAD NSAF --

## (undated) DEVICE — PACK,SHOULDER,DRAPE,POUCH: Brand: MEDLINE